# Patient Record
Sex: FEMALE | Race: WHITE | NOT HISPANIC OR LATINO | Employment: FULL TIME
[De-identification: names, ages, dates, MRNs, and addresses within clinical notes are randomized per-mention and may not be internally consistent; named-entity substitution may affect disease eponyms.]

---

## 2017-10-11 ENCOUNTER — RECORDS - HEALTHEAST (OUTPATIENT)
Dept: ADMINISTRATIVE | Facility: OTHER | Age: 47
End: 2017-10-11

## 2018-01-11 ENCOUNTER — RECORDS - HEALTHEAST (OUTPATIENT)
Dept: ADMINISTRATIVE | Facility: OTHER | Age: 48
End: 2018-01-11

## 2018-01-12 ENCOUNTER — RECORDS - HEALTHEAST (OUTPATIENT)
Dept: ADMINISTRATIVE | Facility: OTHER | Age: 48
End: 2018-01-12

## 2018-01-31 ENCOUNTER — RECORDS - HEALTHEAST (OUTPATIENT)
Dept: ADMINISTRATIVE | Facility: OTHER | Age: 48
End: 2018-01-31

## 2018-02-12 ENCOUNTER — RECORDS - HEALTHEAST (OUTPATIENT)
Dept: ADMINISTRATIVE | Facility: OTHER | Age: 48
End: 2018-02-12

## 2018-05-04 ENCOUNTER — RECORDS - HEALTHEAST (OUTPATIENT)
Dept: ADMINISTRATIVE | Facility: OTHER | Age: 48
End: 2018-05-04

## 2018-05-16 ENCOUNTER — AMBULATORY - HEALTHEAST (OUTPATIENT)
Dept: LAB | Facility: CLINIC | Age: 48
End: 2018-05-16

## 2018-05-16 ENCOUNTER — OFFICE VISIT - HEALTHEAST (OUTPATIENT)
Dept: SURGERY | Facility: CLINIC | Age: 48
End: 2018-05-16

## 2018-05-16 DIAGNOSIS — I10 HYPERTENSION: ICD-10-CM

## 2018-05-16 DIAGNOSIS — M25.569 KNEE PAIN: ICD-10-CM

## 2018-05-16 DIAGNOSIS — I21.9 MYOCARDIAL INFARCTION (H): ICD-10-CM

## 2018-05-16 DIAGNOSIS — R12 HEARTBURN: ICD-10-CM

## 2018-05-16 DIAGNOSIS — E66.01 MORBID OBESITY WITH BMI OF 40.0-44.9, ADULT (H): ICD-10-CM

## 2018-05-16 DIAGNOSIS — G47.30 SLEEP APNEA: ICD-10-CM

## 2018-05-16 DIAGNOSIS — G43.909 MIGRAINE: ICD-10-CM

## 2018-05-16 DIAGNOSIS — M72.2 PLANTAR FASCIITIS: ICD-10-CM

## 2018-05-16 DIAGNOSIS — R32 URINARY INCONTINENCE: ICD-10-CM

## 2018-05-16 DIAGNOSIS — F31.9 BIPOLAR DISORDER (H): ICD-10-CM

## 2018-05-16 DIAGNOSIS — I25.10 CORONARY ARTERY DISEASE: ICD-10-CM

## 2018-05-16 LAB
ERYTHROCYTE [DISTWIDTH] IN BLOOD BY AUTOMATED COUNT: 11.1 % (ref 11–14.5)
FERRITIN SERPL-MCNC: 121 NG/ML (ref 10–130)
FOLATE SERPL-MCNC: 15.3 NG/ML
HBA1C MFR BLD: 5.7 % (ref 3.5–6)
HCT VFR BLD AUTO: 42.9 % (ref 35–47)
HGB BLD-MCNC: 14.4 G/DL (ref 12–16)
MCH RBC QN AUTO: 31.8 PG (ref 27–34)
MCHC RBC AUTO-ENTMCNC: 33.6 G/DL (ref 32–36)
MCV RBC AUTO: 95 FL (ref 80–100)
PLATELET # BLD AUTO: 264 THOU/UL (ref 140–440)
PMV BLD AUTO: 7.7 FL (ref 7–10)
PTH-INTACT SERPL-MCNC: 35 PG/ML (ref 10–86)
RBC # BLD AUTO: 4.53 MILL/UL (ref 3.8–5.4)
TSH SERPL DL<=0.005 MIU/L-ACNC: 1.52 UIU/ML (ref 0.3–5)
VIT B12 SERPL-MCNC: 498 PG/ML (ref 213–816)
WBC: 9.1 THOU/UL (ref 4–11)

## 2018-05-16 ASSESSMENT — MIFFLIN-ST. JEOR: SCORE: 2096.86

## 2018-05-17 LAB — 25(OH)D3 SERPL-MCNC: 30.6 NG/ML (ref 30–80)

## 2018-05-18 ENCOUNTER — AMBULATORY - HEALTHEAST (OUTPATIENT)
Dept: SURGERY | Facility: CLINIC | Age: 48
End: 2018-05-18

## 2018-05-18 LAB — ZINC SERPL-MCNC: 92 UG/DL (ref 60–120)

## 2018-05-19 LAB
ANNOTATION COMMENT IMP: NORMAL
VIT A SERPL-MCNC: 0.68 MG/L (ref 0.3–1.2)
VITAMIN A (RETINYL PALMITATE): 0.06 MG/L (ref 0–0.1)

## 2018-05-21 LAB — VIT B1 PYROPHOSHATE BLD-SCNC: 208 NMOL/L (ref 70–180)

## 2018-06-19 ENCOUNTER — COMMUNICATION - HEALTHEAST (OUTPATIENT)
Dept: SURGERY | Facility: CLINIC | Age: 48
End: 2018-06-19

## 2018-07-16 ENCOUNTER — OFFICE VISIT - HEALTHEAST (OUTPATIENT)
Dept: SURGERY | Facility: CLINIC | Age: 48
End: 2018-07-16

## 2018-07-16 DIAGNOSIS — G47.30 SLEEP APNEA, UNSPECIFIED TYPE: ICD-10-CM

## 2018-07-16 DIAGNOSIS — Z71.3 NUTRITIONAL COUNSELING: ICD-10-CM

## 2018-07-16 DIAGNOSIS — E66.01 OBESITY, MORBID, BMI 40.0-49.9 (H): ICD-10-CM

## 2018-07-16 ASSESSMENT — MIFFLIN-ST. JEOR: SCORE: 2133.15

## 2018-07-17 ENCOUNTER — OFFICE VISIT - HEALTHEAST (OUTPATIENT)
Dept: SURGERY | Facility: CLINIC | Age: 48
End: 2018-07-17

## 2018-07-17 DIAGNOSIS — E66.01 MORBID OBESITY WITH BMI OF 45.0-49.9, ADULT (H): ICD-10-CM

## 2018-07-31 ENCOUNTER — OFFICE VISIT - HEALTHEAST (OUTPATIENT)
Dept: SURGERY | Facility: CLINIC | Age: 48
End: 2018-07-31

## 2018-07-31 DIAGNOSIS — E66.01 MORBID OBESITY WITH BMI OF 45.0-49.9, ADULT (H): ICD-10-CM

## 2018-08-23 ENCOUNTER — OFFICE VISIT - HEALTHEAST (OUTPATIENT)
Dept: SURGERY | Facility: CLINIC | Age: 48
End: 2018-08-23

## 2018-08-23 DIAGNOSIS — E66.01 OBESITY, MORBID, BMI 40.0-49.9 (H): ICD-10-CM

## 2018-08-23 DIAGNOSIS — E66.01 MORBID OBESITY WITH BMI OF 40.0-44.9, ADULT (H): ICD-10-CM

## 2018-08-23 DIAGNOSIS — R12 HEARTBURN: ICD-10-CM

## 2018-08-23 DIAGNOSIS — Z71.3 NUTRITIONAL COUNSELING: ICD-10-CM

## 2018-08-23 ASSESSMENT — MIFFLIN-ST. JEOR: SCORE: 2100.49

## 2018-09-27 ENCOUNTER — OFFICE VISIT - HEALTHEAST (OUTPATIENT)
Dept: SURGERY | Facility: CLINIC | Age: 48
End: 2018-09-27

## 2018-09-27 DIAGNOSIS — E66.01 OBESITY, MORBID, BMI 40.0-49.9 (H): ICD-10-CM

## 2018-09-27 DIAGNOSIS — Z71.3 NUTRITIONAL COUNSELING: ICD-10-CM

## 2018-09-27 DIAGNOSIS — G47.30 SLEEP APNEA, UNSPECIFIED TYPE: ICD-10-CM

## 2018-09-27 ASSESSMENT — MIFFLIN-ST. JEOR: SCORE: 2078.72

## 2018-10-26 ENCOUNTER — OFFICE VISIT - HEALTHEAST (OUTPATIENT)
Dept: SURGERY | Facility: CLINIC | Age: 48
End: 2018-10-26

## 2018-10-26 DIAGNOSIS — E66.01 OBESITY, MORBID, BMI 40.0-49.9 (H): ICD-10-CM

## 2018-10-26 DIAGNOSIS — Z71.3 NUTRITIONAL COUNSELING: ICD-10-CM

## 2018-10-26 DIAGNOSIS — I10 HYPERTENSION, UNSPECIFIED TYPE: ICD-10-CM

## 2018-10-26 ASSESSMENT — MIFFLIN-ST. JEOR: SCORE: 2092.33

## 2018-11-30 ENCOUNTER — OFFICE VISIT - HEALTHEAST (OUTPATIENT)
Dept: SURGERY | Facility: CLINIC | Age: 48
End: 2018-11-30

## 2018-11-30 DIAGNOSIS — Z71.3 NUTRITIONAL COUNSELING: ICD-10-CM

## 2018-11-30 DIAGNOSIS — E66.01 OBESITY, MORBID, BMI 40.0-49.9 (H): ICD-10-CM

## 2018-11-30 DIAGNOSIS — G47.30 SLEEP APNEA, UNSPECIFIED TYPE: ICD-10-CM

## 2018-11-30 ASSESSMENT — MIFFLIN-ST. JEOR: SCORE: 2010.68

## 2018-12-03 ENCOUNTER — AMBULATORY - HEALTHEAST (OUTPATIENT)
Dept: SURGERY | Facility: CLINIC | Age: 48
End: 2018-12-03

## 2018-12-04 ENCOUNTER — AMBULATORY - HEALTHEAST (OUTPATIENT)
Dept: SURGERY | Facility: CLINIC | Age: 48
End: 2018-12-04

## 2018-12-06 ENCOUNTER — OFFICE VISIT - HEALTHEAST (OUTPATIENT)
Dept: SURGERY | Facility: CLINIC | Age: 48
End: 2018-12-06

## 2018-12-06 DIAGNOSIS — I10 HYPERTENSION, UNSPECIFIED TYPE: ICD-10-CM

## 2018-12-06 DIAGNOSIS — E66.01 OBESITY, MORBID, BMI 40.0-49.9 (H): ICD-10-CM

## 2018-12-06 DIAGNOSIS — I25.10 CORONARY ARTERY DISEASE INVOLVING NATIVE HEART, ANGINA PRESENCE UNSPECIFIED, UNSPECIFIED VESSEL OR LESION TYPE: ICD-10-CM

## 2018-12-06 DIAGNOSIS — G47.33 OBSTRUCTIVE SLEEP APNEA: ICD-10-CM

## 2018-12-06 ASSESSMENT — MIFFLIN-ST. JEOR: SCORE: 2006.15

## 2018-12-10 ENCOUNTER — RECORDS - HEALTHEAST (OUTPATIENT)
Dept: ADMINISTRATIVE | Facility: OTHER | Age: 48
End: 2018-12-10

## 2018-12-11 ENCOUNTER — AMBULATORY - HEALTHEAST (OUTPATIENT)
Dept: SURGERY | Facility: CLINIC | Age: 48
End: 2018-12-11

## 2018-12-20 ENCOUNTER — RECORDS - HEALTHEAST (OUTPATIENT)
Dept: ADMINISTRATIVE | Facility: OTHER | Age: 48
End: 2018-12-20

## 2019-01-07 ENCOUNTER — AMBULATORY - HEALTHEAST (OUTPATIENT)
Dept: SURGERY | Facility: CLINIC | Age: 49
End: 2019-01-07

## 2019-01-07 DIAGNOSIS — K21.9 ACID REFLUX: ICD-10-CM

## 2019-01-07 DIAGNOSIS — Z98.84 S/P BARIATRIC SURGERY: ICD-10-CM

## 2019-01-07 DIAGNOSIS — Z71.89 ENCOUNTER FOR PRE-BARIATRIC SURGERY COUNSELING AND EDUCATION: ICD-10-CM

## 2019-01-07 DIAGNOSIS — Z01.818 PRE-OP TESTING: ICD-10-CM

## 2019-01-07 ASSESSMENT — MIFFLIN-ST. JEOR: SCORE: 2010.68

## 2019-01-21 ENCOUNTER — AMBULATORY - HEALTHEAST (OUTPATIENT)
Dept: SURGERY | Facility: CLINIC | Age: 49
End: 2019-01-21

## 2019-01-28 ENCOUNTER — ANESTHESIA - HEALTHEAST (OUTPATIENT)
Dept: SURGERY | Facility: CLINIC | Age: 49
End: 2019-01-28

## 2019-01-29 ENCOUNTER — SURGERY - HEALTHEAST (OUTPATIENT)
Dept: SURGERY | Facility: CLINIC | Age: 49
End: 2019-01-29

## 2019-01-29 ASSESSMENT — MIFFLIN-ST. JEOR
SCORE: 1930.85
SCORE: 1934.02

## 2019-02-01 ENCOUNTER — COMMUNICATION - HEALTHEAST (OUTPATIENT)
Dept: SURGERY | Facility: CLINIC | Age: 49
End: 2019-02-01

## 2019-02-04 ENCOUNTER — AMBULATORY - HEALTHEAST (OUTPATIENT)
Dept: SURGERY | Facility: CLINIC | Age: 49
End: 2019-02-04

## 2019-02-04 DIAGNOSIS — Z71.3 DIETARY COUNSELING: ICD-10-CM

## 2019-02-04 DIAGNOSIS — E66.01 OBESITY, MORBID, BMI 40.0-49.9 (H): ICD-10-CM

## 2019-02-04 DIAGNOSIS — Z98.84 S/P BARIATRIC SURGERY: ICD-10-CM

## 2019-02-11 ENCOUNTER — OFFICE VISIT - HEALTHEAST (OUTPATIENT)
Dept: SURGERY | Facility: CLINIC | Age: 49
End: 2019-02-11

## 2019-02-11 DIAGNOSIS — Z48.89 POSTOPERATIVE VISIT: ICD-10-CM

## 2019-02-11 ASSESSMENT — MIFFLIN-ST. JEOR: SCORE: 1901.82

## 2019-04-30 ENCOUNTER — OFFICE VISIT - HEALTHEAST (OUTPATIENT)
Dept: SURGERY | Facility: CLINIC | Age: 49
End: 2019-04-30

## 2019-04-30 DIAGNOSIS — E66.9 OBESITY WITH BODY MASS INDEX OF 30.0-39.9: ICD-10-CM

## 2019-04-30 DIAGNOSIS — Z71.3 NUTRITIONAL COUNSELING: ICD-10-CM

## 2019-04-30 DIAGNOSIS — Z98.84 BARIATRIC SURGERY STATUS: ICD-10-CM

## 2019-04-30 ASSESSMENT — MIFFLIN-ST. JEOR: SCORE: 1779.8

## 2019-05-21 ENCOUNTER — ANESTHESIA - HEALTHEAST (OUTPATIENT)
Dept: SURGERY | Facility: CLINIC | Age: 49
End: 2019-05-21

## 2019-05-21 ENCOUNTER — SURGERY - HEALTHEAST (OUTPATIENT)
Dept: SURGERY | Facility: CLINIC | Age: 49
End: 2019-05-21

## 2019-05-21 ENCOUNTER — COMMUNICATION - HEALTHEAST (OUTPATIENT)
Dept: SURGERY | Facility: CLINIC | Age: 49
End: 2019-05-21

## 2019-05-21 ASSESSMENT — MIFFLIN-ST. JEOR: SCORE: 1798.85

## 2019-05-24 ASSESSMENT — MIFFLIN-ST. JEOR: SCORE: 1753.95

## 2019-05-28 ENCOUNTER — COMMUNICATION - HEALTHEAST (OUTPATIENT)
Dept: SURGERY | Facility: CLINIC | Age: 49
End: 2019-05-28

## 2019-05-28 ENCOUNTER — OFFICE VISIT - HEALTHEAST (OUTPATIENT)
Dept: SURGERY | Facility: CLINIC | Age: 49
End: 2019-05-28

## 2019-05-28 DIAGNOSIS — Z48.89 POSTOPERATIVE VISIT: ICD-10-CM

## 2019-05-30 ENCOUNTER — OFFICE VISIT - HEALTHEAST (OUTPATIENT)
Dept: SURGERY | Facility: CLINIC | Age: 49
End: 2019-05-30

## 2019-05-30 DIAGNOSIS — Z48.89 POSTOPERATIVE VISIT: ICD-10-CM

## 2019-05-30 ASSESSMENT — MIFFLIN-ST. JEOR: SCORE: 1715.39

## 2019-06-04 ENCOUNTER — AMBULATORY - HEALTHEAST (OUTPATIENT)
Dept: SURGERY | Facility: CLINIC | Age: 49
End: 2019-06-04

## 2019-06-21 ENCOUNTER — AMBULATORY - HEALTHEAST (OUTPATIENT)
Dept: SURGERY | Facility: CLINIC | Age: 49
End: 2019-06-21

## 2019-06-21 DIAGNOSIS — K91.2 POSTOPERATIVE INTESTINAL MALABSORPTION: ICD-10-CM

## 2019-06-21 DIAGNOSIS — K91.2 POSTOPERATIVE MALABSORPTION: ICD-10-CM

## 2019-07-17 ENCOUNTER — AMBULATORY - HEALTHEAST (OUTPATIENT)
Dept: LAB | Facility: CLINIC | Age: 49
End: 2019-07-17

## 2019-07-17 DIAGNOSIS — K91.2 POSTOPERATIVE MALABSORPTION: ICD-10-CM

## 2019-07-17 DIAGNOSIS — K91.2 POSTOPERATIVE INTESTINAL MALABSORPTION: ICD-10-CM

## 2019-07-17 LAB
ALBUMIN SERPL-MCNC: 3.9 G/DL (ref 3.5–5)
ALP SERPL-CCNC: 69 U/L (ref 45–120)
ALT SERPL W P-5'-P-CCNC: 24 U/L (ref 0–45)
ANION GAP SERPL CALCULATED.3IONS-SCNC: 14 MMOL/L (ref 5–18)
AST SERPL W P-5'-P-CCNC: 20 U/L (ref 0–40)
BILIRUB SERPL-MCNC: 0.4 MG/DL (ref 0–1)
BUN SERPL-MCNC: 12 MG/DL (ref 8–22)
CALCIUM SERPL-MCNC: 9.9 MG/DL (ref 8.5–10.5)
CHLORIDE BLD-SCNC: 104 MMOL/L (ref 98–107)
CHOLEST SERPL-MCNC: 141 MG/DL
CO2 SERPL-SCNC: 24 MMOL/L (ref 22–31)
CREAT SERPL-MCNC: 1 MG/DL (ref 0.6–1.1)
ERYTHROCYTE [DISTWIDTH] IN BLOOD BY AUTOMATED COUNT: 11.3 % (ref 11–14.5)
FASTING STATUS PATIENT QL REPORTED: NORMAL
FERRITIN SERPL-MCNC: 36 NG/ML (ref 10–130)
FOLATE SERPL-MCNC: >20 NG/ML
GFR SERPL CREATININE-BSD FRML MDRD: 59 ML/MIN/1.73M2
GLUCOSE BLD-MCNC: 134 MG/DL (ref 70–125)
HBA1C MFR BLD: 5.1 % (ref 3.5–6)
HCT VFR BLD AUTO: 39.7 % (ref 35–47)
HDLC SERPL-MCNC: 64 MG/DL
HGB BLD-MCNC: 13.4 G/DL (ref 12–16)
LDLC SERPL CALC-MCNC: 62 MG/DL
MCH RBC QN AUTO: 31.2 PG (ref 27–34)
MCHC RBC AUTO-ENTMCNC: 33.7 G/DL (ref 32–36)
MCV RBC AUTO: 92 FL (ref 80–100)
PLATELET # BLD AUTO: 249 THOU/UL (ref 140–440)
PMV BLD AUTO: 8.2 FL (ref 7–10)
POTASSIUM BLD-SCNC: 4.6 MMOL/L (ref 3.5–5)
PROT SERPL-MCNC: 6.6 G/DL (ref 6–8)
PTH-INTACT SERPL-MCNC: 41 PG/ML (ref 10–86)
RBC # BLD AUTO: 4.29 MILL/UL (ref 3.8–5.4)
SODIUM SERPL-SCNC: 142 MMOL/L (ref 136–145)
TRIGL SERPL-MCNC: 77 MG/DL
VIT B12 SERPL-MCNC: 1631 PG/ML (ref 213–816)
WBC: 5.3 THOU/UL (ref 4–11)

## 2019-07-18 LAB — 25(OH)D3 SERPL-MCNC: 48.5 NG/ML (ref 30–80)

## 2019-07-19 LAB
ANNOTATION COMMENT IMP: NORMAL
VIT A SERPL-MCNC: 0.66 MG/L (ref 0.3–1.2)
VITAMIN A (RETINYL PALMITATE): 0.03 MG/L (ref 0–0.1)
ZINC SERPL-MCNC: 95.8 UG/DL (ref 60–120)

## 2019-07-20 LAB — VIT B1 PYROPHOSHATE BLD-SCNC: 137 NMOL/L (ref 70–180)

## 2019-07-31 ENCOUNTER — OFFICE VISIT - HEALTHEAST (OUTPATIENT)
Dept: SURGERY | Facility: CLINIC | Age: 49
End: 2019-07-31

## 2019-07-31 DIAGNOSIS — K91.2 POSTOPERATIVE MALABSORPTION: ICD-10-CM

## 2019-07-31 ASSESSMENT — MIFFLIN-ST. JEOR: SCORE: 1685.91

## 2019-11-07 ENCOUNTER — OFFICE VISIT - HEALTHEAST (OUTPATIENT)
Dept: SURGERY | Facility: CLINIC | Age: 49
End: 2019-11-07

## 2019-11-07 DIAGNOSIS — E66.9 OBESITY WITH BODY MASS INDEX OF 30.0-39.9: ICD-10-CM

## 2019-11-07 DIAGNOSIS — Z71.3 NUTRITIONAL COUNSELING: ICD-10-CM

## 2019-11-07 DIAGNOSIS — Z98.84 BARIATRIC SURGERY STATUS: ICD-10-CM

## 2019-11-07 ASSESSMENT — MIFFLIN-ST. JEOR: SCORE: 1669.12

## 2020-01-22 ENCOUNTER — AMBULATORY - HEALTHEAST (OUTPATIENT)
Dept: SURGERY | Facility: CLINIC | Age: 50
End: 2020-01-22

## 2020-01-22 DIAGNOSIS — K91.2 POSTOPERATIVE INTESTINAL MALABSORPTION: ICD-10-CM

## 2020-01-22 DIAGNOSIS — Z98.84 S/P BARIATRIC SURGERY: ICD-10-CM

## 2020-02-03 ENCOUNTER — AMBULATORY - HEALTHEAST (OUTPATIENT)
Dept: LAB | Facility: CLINIC | Age: 50
End: 2020-02-03

## 2020-02-03 DIAGNOSIS — K91.2 POSTOPERATIVE INTESTINAL MALABSORPTION: ICD-10-CM

## 2020-02-03 DIAGNOSIS — Z98.84 S/P BARIATRIC SURGERY: ICD-10-CM

## 2020-02-03 LAB
ALBUMIN SERPL-MCNC: 4.1 G/DL (ref 3.5–5)
ALP SERPL-CCNC: 71 U/L (ref 45–120)
ALT SERPL W P-5'-P-CCNC: 44 U/L (ref 0–45)
ANION GAP SERPL CALCULATED.3IONS-SCNC: 10 MMOL/L (ref 5–18)
AST SERPL W P-5'-P-CCNC: 23 U/L (ref 0–40)
BILIRUB SERPL-MCNC: 0.7 MG/DL (ref 0–1)
BUN SERPL-MCNC: 20 MG/DL (ref 8–22)
CALCIUM SERPL-MCNC: 9.5 MG/DL (ref 8.5–10.5)
CHLORIDE BLD-SCNC: 102 MMOL/L (ref 98–107)
CHOLEST SERPL-MCNC: 138 MG/DL
CO2 SERPL-SCNC: 31 MMOL/L (ref 22–31)
CREAT SERPL-MCNC: 0.92 MG/DL (ref 0.6–1.1)
ERYTHROCYTE [DISTWIDTH] IN BLOOD BY AUTOMATED COUNT: 11.3 % (ref 11–14.5)
FASTING STATUS PATIENT QL REPORTED: YES
FERRITIN SERPL-MCNC: 56 NG/ML (ref 10–130)
FOLATE SERPL-MCNC: 18.3 NG/ML
GFR SERPL CREATININE-BSD FRML MDRD: >60 ML/MIN/1.73M2
GLUCOSE BLD-MCNC: 85 MG/DL (ref 70–125)
HBA1C MFR BLD: 5.2 % (ref 3.5–6)
HCT VFR BLD AUTO: 40.9 % (ref 35–47)
HDLC SERPL-MCNC: 75 MG/DL
HGB BLD-MCNC: 14.1 G/DL (ref 12–16)
LDLC SERPL CALC-MCNC: 52 MG/DL
MCH RBC QN AUTO: 32.5 PG (ref 27–34)
MCHC RBC AUTO-ENTMCNC: 34.4 G/DL (ref 32–36)
MCV RBC AUTO: 95 FL (ref 80–100)
PLATELET # BLD AUTO: 205 THOU/UL (ref 140–440)
PMV BLD AUTO: 7.8 FL (ref 7–10)
POTASSIUM BLD-SCNC: 4.1 MMOL/L (ref 3.5–5)
PROT SERPL-MCNC: 6.6 G/DL (ref 6–8)
PTH-INTACT SERPL-MCNC: 53 PG/ML (ref 10–86)
RBC # BLD AUTO: 4.32 MILL/UL (ref 3.8–5.4)
SODIUM SERPL-SCNC: 143 MMOL/L (ref 136–145)
TRIGL SERPL-MCNC: 57 MG/DL
VIT B12 SERPL-MCNC: >2000 PG/ML (ref 213–816)
WBC: 5.6 THOU/UL (ref 4–11)

## 2020-02-04 LAB — 25(OH)D3 SERPL-MCNC: 57.2 NG/ML (ref 30–80)

## 2020-02-05 LAB
ANNOTATION COMMENT IMP: NORMAL
VIT A SERPL-MCNC: 0.61 MG/L (ref 0.3–1.2)
VIT B1 PYROPHOSHATE BLD-SCNC: 169 NMOL/L (ref 70–180)
VITAMIN A (RETINYL PALMITATE): 0.03 MG/L (ref 0–0.1)
ZINC SERPL-MCNC: 82.3 UG/DL (ref 60–120)

## 2020-02-06 ENCOUNTER — OFFICE VISIT - HEALTHEAST (OUTPATIENT)
Dept: SURGERY | Facility: CLINIC | Age: 50
End: 2020-02-06

## 2020-02-06 DIAGNOSIS — K91.2 POSTOPERATIVE MALABSORPTION: ICD-10-CM

## 2020-02-06 ASSESSMENT — MIFFLIN-ST. JEOR: SCORE: 1656.41

## 2020-05-06 ENCOUNTER — SURGERY - HEALTHEAST (OUTPATIENT)
Dept: SURGERY | Facility: CLINIC | Age: 50
End: 2020-05-06

## 2020-05-06 ENCOUNTER — RECORDS - HEALTHEAST (OUTPATIENT)
Dept: ADMINISTRATIVE | Facility: OTHER | Age: 50
End: 2020-05-06

## 2020-05-06 ENCOUNTER — COMMUNICATION - HEALTHEAST (OUTPATIENT)
Dept: SURGERY | Facility: CLINIC | Age: 50
End: 2020-05-06

## 2020-05-06 ENCOUNTER — SURGERY - HEALTHEAST (OUTPATIENT)
Dept: SURGERY | Facility: AMBULATORY SURGERY CENTER | Age: 50
End: 2020-05-06

## 2020-05-06 DIAGNOSIS — K28.3 ACUTE MARGINAL ULCER: ICD-10-CM

## 2020-05-06 DIAGNOSIS — R10.13 ABDOMINAL PAIN, EPIGASTRIC: ICD-10-CM

## 2020-05-07 ENCOUNTER — ANESTHESIA - HEALTHEAST (OUTPATIENT)
Dept: SURGERY | Facility: CLINIC | Age: 50
End: 2020-05-07

## 2020-05-07 ENCOUNTER — SURGERY - HEALTHEAST (OUTPATIENT)
Dept: SURGERY | Facility: CLINIC | Age: 50
End: 2020-05-07

## 2020-05-07 ASSESSMENT — MIFFLIN-ST. JEOR
SCORE: 1692.7
SCORE: 1597.44

## 2020-05-11 ENCOUNTER — AMBULATORY - HEALTHEAST (OUTPATIENT)
Dept: SURGERY | Facility: CLINIC | Age: 50
End: 2020-05-11

## 2020-05-15 ENCOUNTER — AMBULATORY - HEALTHEAST (OUTPATIENT)
Dept: SURGERY | Facility: CLINIC | Age: 50
End: 2020-05-15

## 2020-05-18 ENCOUNTER — RECORDS - HEALTHEAST (OUTPATIENT)
Dept: ADMINISTRATIVE | Facility: OTHER | Age: 50
End: 2020-05-18

## 2020-05-18 ENCOUNTER — AMBULATORY - HEALTHEAST (OUTPATIENT)
Dept: SURGERY | Facility: AMBULATORY SURGERY CENTER | Age: 50
End: 2020-05-18

## 2020-05-18 DIAGNOSIS — Z11.59 ENCOUNTER FOR SCREENING FOR OTHER VIRAL DISEASES: ICD-10-CM

## 2020-05-18 ASSESSMENT — MIFFLIN-ST. JEOR: SCORE: 1597.44

## 2020-05-20 ENCOUNTER — COMMUNICATION - HEALTHEAST (OUTPATIENT)
Dept: SURGERY | Facility: CLINIC | Age: 50
End: 2020-05-20

## 2020-05-21 ENCOUNTER — OFFICE VISIT - HEALTHEAST (OUTPATIENT)
Dept: SURGERY | Facility: CLINIC | Age: 50
End: 2020-05-21

## 2020-05-21 DIAGNOSIS — Z48.89 POSTOPERATIVE VISIT: ICD-10-CM

## 2020-05-27 ENCOUNTER — SURGERY - HEALTHEAST (OUTPATIENT)
Dept: SURGERY | Facility: AMBULATORY SURGERY CENTER | Age: 50
End: 2020-05-27

## 2020-06-24 ENCOUNTER — COMMUNICATION - HEALTHEAST (OUTPATIENT)
Dept: SURGERY | Facility: CLINIC | Age: 50
End: 2020-06-24

## 2020-06-24 DIAGNOSIS — K28.5 GASTROJEJUNAL ULCER WITH PERFORATION (H): ICD-10-CM

## 2020-07-27 ENCOUNTER — COMMUNICATION - HEALTHEAST (OUTPATIENT)
Dept: SURGERY | Facility: CLINIC | Age: 50
End: 2020-07-27

## 2020-09-23 ENCOUNTER — TRANSFERRED RECORDS (OUTPATIENT)
Dept: MULTI SPECIALTY CLINIC | Facility: CLINIC | Age: 50
End: 2020-09-23

## 2020-09-23 LAB
HPV ABSTRACT: NORMAL
PAP-ABSTRACT: NORMAL

## 2021-01-15 ENCOUNTER — E-CONSULT (OUTPATIENT)
Dept: SLEEP MEDICINE | Facility: CLINIC | Age: 51
End: 2021-01-15
Payer: COMMERCIAL

## 2021-01-15 ENCOUNTER — OFFICE VISIT (OUTPATIENT)
Dept: FAMILY MEDICINE | Facility: CLINIC | Age: 51
End: 2021-01-15
Payer: COMMERCIAL

## 2021-01-15 VITALS
TEMPERATURE: 96.9 F | DIASTOLIC BLOOD PRESSURE: 78 MMHG | HEART RATE: 83 BPM | BODY MASS INDEX: 32.35 KG/M2 | SYSTOLIC BLOOD PRESSURE: 110 MMHG | OXYGEN SATURATION: 98 % | WEIGHT: 226 LBS | RESPIRATION RATE: 12 BRPM | HEIGHT: 70 IN

## 2021-01-15 DIAGNOSIS — F31.9 BIPOLAR AFFECTIVE DISORDER, REMISSION STATUS UNSPECIFIED (H): ICD-10-CM

## 2021-01-15 DIAGNOSIS — I10 ESSENTIAL HYPERTENSION: ICD-10-CM

## 2021-01-15 DIAGNOSIS — M54.9 UPPER BACK PAIN: Primary | ICD-10-CM

## 2021-01-15 DIAGNOSIS — I25.10 ATHEROSCLEROSIS OF NATIVE CORONARY ARTERY OF NATIVE HEART WITHOUT ANGINA PECTORIS: ICD-10-CM

## 2021-01-15 DIAGNOSIS — F42.2 MIXED OBSESSIONAL THOUGHTS AND ACTS: ICD-10-CM

## 2021-01-15 DIAGNOSIS — I25.2 HISTORY OF MI (MYOCARDIAL INFARCTION): ICD-10-CM

## 2021-01-15 DIAGNOSIS — E78.2 MIXED HYPERLIPIDEMIA: ICD-10-CM

## 2021-01-15 DIAGNOSIS — Z95.5 STENTED CORONARY ARTERY: ICD-10-CM

## 2021-01-15 DIAGNOSIS — F33.1 MODERATE EPISODE OF RECURRENT MAJOR DEPRESSIVE DISORDER (H): ICD-10-CM

## 2021-01-15 DIAGNOSIS — G47.33 OSA (OBSTRUCTIVE SLEEP APNEA): ICD-10-CM

## 2021-01-15 DIAGNOSIS — F41.1 GAD (GENERALIZED ANXIETY DISORDER): ICD-10-CM

## 2021-01-15 DIAGNOSIS — R91.8 PULMONARY NODULES: ICD-10-CM

## 2021-01-15 PROBLEM — R12 HEARTBURN: Status: ACTIVE | Noted: 2020-11-04

## 2021-01-15 PROBLEM — K66.8 PNEUMOPERITONEUM: Status: ACTIVE | Noted: 2019-05-21

## 2021-01-15 PROBLEM — G47.30 SLEEP APNEA: Status: ACTIVE | Noted: 2020-11-04

## 2021-01-15 PROBLEM — M72.2 PLANTAR FASCIITIS: Status: ACTIVE | Noted: 2020-11-04

## 2021-01-15 PROBLEM — E66.01 MORBID OBESITY (H): Status: ACTIVE | Noted: 2019-01-29

## 2021-01-15 PROBLEM — M25.569 KNEE PAIN: Status: ACTIVE | Noted: 2020-11-04

## 2021-01-15 PROBLEM — K56.609 SMALL BOWEL OBSTRUCTION (H): Status: ACTIVE | Noted: 2020-05-07

## 2021-01-15 PROBLEM — K45.8 INTERNAL HERNIA: Status: ACTIVE | Noted: 2020-11-04

## 2021-01-15 PROBLEM — R19.8 GASTROINTESTINAL PROBLEM: Status: ACTIVE | Noted: 2019-08-01

## 2021-01-15 PROBLEM — K28.9 MARGINAL ULCER: Status: ACTIVE | Noted: 2020-05-18

## 2021-01-15 PROBLEM — I21.9 MYOCARDIAL INFARCTION (H): Status: RESOLVED | Noted: 2020-11-04 | Resolved: 2021-01-15

## 2021-01-15 PROBLEM — I21.9 MYOCARDIAL INFARCTION (H): Status: ACTIVE | Noted: 2020-11-04

## 2021-01-15 PROBLEM — K28.5: Status: ACTIVE | Noted: 2020-11-04

## 2021-01-15 PROCEDURE — 99451 NTRPROF PH1/NTRNET/EHR 5/>: CPT | Performed by: FAMILY MEDICINE

## 2021-01-15 PROCEDURE — 99417 PROLNG OP E/M EACH 15 MIN: CPT | Performed by: NURSE PRACTITIONER

## 2021-01-15 PROCEDURE — 99205 OFFICE O/P NEW HI 60 MIN: CPT | Performed by: NURSE PRACTITIONER

## 2021-01-15 RX ORDER — ARIPIPRAZOLE 10 MG/1
10 TABLET ORAL
COMMUNITY
Start: 2020-10-29 | End: 2021-03-16

## 2021-01-15 RX ORDER — BUPROPION HYDROCHLORIDE 300 MG/1
300 TABLET ORAL EVERY MORNING
Qty: 90 TABLET | Refills: 1 | Status: SHIPPED | OUTPATIENT
Start: 2021-01-15

## 2021-01-15 RX ORDER — LAMOTRIGINE 200 MG/1
200 TABLET ORAL
COMMUNITY
Start: 2020-10-29

## 2021-01-15 RX ORDER — CYCLOBENZAPRINE HCL 5 MG
5-10 TABLET ORAL 3 TIMES DAILY PRN
Qty: 20 TABLET | Refills: 3 | Status: SHIPPED | OUTPATIENT
Start: 2021-01-15 | End: 2022-03-24

## 2021-01-15 RX ORDER — DULOXETIN HYDROCHLORIDE 60 MG/1
120 CAPSULE, DELAYED RELEASE ORAL
COMMUNITY
Start: 2020-10-29

## 2021-01-15 RX ORDER — TRAZODONE HYDROCHLORIDE 50 MG/1
50 TABLET, FILM COATED ORAL
COMMUNITY
Start: 2020-01-24

## 2021-01-15 RX ORDER — ASCORBIC ACID 125 MG
1 TABLET,CHEWABLE ORAL
COMMUNITY
Start: 2019-06-07

## 2021-01-15 RX ORDER — CYCLOBENZAPRINE HCL 10 MG
TABLET ORAL
COMMUNITY
Start: 2020-10-01 | End: 2021-03-22

## 2021-01-15 RX ORDER — BENZALKONIUM CHLORIDE 1.3 MG/ML
CLOTH TOPICAL
COMMUNITY
Start: 2020-09-03

## 2021-01-15 RX ORDER — ALPRAZOLAM 0.25 MG
TABLET ORAL
COMMUNITY
Start: 2020-12-21

## 2021-01-15 RX ORDER — OMEPRAZOLE 40 MG/1
CAPSULE, DELAYED RELEASE ORAL
COMMUNITY
Start: 2020-06-24 | End: 2022-05-25

## 2021-01-15 RX ORDER — SUCRALFATE 1 G/1
1 TABLET ORAL
COMMUNITY
Start: 2020-05-06 | End: 2021-03-22

## 2021-01-15 RX ORDER — MECLIZINE HYDROCHLORIDE 25 MG/1
25 TABLET ORAL
COMMUNITY
Start: 2020-04-19 | End: 2021-12-27

## 2021-01-15 RX ORDER — ACETAMINOPHEN 500 MG
1000 TABLET ORAL
COMMUNITY
Start: 2020-05-08

## 2021-01-15 RX ORDER — BUPROPION HYDROCHLORIDE 300 MG/1
TABLET ORAL
COMMUNITY
Start: 2020-10-29 | End: 2021-01-15

## 2021-01-15 RX ORDER — NITROGLYCERIN 0.4 MG/1
0.4 TABLET SUBLINGUAL
COMMUNITY
Start: 2020-09-23

## 2021-01-15 RX ORDER — EPINEPHRINE 0.3 MG/.3ML
INJECTION SUBCUTANEOUS
COMMUNITY
Start: 2020-09-23 | End: 2021-12-27

## 2021-01-15 RX ORDER — ATORVASTATIN CALCIUM 40 MG/1
40 TABLET, FILM COATED ORAL
COMMUNITY
Start: 2019-06-07 | End: 2021-12-27

## 2021-01-15 ASSESSMENT — ANXIETY QUESTIONNAIRES
GAD7 TOTAL SCORE: 18
7. FEELING AFRAID AS IF SOMETHING AWFUL MIGHT HAPPEN: NEARLY EVERY DAY
1. FEELING NERVOUS, ANXIOUS, OR ON EDGE: MORE THAN HALF THE DAYS
2. NOT BEING ABLE TO STOP OR CONTROL WORRYING: NEARLY EVERY DAY
IF YOU CHECKED OFF ANY PROBLEMS ON THIS QUESTIONNAIRE, HOW DIFFICULT HAVE THESE PROBLEMS MADE IT FOR YOU TO DO YOUR WORK, TAKE CARE OF THINGS AT HOME, OR GET ALONG WITH OTHER PEOPLE: EXTREMELY DIFFICULT
6. BECOMING EASILY ANNOYED OR IRRITABLE: NEARLY EVERY DAY
5. BEING SO RESTLESS THAT IT IS HARD TO SIT STILL: MORE THAN HALF THE DAYS
3. WORRYING TOO MUCH ABOUT DIFFERENT THINGS: NEARLY EVERY DAY

## 2021-01-15 ASSESSMENT — PATIENT HEALTH QUESTIONNAIRE - PHQ9
5. POOR APPETITE OR OVEREATING: MORE THAN HALF THE DAYS
SUM OF ALL RESPONSES TO PHQ QUESTIONS 1-9: 17

## 2021-01-15 ASSESSMENT — MIFFLIN-ST. JEOR: SCORE: 1720.38

## 2021-01-15 ASSESSMENT — PAIN SCALES - GENERAL: PAINLEVEL: MILD PAIN (3)

## 2021-01-15 NOTE — PROGRESS NOTES
1/15/2021     E-Consult has been accepted.    Interprofessional consultation requested by: Noni Cuellar    Clinical Question/Purpose: CPAP supplies / establish care     Patient assessment and information reviewed: Note from [facility] on 9/3/2020 with Dr. Miranda of pulmonology.    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  Managed with CPAP 10 cm H2O, on download AHI ~6 with mostly central events.  Plan to continue CPAP on current settings.    Recommendations: Appears reasonable to continue CPAP on current settings and ok to enter order for DME for ongoing PAP supplies.  Recommend follow-up with sleep provider in ~ September 2021.     The recommendations provided in this E-Consult are based on the clinical data available to me at this time, and are furnished without the benefit of a comprehensive in-person or virtual patient evaluation, Any new clinical issues or changes in patient status since the filing of this E-Consult will need to be taken into account when assessing these recommendations. Please contact me if you have further questions.    My total time spent reviewing clinical information and formulating assessment was 15 minutes.    Report sent automatically to requesting provider once signed.     Charge codes: [id number] (5+ minutes)                  [id number] (No Charge code)    Robin Lr MD, MD

## 2021-01-15 NOTE — PROGRESS NOTES
Assessment & Plan     1. Upper back pain  Upper back pain likely related to posture when sitting and her work environment at home.  Encouraged increased physical activity and stretches to help with improvement of posture.  Refilled patient's cyclobenzaprine which has worked well for her in the past.  Reviewed risks and benefits and side effects of the medication.  - cyclobenzaprine (FLEXERIL) 5 MG tablet; Take 1-2 tablets (5-10 mg) by mouth 3 times daily as needed for muscle spasms  Dispense: 20 tablet; Refill: 3    2. Moderate episode of recurrent major depressive disorder (H)  Refilled patient's Wellbutrin.  I suspect that her depression is likely worse secondary to being off of this for the last several weeks.  Referral placed to psychiatry for ongoing management of psychiatric medications given her long history of psychiatric disorders.  Would like patient to follow-up in 4 to 6 weeks if she is unable to see psychiatry in that time.  To assure that her mental health is improving with reinitiation of her Wellbutrin.  - buPROPion (WELLBUTRIN XL) 300 MG 24 hr tablet; Take 1 tablet (300 mg) by mouth every morning  Dispense: 90 tablet; Refill: 1    3. Bipolar affective disorder, remission status unspecified (H)  - MENTAL HEALTH REFERRAL  - Adult; Psychiatry; Psychiatry; [facility]: Psychiatry [facility] [phone]; We will contact you to schedule the appointment or please call with any questions    4. Mixed obsessional thoughts and acts  - MENTAL HEALTH REFERRAL  - Adult; Psychiatry; Psychiatry; [facility]: Psychiatry Clinic [phone]; We will contact you to schedule the appointment or please call with any questions    5. DENICE (generalized anxiety disorder)  - MENTAL HEALTH REFERRAL  - Adult; Psychiatry; Psychiatry; [facility]: Psychiatry [facility] [phone]; We will contact you to schedule the appointment or please call with any questions    6. History of MI (myocardial infarction)  Cardiology referral placed for patient based  "on her history of myocardial infarction with myopathy and CAD.  Blood pressure is stable today.  Tolerating medications well.  - CARDIOLOGY EVAL ADULT REFERRAL; Future    7. Atherosclerosis of native coronary artery of native heart without angina pectoris  - CARDIOLOGY EVAL ADULT REFERRAL; Future    8. Essential hypertension    9. Mixed hyperlipidemia  - CARDIOLOGY EVAL ADULT REFERRAL; Future    10. Stented coronary artery  - CARDIOLOGY EVAL ADULT REFERRAL; Future    11. NAVA (obstructive sleep apnea)  Referral placed to sleep medicine for management of her CPAP and NAVA.  - E-CONSULT TO SLEEP MEDICINE (ADULT OUTPATIENT PCP TO SPECIALIST)    12. Pulmonary nodules  Incidentally found pulmonary nodules in 2018.  Due for follow-up with pulmonology.  Last pulmonology appointment was July 2019 with a CT scan.  Rec follow-up in 18 to 24 months.  Order placed for patient.  - CT Chest w/o Contrast; Future  - PULMONARY MEDICINE REFERRAL      Review of external notes as documented above   Independent interpretation of a test performed by another physician/other qualified health care professional (not separately reported) - CT scan 2019      92 minutes spent on the date of the encounter doing chart review, history and exam, documentation and further activities as noted above         BMI:   Estimated body mass index is 32.43 kg/m  as calculated from the following:    Height as of this encounter: 1.778 m (5' 10\").    Weight as of this encounter: 102.5 kg (226 lb).       Depression Screening Follow Up    PHQ 1/15/2021   PHQ-9 Total Score 17   Q9: Thoughts of better off dead/self-harm past 2 weeks Not at all         Follow Up Actions Taken  Crisis resource information provided in After Visit Summary  Depression Action Plan reviewed with patient.  Mental Health Referral placed           Return in about 6 months (around 7/15/2021) for Routine preventive.    TIAGO Estrada [facility] " SAVI Carmona is a 50 year old who presents to clinic today for the following health issues     HPI     Referrals-for cadiology, psychiatry, pulmonologist       Back Pain  Onset/Duration: 2 months  Description:   Location of pain: upper back middle  Character of pain: dull ache and constant  Pain radiation: none  New numbness or weakness in legs, not attributed to pain: no   Intensity: Currently 3/10  Progression of Symptoms: same and constant  History:   Specific cause: none  Pain interferes with job: no but it gets worse at work due to sitting all the time  History of back problems: no prior back problems  Any previous MRI or X-rays: None  Sees a specialist for back pain: No  Alleviating factors:   Improved by: acetaminophen (Tylenol) and muscle relaxants    Precipitating factors:  Worsened by: Sitting  Therapies tried and outcome: acetaminophen (Tylenol), chiropractor and muscle relaxants    Accompanying Signs & Symptoms:  Risk of Fracture: None  Risk of Cauda Equina: None  Risk of Infection: None  Risk of Cancer: None  Risk of Ankylosing Spondylitis: Onset at age <35, male, AND morning back stiffness  no         Hypertension Follow-up      Do you check your blood pressure regularly outside of the clinic? No     Are you following a low salt diet? No    Are your blood pressures ever more than 140 on the top number (systolic) OR more   than 90 on the bottom number (diastolic), for example 140/90? No    Vascular Disease Follow-up      How often do you take nitroglycerin? Never    Do you take an aspirin every day? Yes    Depression Followup    How are you doing with your depression since your last visit? Worsened, wellbutrin ran out. Has been off for a few weeks.     Are you having other symptoms that might be associated with depression? Yes:  hx of ocd and bipolar.     Have you had a significant life event?  No     Are you feeling anxious or having panic attacks?   Yes:  ocd and bipolar impact. mildly  increased anxiety in general.     Do you have any concerns with your use of alcohol or other drugs? No    Social History     Tobacco Use     Smoking status: Never Smoker     Smokeless tobacco: Never Used   Substance Use Topics     Alcohol use: Yes     Comment: occasional     Drug use: Never     PHQ 1/15/2021   PHQ-9 Total Score 17   Q9: Thoughts of better off dead/self-harm past 2 weeks Not at all     DENICE-7 SCORE 1/15/2021   Total Score 18           Suicide Assessment Five-step Evaluation and Treatment (SAFE-T)      OCD/ Biploar difficulties with concentration. Having some insomnia not manic feeling. She is not currently wellbutrin (stopped this last couple weeks because she ran out) She would like to restart this.  She does not need any refills of her Abilify, Lamictal, alprazolam, duloxetine.  She would like to get back in with psychiatry and is requesting a referral for this.  She does feel that her depression is acutely worsened at this time.    PHQ 1/15/2021   PHQ-9 Total Score 17   Q9: Thoughts of better off dead/self-harm past 2 weeks Not at all     She would also like a referral to pulmonology/sleep medicine.  She mentions having lung nodules that were identified and is due for follow-up regarding this.  Last evaluation through pulmonology was on 7/18/2019 CT scan showed that the spots on her lungs were stable and was recommended to repeat a chest CT in 18 to 24 months.  She has been on CPAP and reports that this has been tolerated well.  She was diagnosed with sleep apnea in 2012.  Since that time she has had gastric bypass and has lost 90 pounds.  In terms of identifying the lung nodules she had atypical chest pain and a CT scan was completed and showed the nodules which have been stable.  She has no history of respiratory difficulties no breathing concerns she is a non-smoker.  She denies any cough, shortness of breath, or wheezing.  She reports that her CPAP works well and feels as though it is fitting  well. Reviewed notes and associated imaging that was completed through care everywhere.       Cardiology: would like a referral for history of MI, and re evalaution/ management/ baseline care .  Positive history for cardiomyopathy.  No ongoing cardiac symptoms.  Generally she is feeling well. Reviewed previous notes from cardiology in care everywhere.     She may also need a referral to go back to the bariatric clinic. She went through [facility].  She is on a variety of supplements regarding this.  She reports overall she is feeling well.  She did have complications of the gastric ulcer.  She has not had any reflux symptoms as of this time however does report intermittent symptoms of cold sweats, sensation of rapid heartbeat/palpitations and acute onset hunger symptoms.  She reports overall her diet is stable and healthy but she may miss meals every once in a while.      How many servings of fruits and vegetables do you eat daily?  2-3    On average, how many sweetened beverages do you drink each day (Examples: soda, juice, sweet tea, etc.  Do NOT count diet or artificially sweetened beverages)?   2    How many days per week do you exercise enough to make your heart beat faster? 3 or less    How many minutes a day do you exercise enough to make your heart beat faster? 9 or less  How many days per week do you miss taking your medication? Rare, Intermittent missing of meds, did run out of wellbutrin    What makes it hard for you to take your medications?  remembering to take    Past Medical History:   Diagnosis Date     Bipolar disorder (H)      Coronary artery disease      Depressive disorder      Heart attack (H) 2013     Heart disease      Hypertension      OCD (obsessive compulsive disorder)      NAVA on CPAP      Past Surgical History:   Procedure Laterality Date     BREAST SURGERY      reduction     CHOLECYSTECTOMY       GASTRIC BYPASS       HERNIA REPAIR       FRANSISCA EN Y BOWEL   01/23/2019     STENT       STOMACH SURGERY      perferated ulcer     TOE SURGERY       TUBAL LIGATION       Patient Active Problem List   Diagnosis     Bipolar disorder (H)     Coronary atherosclerosis of native coronary artery     Esophageal reflux     Essential hypertension     Female stress incontinence     Gastrointestinal problem     Gastrojejunal ulcer with perforation (H)     Heartburn     Internal hernia     Knee pain     Marginal ulcer     Migraine     Mixed hyperlipidemia     Moderate episode of recurrent major depressive disorder (H)     Morbid obesity (H)     Obsessive-compulsive disorder     Plantar fasciitis     Pneumoperitoneum     NAVA (obstructive sleep apnea)     Small bowel obstruction (H)     Stented coronary artery     DENICE (generalized anxiety disorder)     History of MI (myocardial infarction)     Upper back pain     Pulmonary nodules     Current Outpatient Medications   Medication Instructions     acetaminophen (TYLENOL) 1,000 mg, Oral     ALPRAZolam (XANAX) 0.25 MG tablet Oral     ARIPiprazole (ABILIFY) 10 mg, Oral     aspirin (ASA) 81 mg, Oral     atorvastatin (LIPITOR) 40 mg, Oral     buPROPion (WELLBUTRIN XL) 300 mg, Oral, EVERY MORNING     cholecalciferol 2,000 Units, Oral     cyclobenzaprine (FLEXERIL) 10 MG tablet Take 1/2 to 1 tab every 8 hrs as needed for spasm     cyclobenzaprine (FLEXERIL) 5-10 mg, Oral, 3 TIMES DAILY PRN     DULoxetine (CYMBALTA) 120 mg, Oral     EPINEPHrine (ANY BX GENERIC EQUIV) 0.3 MG/0.3ML injection 2-pack Inject 0.3 mg intramuscular one time if needed for tongue swelling, SOB. Go to ER immediately after use for possible rebound     lamoTRIgine (LAMICTAL) 200 mg, Oral     levonorgestrel (MIRENA) 20 MCG/24HR IUD 1 each, Intrauterine     meclizine (ANTIVERT) 25 mg, Oral     nitroGLYcerin (NITROSTAT) 0.4 mg, Sublingual     Omega-3 Fatty Acids (FISH OIL PEARLS) 183.33 MG CAPS 1 capsule, Oral     omeprazole (PRILOSEC) 40 MG DR capsule No dose, route, or frequency  "recorded.     Respiratory Therapy Supplies ([facility] CPAP FILTER) MISC CPAP for home use at pressure of 10cmw.  Heated humidifier, Heated humidifier x1 q5 yr , Humidifier chamber x1 q6 mo, Full face mask x1 q3 mo, Full face cushion q2 mo, Heated tubing x1 q3 mo, Headgear x1 q6 mo, Chinstrap x1 q6 mo, Filters: Disposable x2 q mo, Non-disposable filters x1 q6 mo, Length of Need: 99 months, Frequency of use: Daily.     Sharps Container (SHARPS-A-GATOR LOCKING BRACKET) MISC CPAP for home use at pressure of 9  Heated humidifier x1, Humidifier chamber x1, Heated tubing x1, Full face mask with cushion x1, Headgear x1, Filters: Disposable x1 pack, Reusable x1pk, Length of Need: 99 months, Frequency of use: Daily     sucralfate (CARAFATE) 1 g, Oral     traZODone (DESYREL) 50 mg, Oral     vitamin B complex with vitamin C (VITAMIN  B COMPLEX) tablet 1 tablet, Oral         Review of Systems   Constitutional, HEENT, cardiovascular, pulmonary, GI, , musculoskeletal, neuro, skin, endocrine and psych systems are negative, except as otherwise noted.      Objective    /78 (BP Location: Right arm, Patient Position: Chair, Cuff Size: Adult Large)   Pulse 83   Temp 96.9  F (36.1  C) (Tympanic)   Resp 12   Ht 1.778 m (5' 10\")   Wt 102.5 kg (226 lb)   LMP  (LMP Unknown)   SpO2 98%   Breastfeeding No   BMI 32.43 kg/m    Body mass index is 32.43 kg/m .  Physical Exam     GENERAL: healthy, alert and no distress  NECK: no adenopathy, no asymmetry, masses, or scars and thyroid normal to palpation  RESP: lungs clear to auscultation - no rales, rhonchi or wheezes  CV: regular rate and rhythm, normal S1 S2, no S3 or S4, no murmur, click or rub, no peripheral edema and peripheral pulses strong  ABDOMEN: soft, nontender, no hepatosplenomegaly, no masses and bowel sounds normal  MS: no gross musculoskeletal defects noted, no edema  PSYCH: mentation appears normal, affect flat, judgement and insight intact and appearance " well groomed    Test results and imaging results reviewed through Care Everywhere comprehensively from July 2017-October 2020.

## 2021-01-16 ASSESSMENT — ANXIETY QUESTIONNAIRES: GAD7 TOTAL SCORE: 18

## 2021-01-26 DIAGNOSIS — R91.8 PULMONARY NODULES: Primary | ICD-10-CM

## 2021-01-27 NOTE — TELEPHONE ENCOUNTER
Oncology/Surgical Oncology Referral Request:     Specialty Requested: Medical Oncology     Referring Provider: Noni Cuellar APRN CNP     Referring Clinic/Organization: [facility]    Records location: [facility]     Requested Provider (if specified): Not Specified

## 2021-01-28 NOTE — TELEPHONE ENCOUNTER
RECORDS STATUS - ALL OTHER DIAGNOSIS      RECORDS RECEIVED FROM: Derick Lugo   DATE RECEIVED:  1/28/21   NOTES STATUS DETAILS   OFFICE NOTE from referring provider Epic/LIZZ - Noni Banuelos APRN CNP in  FAMILY PRACTICE: 1/15/21        Pt Last saw Dr. Jatinder Miranda: 9/3/20   OFFICE NOTE from medical oncologist     DISCHARGE SUMMARY from hospital     DISCHARGE REPORT from the ER     OPERATIVE REPORT     MEDICATION LIST     CLINICAL TRIAL TREATMENTS TO DATE     LABS     PATHOLOGY REPORTS     ANYTHING RELATED TO DIAGNOSIS     GENONOMIC TESTING     TYPE:     IMAGING (NEED IMAGES & REPORT)     CT SCANS PACS 2/1/20: Scheduled @ [facility]    7/18/19: Derick   MRI     MAMMO     ULTRASOUND     PET

## 2021-02-01 ENCOUNTER — HOSPITAL ENCOUNTER (OUTPATIENT)
Dept: CT IMAGING | Facility: CLINIC | Age: 51
Discharge: HOME | End: 2021-02-01
Attending: NURSE PRACTITIONER | Admitting: NURSE PRACTITIONER
Payer: COMMERCIAL

## 2021-02-01 DIAGNOSIS — R91.8 PULMONARY NODULES: ICD-10-CM

## 2021-02-01 PROCEDURE — 250N000009 HC RX 250: Performed by: NURSE PRACTITIONER

## 2021-02-01 PROCEDURE — 71260 CT THORAX DX C+: CPT

## 2021-02-01 PROCEDURE — 250N000011 HC RX IP 250 OP 636: Performed by: NURSE PRACTITIONER

## 2021-02-01 RX ORDER — IOPAMIDOL 755 MG/ML
80 INJECTION, SOLUTION INTRAVASCULAR ONCE
Status: COMPLETED | OUTPATIENT
Start: 2021-02-01 | End: 2021-02-01

## 2021-02-01 RX ADMIN — IOPAMIDOL 80 ML: 755 INJECTION, SOLUTION INTRAVENOUS at 18:18

## 2021-02-01 RX ADMIN — SODIUM CHLORIDE 80 ML: 9 INJECTION, SOLUTION INTRAVENOUS at 18:18

## 2021-02-02 ENCOUNTER — PRE VISIT (OUTPATIENT)
Dept: PULMONOLOGY | Facility: CLINIC | Age: 51
End: 2021-02-02

## 2021-02-02 ENCOUNTER — VIRTUAL VISIT (OUTPATIENT)
Dept: PULMONOLOGY | Facility: CLINIC | Age: 51
End: 2021-02-02
Attending: NURSE PRACTITIONER
Payer: COMMERCIAL

## 2021-02-02 DIAGNOSIS — R91.1 LUNG NODULE: Primary | ICD-10-CM

## 2021-02-02 PROCEDURE — 99214 OFFICE O/P EST MOD 30 MIN: CPT | Mod: TEL | Performed by: INTERNAL MEDICINE

## 2021-02-02 PROCEDURE — 999N001193 HC VIDEO/TELEPHONE VISIT; NO CHARGE

## 2021-02-02 NOTE — PROGRESS NOTES
"Kristine is a 50 year old who is being evaluated via a billable telephone visit.      What phone number would you like to be contacted at? [phone]  How would you like to obtain your AVS? Mail a copy   Vitals - Patient Reported  Weight (Patient Reported): 101.2 kg (223 lb)  Height (Patient Reported): 177.8 cm (5' 10\")  BMI (Based on Pt Reported Ht/Wt): 32  Pain Score: No Pain (0)    50F with history of lung nodule and cystic area here for evaluation.  CT chest without changes since 2018.  She is having no symptoms.    CT reviewed and interpreted by me. Chart reviewed.  Results discussed with patient.    No follow up needed.    Magnus Nielsen MD  13 minutes spent on this telephone visit.    "

## 2021-02-02 NOTE — LETTER
"2/2/2021       RE: Kristine Scott  [address]  [facility] [facility] [address]     Dear Colleague,    Thank you for referring your patient, Kristine Scott, to the [facility] at [facility]. Please see a copy of my visit note below.    Kristine is a 50 year old who is being evaluated via a billable telephone visit.      What phone number would you like to be contacted at? [phone]  How would you like to obtain your AVS? Mail a copy   Vitals - Patient Reported  Weight (Patient Reported): 101.2 kg (223 lb)  Height (Patient Reported): 177.8 cm (5' 10\")  BMI (Based on Pt Reported Ht/Wt): 32  Pain Score: No Pain (0)    50F with history of lung nodule and cystic area here for evaluation.  CT chest without changes since 2018.  She is having no symptoms.    CT reviewed and interpreted by me. Chart reviewed.  Results discussed with patient.    No follow up needed.    Magnus Nielsen MD  13 minutes spent on this telephone visit.            "

## 2021-02-03 ENCOUNTER — OFFICE VISIT (OUTPATIENT)
Dept: FAMILY MEDICINE | Facility: CLINIC | Age: 51
End: 2021-02-03
Payer: COMMERCIAL

## 2021-02-03 VITALS
OXYGEN SATURATION: 99 % | TEMPERATURE: 97.4 F | BODY MASS INDEX: 34.18 KG/M2 | SYSTOLIC BLOOD PRESSURE: 102 MMHG | WEIGHT: 238.2 LBS | RESPIRATION RATE: 18 BRPM | DIASTOLIC BLOOD PRESSURE: 62 MMHG | HEART RATE: 81 BPM

## 2021-02-03 DIAGNOSIS — R35.0 URINARY FREQUENCY: ICD-10-CM

## 2021-02-03 DIAGNOSIS — B37.31 YEAST VAGINITIS: ICD-10-CM

## 2021-02-03 DIAGNOSIS — N39.0 URINARY TRACT INFECTION WITHOUT HEMATURIA, SITE UNSPECIFIED: Primary | ICD-10-CM

## 2021-02-03 DIAGNOSIS — R82.90 NONSPECIFIC FINDING ON EXAMINATION OF URINE: ICD-10-CM

## 2021-02-03 LAB
ALBUMIN UR-MCNC: ABNORMAL MG/DL
APPEARANCE UR: ABNORMAL
BACTERIA #/AREA URNS HPF: ABNORMAL /HPF
BILIRUB UR QL STRIP: NEGATIVE
COLOR UR AUTO: YELLOW
GLUCOSE UR STRIP-MCNC: NEGATIVE MG/DL
HGB UR QL STRIP: ABNORMAL
KETONES UR STRIP-MCNC: NEGATIVE MG/DL
LEUKOCYTE ESTERASE UR QL STRIP: ABNORMAL
NITRATE UR QL: NEGATIVE
PH UR STRIP: 7.5 PH (ref 5–7)
RBC #/AREA URNS AUTO: ABNORMAL /HPF
SOURCE: ABNORMAL
SP GR UR STRIP: 1.02 (ref 1–1.03)
UROBILINOGEN UR STRIP-ACNC: 1 EU/DL (ref 0.2–1)
WBC #/AREA URNS AUTO: ABNORMAL /HPF

## 2021-02-03 PROCEDURE — 81001 URINALYSIS AUTO W/SCOPE: CPT | Performed by: NURSE PRACTITIONER

## 2021-02-03 PROCEDURE — 87086 URINE CULTURE/COLONY COUNT: CPT | Performed by: NURSE PRACTITIONER

## 2021-02-03 PROCEDURE — 99213 OFFICE O/P EST LOW 20 MIN: CPT | Performed by: NURSE PRACTITIONER

## 2021-02-03 RX ORDER — SULFAMETHOXAZOLE/TRIMETHOPRIM 800-160 MG
1 TABLET ORAL 2 TIMES DAILY
Qty: 14 TABLET | Refills: 0 | Status: SHIPPED | OUTPATIENT
Start: 2021-02-03 | End: 2021-03-22

## 2021-02-03 RX ORDER — FLUCONAZOLE 150 MG/1
150 TABLET ORAL ONCE
Qty: 1 TABLET | Refills: 0 | Status: SHIPPED | OUTPATIENT
Start: 2021-02-03 | End: 2021-02-03

## 2021-02-03 NOTE — PATIENT INSTRUCTIONS
Take the antibiotics as prescribed and when done can take the Diflucan after the antibiotics.  Push fluids.  Follow up if any worsening.    Our Clinic hours are:  Mondays    7:20 am - 7 pm  Tues -  Fri  7:20 am - 5 pm    Clinic Phone: [phone]    The clinic lab opens at 7:30 am Mon - Fri and appointments are required.    [facility]. [phone]  Monday  8 am - 7pm  Tues - Fri 8 am - 5:30 pm

## 2021-02-03 NOTE — PROGRESS NOTES
"  Assessment & Plan     Urinary tract infection without hematuria, site unspecified    - sulfamethoxazole-trimethoprim (BACTRIM DS) 800-160 MG tablet; Take 1 tablet by mouth 2 times daily  Discussed how to take the medication(s), expected outcomes, potential side effects.    Urinary frequency    - UA with Microscopic reflex to Culture  - Urine Culture Aerobic Bacterial    Nonspecific finding on examination of urine    - Urine Culture Aerobic Bacterial    Yeast vaginitis    - fluconazole (DIFLUCAN) 150 MG tablet; Take 1 tablet (150 mg) by mouth once for 1 dose  Discussed how to take the medication(s), expected outcomes, potential side effects.           BMI:   Estimated body mass index is 34.18 kg/m  as calculated from the following:    Height as of 1/15/21: 1.778 m (5' 10\").    Weight as of this encounter: 108 kg (238 lb 3.2 oz).         See Patient Instructions  Patient Instructions   Take the antibiotics as prescribed and when done can take the Diflucan after the antibiotics.  Push fluids.  Follow up if any worsening.    Our Clinic hours are:  Mondays    7:20 am - 7 pm  Tues -  Fri  7:20 am - 5 pm    Clinic Phone: [phone]    The clinic lab opens at 7:30 am Mon - Fri and appointments are required.    [facility] [place]  Ph. [phone]  Monday  8 am - 7pm  Tues - Fri 8 am - 5:30 pm           Return in about 2 days (around 2/5/2021) for or sooner if symptoms persist or worsen.    TIAGO Renae CNP  [facility]    Eddie Carmona is a 50 year old who presents to clinic today for the following health issues     HPI       Genitourinary - Female  Onset/Duration: 24 hours  Description:   Painful urination (Dysuria): YES, discomfort           Frequency: YES  Blood in urine (Hematuria): YES  Delay in urine (Hesitency): no  Intensity: moderate  Progression of Symptoms:  same  Accompanying Signs & Symptoms:  Fever/chills: no  Flank pain: no  Nausea and vomiting: " no  Vaginal symptoms: none  Abdominal/Pelvic Pain: no  History:   History of frequent UTI s: no  History of kidney stones: no  Sexually Active: YES  Possibility of pregnancy: No  Precipitating or alleviating factors: None  Therapies tried and outcome: OTC advil or tylenol       States she's had one previous, similar UTI in the past.  Denies any other concerns today. Is going to transfer care to this clinic, previous care obtained at [place].  Reports she needs a medication for yeast as she typically gets that after antibiotic use.        Past Medical History:   Diagnosis Date     Bipolar disorder (H)      Coronary artery disease      Depressive disorder      Heart attack (H) 2013     Heart disease      Hypertension      OCD (obsessive compulsive disorder)      NAVA on CPAP      Current Outpatient Medications   Medication     acetaminophen (TYLENOL) 500 MG tablet     ALPRAZolam (XANAX) 0.25 MG tablet     ARIPiprazole (ABILIFY) 10 MG tablet     aspirin (ASA) 81 MG EC tablet     atorvastatin (LIPITOR) 40 MG tablet     buPROPion (WELLBUTRIN XL) 300 MG 24 hr tablet     cholecalciferol 50 MCG (2000 UT) tablet     cyclobenzaprine (FLEXERIL) 5 MG tablet     DULoxetine (CYMBALTA) 60 MG capsule     fluconazole (DIFLUCAN) 150 MG tablet     lamoTRIgine (LAMICTAL) 200 MG tablet     levonorgestrel (MIRENA) 20 MCG/24HR IUD     Omega-3 Fatty Acids (FISH OIL PEARLS) 183.33 MG CAPS     omeprazole (PRILOSEC) 40 MG DR capsule     sucralfate (CARAFATE) 1 GM tablet     sulfamethoxazole-trimethoprim (BACTRIM DS) 800-160 MG tablet     traZODone (DESYREL) 50 MG tablet     vitamin B complex with vitamin C (VITAMIN  B COMPLEX) tablet     cyclobenzaprine (FLEXERIL) 10 MG tablet     EPINEPHrine (ANY BX GENERIC EQUIV) 0.3 MG/0.3ML injection 2-pack     meclizine (ANTIVERT) 25 MG tablet     nitroGLYcerin (NITROSTAT) 0.4 MG sublingual tablet     Respiratory Therapy Supplies (CARETOUCH UNIVERSL CPAP FILTER) MISC     Sharps Container  (SHARPS-A-GATOR LOCKING BRACKET) [facility]     No current facility-administered medications for this visit.          Review of Systems   Constitutional, HEENT, cardiovascular, pulmonary, gi and gu systems are negative, except as otherwise noted.      Objective    /62 (Cuff Size: Adult Large)   Pulse 81   Temp 97.4  F (36.3  C) (Tympanic)   Resp 18   Wt 108 kg (238 lb 3.2 oz)   LMP  (LMP Unknown)   SpO2 99%   BMI 34.18 kg/m    Body mass index is 34.18 kg/m .  Physical Exam   GENERAL: healthy, alert and no distress  NECK: no adenopathy, no asymmetry  RESP: lungs clear to auscultation - no rales, rhonchi or wheezes  CV: regular rate and rhythm, normal S1 S2, no S3 or S4, no murmur  ABDOMEN: soft, nontender, no hepatosplenomegaly, no masses and bowel sounds normal, no CVA tenderness  MS: no gross musculoskeletal defects noted      Results for orders placed or performed in visit on 02/03/21   UA with Microscopic reflex to Culture     Status: Abnormal    Specimen: Midstream Urine   Result Value Ref Range    Color Urine Yellow     Appearance Urine Slightly Cloudy     Glucose Urine Negative NEG^Negative mg/dL    Bilirubin Urine Negative NEG^Negative    Ketones Urine Negative NEG^Negative mg/dL    Specific Gravity Urine 1.020 1.003 - 1.035    pH Urine 7.5 (H) 5.0 - 7.0 pH    Protein Albumin Urine Trace (A) NEG^Negative mg/dL    Urobilinogen Urine 1.0 0.2 - 1.0 EU/dL    Nitrite Urine Negative NEG^Negative    Blood Urine Moderate (A) NEG^Negative    Leukocyte Esterase Urine Large (A) NEG^Negative    Source Midstream Urine     WBC Urine  (A) OTO5^0 - 5 /HPF    RBC Urine 2-5 (A) OTO2^O - 2 /HPF    Bacteria Urine Moderate (A) NEG^Negative /HPF

## 2021-02-04 LAB
BACTERIA SPEC CULT: NO GROWTH
Lab: NORMAL
SPECIMEN SOURCE: NORMAL

## 2021-03-16 ENCOUNTER — PATIENT OUTREACH (OUTPATIENT)
Dept: NURSING | Facility: CLINIC | Age: 51
End: 2021-03-16
Payer: COMMERCIAL

## 2021-03-16 ENCOUNTER — OFFICE VISIT (OUTPATIENT)
Dept: FAMILY MEDICINE | Facility: CLINIC | Age: 51
End: 2021-03-16
Payer: COMMERCIAL

## 2021-03-16 VITALS
WEIGHT: 238 LBS | SYSTOLIC BLOOD PRESSURE: 128 MMHG | HEART RATE: 110 BPM | BODY MASS INDEX: 34.07 KG/M2 | RESPIRATION RATE: 18 BRPM | HEIGHT: 70 IN | TEMPERATURE: 96.7 F | OXYGEN SATURATION: 99 % | DIASTOLIC BLOOD PRESSURE: 84 MMHG

## 2021-03-16 DIAGNOSIS — F42.2 MIXED OBSESSIONAL THOUGHTS AND ACTS: ICD-10-CM

## 2021-03-16 DIAGNOSIS — F31.9 BIPOLAR AFFECTIVE DISORDER, REMISSION STATUS UNSPECIFIED (H): Primary | ICD-10-CM

## 2021-03-16 DIAGNOSIS — F41.1 GAD (GENERALIZED ANXIETY DISORDER): ICD-10-CM

## 2021-03-16 DIAGNOSIS — F10.10 ETOH ABUSE: ICD-10-CM

## 2021-03-16 DIAGNOSIS — F33.1 MODERATE EPISODE OF RECURRENT MAJOR DEPRESSIVE DISORDER (H): ICD-10-CM

## 2021-03-16 PROCEDURE — 96127 BRIEF EMOTIONAL/BEHAV ASSMT: CPT | Performed by: NURSE PRACTITIONER

## 2021-03-16 PROCEDURE — 99215 OFFICE O/P EST HI 40 MIN: CPT | Performed by: NURSE PRACTITIONER

## 2021-03-16 RX ORDER — ARIPIPRAZOLE 10 MG/1
10 TABLET ORAL DAILY
Qty: 90 TABLET | Refills: 0 | Status: SHIPPED | OUTPATIENT
Start: 2021-03-16

## 2021-03-16 ASSESSMENT — PATIENT HEALTH QUESTIONNAIRE - PHQ9
5. POOR APPETITE OR OVEREATING: NEARLY EVERY DAY
SUM OF ALL RESPONSES TO PHQ QUESTIONS 1-9: 24

## 2021-03-16 ASSESSMENT — ANXIETY QUESTIONNAIRES
5. BEING SO RESTLESS THAT IT IS HARD TO SIT STILL: MORE THAN HALF THE DAYS
2. NOT BEING ABLE TO STOP OR CONTROL WORRYING: NEARLY EVERY DAY
5. BEING SO RESTLESS THAT IT IS HARD TO SIT STILL: MORE THAN HALF THE DAYS
3. WORRYING TOO MUCH ABOUT DIFFERENT THINGS: NEARLY EVERY DAY
7. FEELING AFRAID AS IF SOMETHING AWFUL MIGHT HAPPEN: SEVERAL DAYS
4. TROUBLE RELAXING: NEARLY EVERY DAY
GAD7 TOTAL SCORE: 16
1. FEELING NERVOUS, ANXIOUS, OR ON EDGE: MORE THAN HALF THE DAYS
3. WORRYING TOO MUCH ABOUT DIFFERENT THINGS: NEARLY EVERY DAY
IF YOU CHECKED OFF ANY PROBLEMS ON THIS QUESTIONNAIRE, HOW DIFFICULT HAVE THESE PROBLEMS MADE IT FOR YOU TO DO YOUR WORK, TAKE CARE OF THINGS AT HOME, OR GET ALONG WITH OTHER PEOPLE: EXTREMELY DIFFICULT
7. FEELING AFRAID AS IF SOMETHING AWFUL MIGHT HAPPEN: SEVERAL DAYS
1. FEELING NERVOUS, ANXIOUS, OR ON EDGE: MORE THAN HALF THE DAYS
GAD7 TOTAL SCORE: 16
6. BECOMING EASILY ANNOYED OR IRRITABLE: MORE THAN HALF THE DAYS
2. NOT BEING ABLE TO STOP OR CONTROL WORRYING: NEARLY EVERY DAY
6. BECOMING EASILY ANNOYED OR IRRITABLE: MORE THAN HALF THE DAYS
IF YOU CHECKED OFF ANY PROBLEMS ON THIS QUESTIONNAIRE, HOW DIFFICULT HAVE THESE PROBLEMS MADE IT FOR YOU TO DO YOUR WORK, TAKE CARE OF THINGS AT HOME, OR GET ALONG WITH OTHER PEOPLE: EXTREMELY DIFFICULT

## 2021-03-16 ASSESSMENT — MIFFLIN-ST. JEOR: SCORE: 1774.81

## 2021-03-16 NOTE — PROGRESS NOTES
Assessment & Plan       ICD-10-CM    1. Bipolar affective disorder, remission status unspecified (H)  F31.9 ARIPiprazole (ABILIFY) 10 MG tablet     CARE COORDINATION [facility]  - Adult; Outpatient Treatment, Psychiatry; Individual/Couples/Family/Group Therapy/Health Psychology; [facility] [phone]; We will contact you to schedule the appointment or please call with...   2. DENICE (generalized anxiety disorder)  F41.1 [facility]  - Adult; Outpatient Treatment, Psychiatry; Individual/Couples/Family/Group Therapy/Health Psychology; [facility] [phone]; We will contact you to schedule the appointment or please call with...   3. Moderate episode of recurrent major depressive disorder (H)  F33.1 [facility]  - Adult; Outpatient Treatment, Psychiatry; Individual/Couples/Family/Group Therapy/Health Psychology; [facility] [phone]; We will contact you to schedule the appointment or please call with...   4. Mixed obsessional thoughts and acts  F42.2 [facility]  - Adult; Outpatient Treatment, Psychiatry; Individual/Couples/Family/Group Therapy/Health Psychology; [facility] [phone]; We will contact you to schedule the appointment or please call with...   5. ETOH abuse  F10.10 [facility]  - Adult; Outpatient Treatment, Psychiatry; Individual/Couples/Family/Group Therapy/Health Psychology; [facility] [phone]; We will contact you to schedule the appointment or please call with...     Care management referral placed for patient.  Encouraged her to follow-up with her psychiatrist.  Also discussed with patient that if symptoms of her depression and anxiety worsen and she has  "worsening suicidal ideation to present to the emergency department for urgent care and likely admission for mental health crisis.  Discussed safety plan with patient.  She does not have a plan in place passive thoughts.  No medication adjustments completed today.  Refilled patient's Abilify.    If unable to continue to see her established psychiatrist new referral placed for mental health placed to establish care with a psychiatrist and counselor.  She may need more intensive therapy.  Also highly encouraged chemical dependency management.     Note written for patient to be out of work for 2 weeks.  Follow up in 1 week.     40 minutes spent on the date of the encounter doing chart review, history and exam, documentation and further activities as noted above       BMI:   Estimated body mass index is 34.15 kg/m  as calculated from the following:    Height as of this encounter: 1.778 m (5' 10\").    Weight as of this encounter: 108 kg (238 lb).       Depression Screening Follow Up    PHQ 3/16/2021   PHQ-9 Total Score 24   Q9: Thoughts of better off dead/self-harm past 2 weeks Several days     Last PHQ-9 3/16/2021   1.  Little interest or pleasure in doing things 3   2.  Feeling down, depressed, or hopeless 3   3.  Trouble falling or staying asleep, or sleeping too much 3   4.  Feeling tired or having little energy 3   5.  Poor appetite or overeating 3   6.  Feeling bad about yourself 3   7.  Trouble concentrating 3   8.  Moving slowly or restless 2   Q9: Thoughts of better off dead/self-harm past 2 weeks 1   PHQ-9 Total Score 24   Difficulty at work, home, or with people Extremely dIfficult               Follow Up      Follow Up Actions Taken  Crisis resource information provided in the After Visit Summary  Referred patient back to mental health provider  Mental Health Referral placed    Discussed the following ways the patient can remain in a safe environment:  remove alcohol and remove drugs      Return in 1 week (on " 3/23/2021) for Follow-up.    TIAGO Estrada CNP  [facility]    Eddie Carmona is a 50 year old who presents for the following health issues     HPI     Depression and Anxiety Follow-Up    How are you doing with your depression since your last visit? Worsened     How are you doing with your anxiety since your last visit?  Worsened     Are you having other symptoms that might be associated with depression or anxiety? Yes:  panic attacks, racing thoughts, insomnia, hard time turning thoughts off at night.     Have you had a significant life event? No     Do you have any concerns with your use of alcohol or other drugs? Yes:  alcohol a bottle of wine a day. She started drinking more over the summer. This increased around altaf.      Social History     Tobacco Use     Smoking status: Never Smoker     Smokeless tobacco: Never Used   Substance Use Topics     Alcohol use: Yes     Comment: a bottle of wine a day      Drug use: Never     PHQ 1/15/2021 3/16/2021 3/16/2021   PHQ-9 Total Score 17 24 24   Q9: Thoughts of better off dead/self-harm past 2 weeks Not at all Several days Several days     DENICE-7 SCORE 1/15/2021 3/16/2021 3/16/2021   Total Score 18 16 16         Suicide Assessment Five-step Evaluation and Treatment (SAFE-T)      How many servings of fruits and vegetables do you eat daily?  0-1    On average, how many sweetened beverages do you drink each day (Examples: soda, juice, sweet tea, etc.  Do NOT count diet or artificially sweetened beverages)?   3    How many days per week do you exercise enough to make your heart beat faster? 3 or less    How many minutes a day do you exercise enough to make your heart beat faster? 9 or less  How many days per week do you miss taking your medication? 1 every two weeks     What makes it hard for you to take your medications?  remembering to take    She has had worsening symptoms of anxiety and depression. Rarely missing doses. She has  "not seen her psychiatrist since November 2020. She has reached out to employee assistance.  No active plan of suicide. She has passive thoughts of a car accident. The wellbutrin is new for her in October. No new medications. She is using xanax a couple times per week 1-2 tab at that time.     Sleeping has been \"a lot or little\" She has a hard time falling back asleep. She does take trazodone but didn't take this regularly and didn't find that this was helpful so hasn't taken this for a couple weeks.     Symptoms started to worsen about 6 weeks ago.         Review of Systems   Constitutional, HEENT, cardiovascular, pulmonary, gi and gu systems are negative, except as otherwise noted.      Objective    /84   Pulse 110   Temp 96.7  F (35.9  C) (Tympanic)   Resp 18   Ht 1.778 m (5' 10\")   Wt 108 kg (238 lb)   SpO2 99%   BMI 34.15 kg/m    Body mass index is 34.15 kg/m .  Physical Exam   GENERAL: healthy, alert and no distress  NECK: no adenopathy, no asymmetry, masses, or scars and thyroid normal to palpation  RESP: lungs clear to auscultation - no rales, rhonchi or wheezes  CV: regular rate and rhythm, normal S1 S2, no S3 or S4, no murmur, click or rub, no peripheral edema and peripheral pulses strong  ABDOMEN: soft, nontender, no hepatosplenomegaly, no masses and bowel sounds normal  MS: no gross musculoskeletal defects noted, no edema  PSYCH: concentration poor, affect flat, tearful, anxious, judgement and insight impaired and appearance well groomed          "

## 2021-03-16 NOTE — PATIENT INSTRUCTIONS
"                                           Follow up with psychiatry team.       Kristine Scott     SAFETY PLAN:  Step 1: Warning signs / cues (Thoughts, images, mood, situation, behavior) that a crisis may be developing:    Thoughts: \"I don't matter\", \"People would be better off without me\", \"I'm a burden\", \"I can't do this anymore\", \"I just want this to end\" and \"Nothing makes it better\"    Images: obsessive thoughts of death or dying: like car accident, flashbacks and visions of harm: .    Thinking Processes: racing thoughts, intrusive thoughts (bothersome, unwanted thoughts that come out of nowhere): ., highly critical and negative thoughts: ., disorganized thinking: ., paranoia: . and depersonalization    Mood: worsening depression, hopelessness, helplessness, intense anger, intense worry, agitation, disinhibited (not caring about things or consequences) and mood swings    Behaviors: isolating/withdrawing , using drugs, using alcohol, can't stop crying, giving things away, saying good-bye, aggression, not taking care of myself, not taking care of my responsibilities, sleeping too much, not sleeping enough and increasing frequency and duration of dissociation    Situations: pain, relationship problems, trauma , relapse and financial stress   Step 2: Coping strategies - Things I can do to take my mind off of my problems without contacting another person (relaxation technique, physical activity):    Distress Tolerance Strategies:  relaxation activities: deep breathing, meditation., arts and crafts: ., play with my pet , listen to positive and upbeat music: ., sensory based activities/self-soothe with five senses: ., watch a funny movie: ., pray, read a book: ., change body temperature (ice pack/cold water) , paced breathing/progressive muscle relaxation and intense exercise: . for 2-3 minutes     Physical Activities: go for a walk, yoga, gardening, meditation, deep breathing and stretching     Focus on helpful " "thoughts:  \"This is temporary\", \"I will get through this\", \"It always passes\" and \"Ride the wave\"  Step 3: People and social settings that provide distraction:   Step 4: Remind myself of people and things that are important to me and worth living for:        Step 5: When I am in crisis, I can ask these people to help me use my safety plan:     Step 6: Making the environment safe:     remove alcohol and remove drugs  Step 7: Professionals or agencies I can contact during a crisis:    [facility] Daytime Number: [phone]    Suicide Prevention Lifeline: 0-978-031-TALK (8255)    Crisis Text Line Service (available 24 hours a day, 7 days a week): Text [place] to 248781    Call 911 or go to my nearest emergency department.   I helped develop this safety plan and agree to use it when needed.  I have been given a copy of this plan.      Client signature _________________________________________________________________  Today s date:  3/16/2021  Adapted from Safety Plan Template 2008 Janee Casey and Caesar Lawrence is reprinted with the express permission of the authors.  No portion of the Safety Plan Template may be reproduced without the express, written permission.  You can contact the authors at bhs@[place].[facility] or [email or web address].          "

## 2021-03-16 NOTE — LETTER
March 16, 2021      Kristine Scott  [address] [place] [id number]        To Whom It May Concern:    Kristine Scott  was seen on   3/16/2021.  Please excuse her until 4/1/2021. Returning for re-evaluation in 10 days.         Sincerely,        TIAGO Estrada CNP

## 2021-03-16 NOTE — PROGRESS NOTES
Clinic Care Coordination Contact  Community Health Worker Initial Outreach    Patient accepts CC: Yes. Patient scheduled for assessment with  CC on 3/17 at 9am. Patient noted desire to discuss support and resources available to her.     The Clinic Community Health Worker spoke with the patient today to discuss possible Clinic Care Coordination enrollment. The service was described to the patient and immediate needs were discussed. The patient agreed to enrollment and an assessment was scheduled. The patient was provided with contact information for the clinic CHW.             Assessment date: 3/17 @9am    Isadora REYES Community Health Worker  [facility] Clinic Care Coordination  [facility]  Phone: [phone]

## 2021-03-17 ENCOUNTER — PATIENT OUTREACH (OUTPATIENT)
Dept: NURSING | Facility: CLINIC | Age: 51
End: 2021-03-17
Attending: NURSE PRACTITIONER
Payer: COMMERCIAL

## 2021-03-17 SDOH — ECONOMIC STABILITY: FOOD INSECURITY: WITHIN THE PAST 12 MONTHS, THE FOOD YOU BOUGHT JUST DIDN'T LAST AND YOU DIDN'T HAVE MONEY TO GET MORE.: NEVER TRUE

## 2021-03-17 SDOH — ECONOMIC STABILITY: TRANSPORTATION INSECURITY
IN THE PAST 12 MONTHS, HAS THE LACK OF TRANSPORTATION KEPT YOU FROM MEDICAL APPOINTMENTS OR FROM GETTING MEDICATIONS?: NO

## 2021-03-17 SDOH — ECONOMIC STABILITY: TRANSPORTATION INSECURITY
IN THE PAST 12 MONTHS, HAS LACK OF TRANSPORTATION KEPT YOU FROM MEETINGS, WORK, OR FROM GETTING THINGS NEEDED FOR DAILY LIVING?: NO

## 2021-03-17 SDOH — ECONOMIC STABILITY: FOOD INSECURITY: WITHIN THE PAST 12 MONTHS, YOU WORRIED THAT YOUR FOOD WOULD RUN OUT BEFORE YOU GOT MONEY TO BUY MORE.: NEVER TRUE

## 2021-03-17 SDOH — ECONOMIC STABILITY: INCOME INSECURITY: HOW HARD IS IT FOR YOU TO PAY FOR THE VERY BASICS LIKE FOOD, HOUSING, MEDICAL CARE, AND HEATING?: NOT ASKED

## 2021-03-17 ASSESSMENT — ACTIVITIES OF DAILY LIVING (ADL): DEPENDENT_IADLS:: INDEPENDENT

## 2021-03-17 ASSESSMENT — ANXIETY QUESTIONNAIRES: GAD7 TOTAL SCORE: 16

## 2021-03-17 NOTE — LETTER
[facility] CARE COORDINATION  [address] [facility] [address]    March 17, 2021    Kristine Scott  [address] [place] [phone]      Dear Kristine,    I am a clinic care coordinator who works with Noni Cuellar, TIAGO ARCEO. I wanted to thank you for spending the time to talk with me.  Below is a description of clinic care coordination and how I can further assist you.      The clinic care coordination team is made up of a registered nurse,  and community health worker who understand the health care system. The goal of clinic care coordination is to help you manage your health and improve access to the health care system in the most efficient manner. The team can assist you in meeting your health care goals by providing education, coordinating services, strengthening the communication among your providers and supporting you with any resource needs.    Please feel free to contact me at [phone] with any questions or concerns. We are focused on providing you with the highest-quality healthcare experience possible and that all starts with you.     Sincerely,     Jackie Kennedy [facility] Clinic Care Coordination  Tel: [phone]      Enclosed: I have enclosed a copy of the Complex Care Plan. This has helpful information and goals that we have talked about. Please keep this in an easy to access place to use as needed.

## 2021-03-17 NOTE — PROGRESS NOTES
Clinic Care Coordination Contact    Clinic Care Coordination Contact  OUTREACH    Referral Information:  Referral Source: PCP    Reason for Referral: Mental Wellness (Health) (Mental Illness/Chemical Depedency): Chemical Health Assessments     Additional pertinent details:  Increased ETOH use, would benefit from chemical dependency support. Hx of OCD,  Bipolar, and Major Depression/ anxiety. Has a psychiatrist. Working on getting seen sooner.     Clinical Staff have discussed the Care Coordination Referral with the patient and/or caregiver: yes    Primary Diagnosis: Psychosocial    Chief Complaint   Patient presents with     Clinic Care Coordination - Initial     Social Work        Universal Utilization:   Clinic Utilization  Difficulty keeping appointments:: No  Compliance Concerns: No  No-Show Concerns: No  No PCP office visit in Past Year: No  Utilization    Last refreshed: 3/17/2021  9:28 AM: Hospital Admissions 0           Last refreshed: 3/17/2021  9:28 AM: ED Visits 0           Last refreshed: 3/17/2021  9:28 AM: No Show Count (past year) 0              Current as of: 3/17/2021  9:28 AM              Clinical Concerns:  Current Medical Concerns:    Patient Active Problem List   Diagnosis     Bipolar disorder (H)     Coronary atherosclerosis of native coronary artery     Esophageal reflux     Essential hypertension     Female stress incontinence     Gastrointestinal problem     Gastrojejunal ulcer with perforation (H)     Heartburn     Internal hernia     Knee pain     Marginal ulcer     Migraine     Mixed hyperlipidemia     Moderate episode of recurrent major depressive disorder (H)     Morbid obesity (H)     Obsessive-compulsive disorder     Plantar fasciitis     Pneumoperitoneum     NAVA (obstructive sleep apnea)     Small bowel obstruction (H)     Stented coronary artery     DENICE (generalized anxiety disorder)     History of MI (myocardial infarction)     Upper back pain     Pulmonary nodules           Current Behavioral Concerns: Patient has an increase of depression and anxiety and is struggling with isolation of working from home.      Education Provided to patient: Patient was given information for her to get a rule 25 assessment as well as information for patient to get counseling    Pain  Pain (GOAL):: No  Health Maintenance Reviewed: Due/Overdue   Health Maintenance Due   Topic Date Due     PREVENTIVE CARE VISIT  Never done     ADVANCE CARE PLANNING  Never done     HIV SCREENING  Never done     COVID-19 Vaccine (1 of 2) Never done     HEPATITIS C SCREENING  Never done     PAP  Never done     DTAP/TDAP/TD IMMUNIZATION (1 - Tdap) Never done     LIPID  Never done     ZOSTER IMMUNIZATION (1 of 2) Never done       Clinical Pathway: none  Medication Management:  Current Outpatient Medications   Medication     acetaminophen (TYLENOL) 500 MG tablet     ALPRAZolam (XANAX) 0.25 MG tablet     ARIPiprazole (ABILIFY) 10 MG tablet     aspirin (ASA) 81 MG EC tablet     atorvastatin (LIPITOR) 40 MG tablet     buPROPion (WELLBUTRIN XL) 300 MG 24 hr tablet     cholecalciferol 50 MCG (2000 UT) tablet     cyclobenzaprine (FLEXERIL) 10 MG tablet     cyclobenzaprine (FLEXERIL) 5 MG tablet     DULoxetine (CYMBALTA) 60 MG capsule     EPINEPHrine (ANY BX GENERIC EQUIV) 0.3 MG/0.3ML injection 2-pack     lamoTRIgine (LAMICTAL) 200 MG tablet     levonorgestrel (MIRENA) 20 MCG/24HR IUD     meclizine (ANTIVERT) 25 MG tablet     nitroGLYcerin (NITROSTAT) 0.4 MG sublingual tablet     Omega-3 Fatty Acids (FISH OIL PEARLS) 183.33 MG CAPS     omeprazole (PRILOSEC) 40 MG DR capsule     Respiratory Therapy Supplies ([facility]) MISC     Sharps Container (SHARPS-A-GATOR LOCKING BRACKET) MISC     sucralfate (CARAFATE) 1 GM tablet     sulfamethoxazole-trimethoprim (BACTRIM DS) 800-160 MG tablet     traZODone (DESYREL) 50 MG tablet     vitamin B complex with vitamin C (VITAMIN  B COMPLEX) tablet     No current  facility-administered medications for this visit.      Patient self manages her medication     Functional Status:  Dependent ADLs:: Independent  Dependent IADLs:: Independent  Bed or wheelchair confined:: No  Mobility Status: Independent  Fallen 2 or more times in the past year?: No  Any fall with injury in the past year?: No    Living Situation:  Current living arrangement:: I live in a private home with family  Type of residence:: Private home - stairs    Lifestyle & Psychosocial Needs:     Social Needs     Financial resource strain: Not on file     Food insecurity     Worry: Never true     Inability: Never true     Transportation needs     Medical: No     Non-medical: No     Diet:: Regular  Inadequate nutrition (GOAL):: No  Tube Feeding: No  Inadequate activity/exercise (GOAL):: No  Significant changes in sleep pattern (GOAL): No  Transportation means:: Regular car     Baptist or spiritual beliefs that impact treatment:: No  Mental health DX:: Yes  Mental health DX how managed:: Medication, Psychiatrist  Mental health management concern (GOAL):: Yes  Chemical Dependency Status: Current Concern  Chemical Dependency Management: AA, Previous treatment  Informal Support system:: Spouse, Friends, Family   Socioeconomic History     Marital status:      Spouse name: Not on file     Number of children: Not on file     Years of education: Not on file     Highest education level: Not on file     Tobacco Use     Smoking status: Never Smoker     Smokeless tobacco: Never Used   Substance and Sexual Activity     Alcohol use: Yes     Comment: a bottle of wine a day      Drug use: Never     Sexual activity: Yes     Partners: Male     Birth control/protection: I.U.D., Female Surgical        SWCC called patient and introduced self, title and role. Patient reports she is pretty depressed right now and has been making bad choices. She is drinking more than she ever has in her life. She didn't drink for many years and now is  "drinking a lot. Patient is on leave for 2 weeks for mental health. She has been working from home.     [phone] to schedule a rule 25. [place] Rule 25. Patient was afraid her psychiatrist was out of network. She will be seeing him now that she knows she can with insurance willing to cover her seeing him. She needs a referral for Dr. Warren in order for insurance to cover it however. She is supposed to see someone on May 5th.    Patient has never been to . Patient would likely not go to AA. [facility] and patient discussed this as a potential option but patient declined. Patient was seeing a counselor but she switched medical providers and would like to see someone else. She works for the [employer] and did call her employee assistance program. She didn't feel like it was helpful. [facility] will send additional counselor information for patient to decide if she would like to see a counselor outside of [employer] network.    Patient felt the EAP program was not beneficial as \"they were overworked and underpaid\".     Resources and Interventions:  Current Resources:      Community Resources: None  Supplies used at home:: None  Equipment Currently Used at Home: none  Employment Status: employed full-time)   )    Advance Care Plan/Directive  Advanced Care Plans/Directives on file:: No  Advanced Care Plan/Directive Status: In Process    Referrals Placed: Behavioral Health Providers, CD     Goals:   Goals        General    Mental Health Management (pt-stated)     Notes - Note created  3/17/2021  9:28 AM by Jackie Kennedy LSW    Goal Statement: I will manage my mental health  Date Goal set: 03/17/21  Barriers: depression; anxiety  Strengths: would like to get better  Date to Achieve By: 09/17/21  Patient expressed understanding of goal: yes  Action steps to achieve this goal:  1. I will get a Rule 25  2. I will see a therapist  3. I will see psychiatry                Patient/Caregiver understanding: Patient verbalized understanding of " the plan    Outreach Frequency: monthly  Future Appointments              In 5 days Noni Cuellar, APRN CNP [facility]          Plan: Patient will schedule a rule 25. Pt will keep appts with psychiatry and patient will establish new care with a counselor. SWCC will send intro letter and CCP to patient.    Care Coordinator will review progress to goals and plan of care in 6 weeks.    TIFF Akhtar   [facility] Care Coordination  Tel: [phone]

## 2021-03-17 NOTE — LETTER
[facility]  Complex Care Plan  About Me:    Patient Name:  Kristine Scott    YOB: 1970  Age:         50 year old   [place] MRN:    [id number] Telephone Information:  Home Phone [phone]   Mobile [phone]       Address:  [address] [place], [place] [address] [address] Email address:  No e-mail address on record      Emergency Contact(s)    Name Relationship Lgl Grd Work Phone Home Phone Mobile Phone   CLINTON BOOKER Spouse   [phone]            Primary language:  English     needed? No   [place] [employer] [facility]:  [phone] op. 1  Other communication barriers: None  Preferred Method of Communication:     Current living arrangement: I live in a private home with family  Mobility Status/ Medical Equipment: Independent    Health Maintenance  Health Maintenance Reviewed: Due/Overdue   Health Maintenance Due   Topic Date Due     PREVENTIVE CARE VISIT  Never done     ADVANCE CARE PLANNING  Never done     HIV SCREENING  Never done     COVID-19 Vaccine (1 of 2) Never done     HEPATITIS C SCREENING  Never done     PAP  Never done     DTAP/TDAP/TD IMMUNIZATION (1 - Tdap) Never done     LIPID  Never done     ZOSTER IMMUNIZATION (1 of 2) Never done         My Access Plan  Medical Emergency 911   Primary Clinic Line [facility] - [phone]   24 Hour Appointment Line [phone] or  [phone]) (toll-free)   24 Hour Nurse Line [phone] (toll-free)   Preferred Urgent Care [facility], [phone]   Preferred Hospital [place], [place]  [phone]   Preferred Pharmacy [facility] - [place], [place] - [address] [facility] [facility]     Behavioral Health Crisis Line The National Suicide Prevention Lifeline at 1-610.134.3425 or 911             My Care Team Members  Patient Care Team       Relationship Specialty Notifications Start End    Noni Cuellar, APRN CNP PCP - General  Nurse Practitioner - Family  1/15/21     Phone: [phone] Fax: [phone] [address] [facility] [address]    Zuleyma Head APRN CNP Assigned PCP   1/21/21     Phone: [phone] Fax: [phone] [address] [facility] [address]    Magnus Nielsen MD Assigned Pulmonology Provider   2/7/21     Phone: [phone] Fax: [phone] [address] [facility] [address]    Isadora Granado Community Health Worker Primary Care - CC  3/17/21     Phone: [phone]                 My Care Plans  Self Management and Treatment Plan  Goals and (Comments)  Goals        General    Mental Health Management (pt-stated)     Notes - Note created  3/17/2021  9:28 AM by Jackie Kennedy LSW    Goal Statement: I will manage my mental health  Date Goal set: 03/17/21  Barriers: depression; anxiety  Strengths: would like to get better  Date to Achieve By: 09/17/21  Patient expressed understanding of goal: yes  Action steps to achieve this goal:  1. I will get a Rule 25  2. I will see a therapist  3. I will see psychiatry                 Action Plans on File:                       Advance Care Plans/Directives Type:        My Medical and Care Information  Problem List   Patient Active Problem List   Diagnosis     Bipolar disorder (H)     Coronary atherosclerosis of native coronary artery     Esophageal reflux     Essential hypertension     Female stress incontinence     Gastrointestinal problem     Gastrojejunal ulcer with perforation (H)     Heartburn     Internal hernia     Knee pain     Marginal ulcer     Migraine     Mixed hyperlipidemia     Moderate episode of recurrent major depressive disorder (H)     Morbid obesity (H)     Obsessive-compulsive disorder     Plantar fasciitis     Pneumoperitoneum     NAVA (obstructive sleep apnea)     Small bowel obstruction (H)     Stented coronary artery     DENICE (generalized anxiety disorder)     History of MI (myocardial infarction)     Upper  back pain     Pulmonary nodules      Current Medications and Allergies:  See printed Medication Report.    Care Coordination Start Date: 3/17/2021   Frequency of Care Coordination: monthly   Form Last Updated: 03/17/2021

## 2021-03-19 ENCOUNTER — TELEPHONE (OUTPATIENT)
Dept: FAMILY MEDICINE | Facility: CLINIC | Age: 51
End: 2021-03-19

## 2021-03-19 NOTE — TELEPHONE ENCOUNTER
**When finished: Please make a copy for station  and a copy to be sent to scanning. Send originals with patient.     Date received: March 19, 2021   Doctor: Noni Chow phone number:   : [phone]    n: Mail back to patient  y: Mail or fax to destination requesting form  n: Patient to     Other notes:

## 2021-03-22 ENCOUNTER — OFFICE VISIT (OUTPATIENT)
Dept: FAMILY MEDICINE | Facility: CLINIC | Age: 51
End: 2021-03-22
Payer: COMMERCIAL

## 2021-03-22 ENCOUNTER — HOSPITAL ENCOUNTER (OUTPATIENT)
Dept: BEHAVIORAL HEALTH | Facility: CLINIC | Age: 51
Discharge: HOME | End: 2021-03-22
Attending: FAMILY MEDICINE | Admitting: FAMILY MEDICINE
Payer: COMMERCIAL

## 2021-03-22 VITALS
HEART RATE: 130 BPM | OXYGEN SATURATION: 98 % | SYSTOLIC BLOOD PRESSURE: 108 MMHG | WEIGHT: 240 LBS | RESPIRATION RATE: 16 BRPM | HEIGHT: 70 IN | BODY MASS INDEX: 34.36 KG/M2 | DIASTOLIC BLOOD PRESSURE: 60 MMHG | TEMPERATURE: 98.7 F

## 2021-03-22 VITALS — BODY MASS INDEX: 34.36 KG/M2 | WEIGHT: 240 LBS | HEIGHT: 70 IN

## 2021-03-22 DIAGNOSIS — F31.9 BIPOLAR AFFECTIVE DISORDER, REMISSION STATUS UNSPECIFIED (H): Primary | ICD-10-CM

## 2021-03-22 DIAGNOSIS — F42.2 MIXED OBSESSIONAL THOUGHTS AND ACTS: ICD-10-CM

## 2021-03-22 DIAGNOSIS — F41.1 GAD (GENERALIZED ANXIETY DISORDER): ICD-10-CM

## 2021-03-22 DIAGNOSIS — F10.10 ETOH ABUSE: ICD-10-CM

## 2021-03-22 DIAGNOSIS — F33.1 MODERATE EPISODE OF RECURRENT MAJOR DEPRESSIVE DISORDER (H): ICD-10-CM

## 2021-03-22 PROCEDURE — H0001 ALCOHOL AND/OR DRUG ASSESS: HCPCS | Mod: 95

## 2021-03-22 PROCEDURE — 99213 OFFICE O/P EST LOW 20 MIN: CPT | Performed by: NURSE PRACTITIONER

## 2021-03-22 ASSESSMENT — PATIENT HEALTH QUESTIONNAIRE - PHQ9
5. POOR APPETITE OR OVEREATING: MORE THAN HALF THE DAYS
SUM OF ALL RESPONSES TO PHQ QUESTIONS 1-9: 21

## 2021-03-22 ASSESSMENT — COLUMBIA-SUICIDE SEVERITY RATING SCALE - C-SSRS
TOTAL  NUMBER OF INTERRUPTED ATTEMPTS PAST 3 MONTHS: NO
1. WITHIN THE PAST MONTH, HAVE YOU WISHED YOU WERE DEAD OR WISHED YOU COULD GO TO SLEEP AND NOT WAKE UP?: YES
1. IN THE PAST MONTH, HAVE YOU WISHED YOU WERE DEAD OR WISHED YOU COULD GO TO SLEEP AND NOT WAKE UP?: YES
ATTEMPT PAST THREE MONTHS: NO
2. IN THE PAST MONTH, HAVE YOU ACTUALLY HAD ANY THOUGHTS OF KILLING YOURSELF?: NO
2. HAVE YOU ACTUALLY HAD ANY THOUGHTS OF KILLING YOURSELF?: YES
TOTAL  NUMBER OF INTERRUPTED ATTEMPTS LIFETIME: NO
4. HAVE YOU HAD THESE THOUGHTS AND HAD SOME INTENTION OF ACTING ON THEM?: NO
TOTAL  NUMBER OF ABORTED OR SELF INTERRUPTED ATTEMPTS PAST 3 MONTHS: NO
6. HAVE YOU EVER DONE ANYTHING, STARTED TO DO ANYTHING, OR PREPARED TO DO ANYTHING TO END YOUR LIFE?: NO
REASONS FOR IDEATION PAST MONTH: MOSTLY TO END OR STOP THE PAIN (YOU COULDN'T GO ON LIVING WITH THE PAIN OR HOW YOU WERE FEELING)
6. HAVE YOU EVER DONE ANYTHING, STARTED TO DO ANYTHING, OR PREPARED TO DO ANYTHING TO END YOUR LIFE?: NO
5. HAVE YOU STARTED TO WORK OUT OR WORKED OUT THE DETAILS OF HOW TO KILL YOURSELF? DO YOU INTEND TO CARRY OUT THIS PLAN?: NO
5. HAVE YOU STARTED TO WORK OUT OR WORKED OUT THE DETAILS OF HOW TO KILL YOURSELF? DO YOU INTEND TO CARRY OUT THIS PLAN?: NO
REASONS FOR IDEATION LIFETIME: MOSTLY TO END OR STOP THE PAIN (YOU COULDN'T GO ON LIVING WITH THE PAIN OR HOW YOU WERE FEELING)
3. HAVE YOU BEEN THINKING ABOUT HOW YOU MIGHT KILL YOURSELF?: YES
6. HAVE YOU EVER DONE ANYTHING, STARTED TO DO ANYTHING, OR PREPARED TO DO ANYTHING TO END YOUR LIFE?: NO
ATTEMPT LIFETIME: NO
4. HAVE YOU HAD THESE THOUGHTS AND HAD SOME INTENTION OF ACTING ON THEM?: NO
TOTAL  NUMBER OF ABORTED OR SELF INTERRUPTED ATTEMPTS PAST LIFETIME: NO
1. IN THE PAST MONTH, HAVE YOU WISHED YOU WERE DEAD OR WISHED YOU COULD GO TO SLEEP AND NOT WAKE UP?: NO

## 2021-03-22 ASSESSMENT — ANXIETY QUESTIONNAIRES
2. NOT BEING ABLE TO STOP OR CONTROL WORRYING: NEARLY EVERY DAY
5. BEING SO RESTLESS THAT IT IS HARD TO SIT STILL: MORE THAN HALF THE DAYS
1. FEELING NERVOUS, ANXIOUS, OR ON EDGE: NEARLY EVERY DAY
3. WORRYING TOO MUCH ABOUT DIFFERENT THINGS: NEARLY EVERY DAY
6. BECOMING EASILY ANNOYED OR IRRITABLE: MORE THAN HALF THE DAYS
IF YOU CHECKED OFF ANY PROBLEMS ON THIS QUESTIONNAIRE, HOW DIFFICULT HAVE THESE PROBLEMS MADE IT FOR YOU TO DO YOUR WORK, TAKE CARE OF THINGS AT HOME, OR GET ALONG WITH OTHER PEOPLE: EXTREMELY DIFFICULT
7. FEELING AFRAID AS IF SOMETHING AWFUL MIGHT HAPPEN: SEVERAL DAYS
GAD7 TOTAL SCORE: 16

## 2021-03-22 ASSESSMENT — PAIN SCALES - GENERAL: PAINLEVEL: NO PAIN (0)

## 2021-03-22 ASSESSMENT — MIFFLIN-ST. JEOR
SCORE: 1783.88
SCORE: 1783.88

## 2021-03-22 NOTE — PROGRESS NOTES
"    Assessment & Plan       ICD-10-CM    1. Bipolar affective disorder, remission status unspecified (H)  F31.9    2. DENICE (generalized anxiety disorder)  F41.1    3. Moderate episode of recurrent major depressive disorder (H)  F33.1    4. Mixed obsessional thoughts and acts  F42.2    5. ETOH abuse  F10.10      Significant improvement since being seen in clinic on 3/16.  Care coordination has reached out to the patient and assessments have been completed.  She has been able to follow-up with her psychiatrist Dr. Warren on 3/18.  Opted to start ketamine infusions which she has done in the past.  No other medication changes were made.  FMLA paperwork to be brought to her psychiatrist.  Recommend following up for annual physical.  She may certainly follow-up sooner as needed.         BMI:   Estimated body mass index is 34.44 kg/m  as calculated from the following:    Height as of this encounter: 1.778 m (5' 10\").    Weight as of this encounter: 108.9 kg (240 lb).       Depression Screening Follow Up    PHQ 3/22/2021   PHQ-9 Total Score 21   Q9: Thoughts of better off dead/self-harm past 2 weeks Several days     Last PHQ-9 3/22/2021   1.  Little interest or pleasure in doing things 2   2.  Feeling down, depressed, or hopeless 2   3.  Trouble falling or staying asleep, or sleeping too much 2   4.  Feeling tired or having little energy 3   5.  Poor appetite or overeating 3   6.  Feeling bad about yourself 3   7.  Trouble concentrating 3   8.  Moving slowly or restless 2   Q9: Thoughts of better off dead/self-harm past 2 weeks 1   PHQ-9 Total Score 21   Difficulty at work, home, or with people Extremely dIfficult         C-SSRS ([place]rief [place]) 3/22/2021   Within the last month, have you wished you were dead or wished you could go to sleep and not wake up? Yes   Within the last month, have you had any actual thoughts of killing yourself? No   Within the last month, have you ever done anything, started to do anything, or " prepared to do anything to end your life? No       Follow Up  Follow Up Actions Taken  Crisis resource information provided in the After Visit Summary    Discussed the following ways the patient can remain in a safe environment:  remove alcohol and remove drugs      Return in about 3 months (around 6/22/2021), or if symptoms worsen or fail to improve, for Routine preventive.    TIAGO Estrada CNP  [facility]    Eddie Carmona is a 50 year old who presents for the following health issues     HPI     Depression and Anxiety Follow-Up    How are you doing with your depression since your last visit? Improved     How are you doing with your anxiety since your last visit?  Improved     Are you having other symptoms that might be associated with depression or anxiety? No    Have you had a significant life event? No     Do you have any concerns with your use of alcohol or other drugs? Yes:  drinking 1 bottle of wine daily . Has not drank in 1 week. She did a chemical assessment this morning and will be starting to get resources in place.     She is needing [facility] paperwork completed and will have psych do this. She has her next follow up with her psychiatrist on Thursday 3/25. She is taking medications as prescribed. No changes to medications except now doing Ketamine infusion. She has done this in the past and it was helpful.     Social History     Tobacco Use     Smoking status: Never Smoker     Smokeless tobacco: Never Used   Substance Use Topics     Alcohol use: Yes     Comment: a bottle of wine a day      Drug use: Never     PHQ 3/16/2021 3/16/2021 3/22/2021   PHQ-9 Total Score 24 24 21   Q9: Thoughts of better off dead/self-harm past 2 weeks Several days Several days Several days     DENICE-7 SCORE 3/16/2021 3/16/2021 3/22/2021   Total Score 16 16 16     Last PHQ-9 3/22/2021   1.  Little interest or pleasure in doing things 2   2.  Feeling down, depressed, or hopeless 2   3.  Trouble  falling or staying asleep, or sleeping too much 2   4.  Feeling tired or having little energy 3   5.  Poor appetite or overeating 3   6.  Feeling bad about yourself 3   7.  Trouble concentrating 3   8.  Moving slowly or restless 2   Q9: Thoughts of better off dead/self-harm past 2 weeks 1   PHQ-9 Total Score 21   Difficulty at work, home, or with people Extremely dIfficult     DENICE-7  3/22/2021   1. Feeling nervous, anxious, or on edge 3   2. Not being able to stop or control worrying 3   3. Worrying too much about different things 3   4. Trouble relaxing 2   5. Being so restless that it is hard to sit still 2   6. Becoming easily annoyed or irritable 2   7. Feeling afraid, as if something awful might happen 1   DENICE-7 Total Score 16   If you checked any problems, how difficult have they made it for you to do your work, take care of things at home, or get along with other people? Extremely difficult        C-SSRS (Brief [place]) 3/22/2021   Within the last month, have you wished you were dead or wished you could go to sleep and not wake up? Yes   Within the last month, have you had any actual thoughts of killing yourself? No   Within the last month, have you ever done anything, started to do anything, or prepared to do anything to end your life? No       Follow Up Actions Taken  Crisis resource information provided in the After Visit Summary  Referred patient back to mental health provider     Discussed the following ways the patient can remain in a safe environment:  remove alcohol and remove drugs  Suicide Assessment Five-step Evaluation and Treatment (SAFE-T)      How many servings of fruits and vegetables do you eat daily?  0-1    On average, how many sweetened beverages do you drink each day (Examples: soda, juice, sweet tea, etc.  Do NOT count diet or artificially sweetened beverages)?   3    How many days per week do you exercise enough to make your heart beat faster? 3 or less    How many minutes a day do you  "exercise enough to make your heart beat faster? 9 or less    How many days per week do you miss taking your medication? 0        Review of Systems   Constitutional, HEENT, cardiovascular, pulmonary, gi and gu systems are negative, except as otherwise noted.      Objective    /60 (BP Location: Right arm, Patient Position: Chair, Cuff Size: Adult Large)   Pulse 130   Temp 98.7  F (37.1  C)   Resp 16   Ht 1.778 m (5' 10\")   Wt 108.9 kg (240 lb)   SpO2 98%   BMI 34.44 kg/m    Body mass index is 34.44 kg/m .     Physical Exam   GENERAL: healthy, alert and no distress  NECK: no adenopathy, no asymmetry, masses, or scars and thyroid normal to palpation  RESP: lungs clear to auscultation - no rales, rhonchi or wheezes  CV: regular rate and rhythm, normal S1 S2, no S3 or S4, no murmur, click or rub, no peripheral edema and peripheral pulses strong  ABDOMEN: soft, nontender, no hepatosplenomegaly, no masses and bowel sounds normal  MS: no gross musculoskeletal defects noted, no edema                "

## 2021-03-22 NOTE — PATIENT INSTRUCTIONS
Our Clinic hours are:  Mondays    7:20 am - 7 pm  Tues -  Fri  7:20 am - 5 pm    Clinic Phone: [phone]    The clinic lab opens at 7:30 am Mon - Fri and appointments are required.    [facility]. [phone]  Monday  8 am - 7pm  Tues - Fri 8 am - 5:30 pm

## 2021-03-22 NOTE — PROGRESS NOTES
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 consider participation in a Harm Reduction Program such as those offered by [facility] or [facility].  4) Should you decide to drink alcohol, follow the National [place] on Alcohol Abuse and Alcoholism guidelines, consuming no more than 3 drinks on any day and no more than 7 drinks per week.  5)  Follow all the recommendations of your treatment/medical providers.  6)  Continue to attend your individual psychotherapy appointments and discuss substance use and/or abuse in those sessions.  7) Should you continue to struggle with binge use and/or alcohol abuse, consider entering a Substance Use Disorder Program.     (securely emailed to the pt on 03/23/2021)    3. Mental Health Referrals:  Pt plans to work with her therapist on addiction and MH concerns.  She has an appointment on 5/19/2021.  She will follow up with her psychiatrist.    4. Patient's identified no special considerations needed for tx..     5. Recommendations for treatment focus:   Depressed Mood - DX  Anxiety - DX  Alcohol / Substance Use - Alcohol Abuse.  Childhood abuse.   Bipolar-DX      Provider Name/ Credentials:  AD Garcia   March 22, 2021  [phone]  [email or web address]                  "

## 2021-03-23 ASSESSMENT — ANXIETY QUESTIONNAIRES: GAD7 TOTAL SCORE: 16

## 2021-03-23 NOTE — ADDENDUM NOTE
Enc[facility] add[facility] on: 3/23/2021 9:26 AM   Actions taken: Clinical Note Signed

## 2021-03-25 NOTE — TELEPHONE ENCOUNTER
Put on Noni Cuellar's desk to see if she wants to do form or send to psychologist since she had them do the FMLA form  Noni Mooney CMA

## 2021-03-25 NOTE — TELEPHONE ENCOUNTER
Form faxed to Psychiatrist Keith Warren at [phone], to fill out, since he filled out the FMLA forms  Noni Vinicio CMA

## 2021-03-26 NOTE — ADDENDUM NOTE
Encounter addended by: Nika Shin LADC on: 3/26/2021 8:31 AM   Actions taken: Charge Capture section accepted

## 2021-04-27 ENCOUNTER — PATIENT OUTREACH (OUTPATIENT)
Dept: NURSING | Facility: CLINIC | Age: 51
End: 2021-04-27
Payer: COMMERCIAL

## 2021-04-27 ENCOUNTER — PATIENT OUTREACH (OUTPATIENT)
Dept: CARE COORDINATION | Facility: CLINIC | Age: 51
End: 2021-04-27

## 2021-04-27 NOTE — PROGRESS NOTES
Clinic Care Coordination Contact    Care Coordination Clinician Chart Review  Situation: Patient chart reviewed by care coordinator.       Background: Care Coordination initial assessment and enrollment to Care Coordination was successful.   Patient centered goals were developed with participation from patient.  [facility] handed patient off to CHW for continued outreach every 30 days.        Assessment: Per chart review, patient outreach completed by CC CHW on 4/27/21 .  Patient is actively working to accomplish goals.  Patient's goal remains appropriate and relevant at this time.   Patient is not yet due for updated Complex Care Plan.  Annual assessment will be due 3/17/21.      Goals       Mental Health Management (pt-stated)      Goal Statement: I will manage my mental health  Date Goal set: 03/17/21  Barriers: depression; anxiety  Strengths: would like to get better  Date to Achieve By: 09/17/21  Patient expressed understanding of goal: yes  Action steps to achieve this goal:  1. I will get a Rule 25  2. I will see a therapist  3. I will see psychiatry                    Plan/Recommendations: The patient will continue working with Care Coordination to achieve goals as above.  Patient reports completing all goals and being ready to move to maintenance with tentative graduation at next outreach. [facility] will continue to monitor progress and CHW outreaches every 2 months, 2 weeks as patient is now in maintenance.   Care Plan updated and mailed to patient: TIFF Smith   [facility] Care Coordination  Tel: [phone]

## 2021-04-27 NOTE — PROGRESS NOTES
Clinic Care Coordination Contact  Community Health Worker Follow Up    Goals:   Goals Addressed as of 4/27/2021 at 11:49 AM                 Today       Patient Stated      Mental Health Management (pt-stated)   100%    Added 3/17/21 by Jackie Kennedy LSW     Goal Statement: I will manage my mental health  Date Goal set: 03/17/21  Barriers: depression; anxiety  Strengths: would like to get better  Date to Achieve By: 09/17/21  Patient expressed understanding of goal: yes  Action steps to achieve this goal:  1. I will get a Rule 25  2. I will see a therapist  3. I will see psychiatry            Intervention and Education during outreach: Monthly    CHW Plan: Patient stated she feels she has made significant progress to her current goals and requests to move to a 2 month outreach and possible graduation if there are no needs then. CHW will route encounter to [facility] to review the chart to confirm if maintenance and future graduation is appropriate/approved.  Please reach out with any questions, concerns or delegations.  Thank you!    Isadora REYES  Community Health Worker  [facility]  Clinic Care Coordination  [facility][1]  [email or web address]   Office: [phone]

## 2021-05-05 ENCOUNTER — COMMUNICATION - HEALTHEAST (OUTPATIENT)
Dept: LAB | Facility: CLINIC | Age: 51
End: 2021-05-05

## 2021-05-05 DIAGNOSIS — Z98.84 S/P BARIATRIC SURGERY: ICD-10-CM

## 2021-05-05 DIAGNOSIS — K91.2 POSTOPERATIVE INTESTINAL MALABSORPTION: ICD-10-CM

## 2021-05-05 DIAGNOSIS — K91.2 POSTOPERATIVE MALABSORPTION: ICD-10-CM

## 2021-05-12 ENCOUNTER — AMBULATORY - HEALTHEAST (OUTPATIENT)
Dept: LAB | Facility: CLINIC | Age: 51
End: 2021-05-12

## 2021-05-12 DIAGNOSIS — K91.2 POSTOPERATIVE MALABSORPTION: ICD-10-CM

## 2021-05-12 DIAGNOSIS — Z98.84 S/P BARIATRIC SURGERY: ICD-10-CM

## 2021-05-12 DIAGNOSIS — K91.2 POSTOPERATIVE INTESTINAL MALABSORPTION: ICD-10-CM

## 2021-05-12 LAB
ALBUMIN SERPL-MCNC: 3.9 G/DL (ref 3.5–5)
ALP SERPL-CCNC: 103 U/L (ref 45–120)
ALT SERPL W P-5'-P-CCNC: 125 U/L (ref 0–45)
ANION GAP SERPL CALCULATED.3IONS-SCNC: 10 MMOL/L (ref 5–18)
AST SERPL W P-5'-P-CCNC: 100 U/L (ref 0–40)
BILIRUB SERPL-MCNC: 0.7 MG/DL (ref 0–1)
BUN SERPL-MCNC: 12 MG/DL (ref 8–22)
CALCIUM SERPL-MCNC: 9.2 MG/DL (ref 8.5–10.5)
CHLORIDE BLD-SCNC: 103 MMOL/L (ref 98–107)
CHOLEST SERPL-MCNC: 148 MG/DL
CO2 SERPL-SCNC: 29 MMOL/L (ref 22–31)
CREAT SERPL-MCNC: 1.06 MG/DL (ref 0.6–1.1)
ERYTHROCYTE [DISTWIDTH] IN BLOOD BY AUTOMATED COUNT: 12.8 % (ref 11–14.5)
FASTING STATUS PATIENT QL REPORTED: NORMAL
FERRITIN SERPL-MCNC: 54 NG/ML (ref 10–130)
FOLATE SERPL-MCNC: >20 NG/ML
GFR SERPL CREATININE-BSD FRML MDRD: 55 ML/MIN/1.73M2
GLUCOSE BLD-MCNC: 79 MG/DL (ref 70–125)
HBA1C MFR BLD: 4.9 %
HCT VFR BLD AUTO: 43.4 % (ref 35–47)
HDLC SERPL-MCNC: 82 MG/DL
HGB BLD-MCNC: 14.2 G/DL (ref 12–16)
LDLC SERPL CALC-MCNC: 54 MG/DL
MCH RBC QN AUTO: 31.8 PG (ref 27–34)
MCHC RBC AUTO-ENTMCNC: 32.7 G/DL (ref 32–36)
MCV RBC AUTO: 97 FL (ref 80–100)
PLATELET # BLD AUTO: 281 THOU/UL (ref 140–440)
PMV BLD AUTO: 9.9 FL (ref 7–10)
POTASSIUM BLD-SCNC: 4.5 MMOL/L (ref 3.5–5)
PROT SERPL-MCNC: 6.9 G/DL (ref 6–8)
PTH-INTACT SERPL-MCNC: 59 PG/ML (ref 10–86)
RBC # BLD AUTO: 4.46 MILL/UL (ref 3.8–5.4)
SODIUM SERPL-SCNC: 142 MMOL/L (ref 136–145)
TRIGL SERPL-MCNC: 60 MG/DL
VIT B12 SERPL-MCNC: >2000 PG/ML (ref 213–816)
WBC: 8.8 THOU/UL (ref 4–11)

## 2021-05-13 ENCOUNTER — OFFICE VISIT - HEALTHEAST (OUTPATIENT)
Dept: SURGERY | Facility: CLINIC | Age: 51
End: 2021-05-13

## 2021-05-13 DIAGNOSIS — R74.8 ELEVATED LIVER ENZYMES: ICD-10-CM

## 2021-05-13 DIAGNOSIS — K91.2 POSTOPERATIVE MALABSORPTION: ICD-10-CM

## 2021-05-13 DIAGNOSIS — R94.4 DECREASED GFR: ICD-10-CM

## 2021-05-13 DIAGNOSIS — R63.2 HYPERPHAGIA: ICD-10-CM

## 2021-05-13 DIAGNOSIS — K28.5 GASTROJEJUNAL ULCER WITH PERFORATION (H): ICD-10-CM

## 2021-05-13 LAB — 25(OH)D3 SERPL-MCNC: 42.6 NG/ML (ref 30–80)

## 2021-05-13 ASSESSMENT — MIFFLIN-ST. JEOR: SCORE: 1819.71

## 2021-05-14 LAB — ZINC SERPL-MCNC: 113.7 UG/DL (ref 60–120)

## 2021-05-15 LAB
ANNOTATION COMMENT IMP: NORMAL
VIT A SERPL-MCNC: 0.74 MG/L (ref 0.3–1.2)
VITAMIN A (RETINYL PALMITATE): 0.04 MG/L (ref 0–0.1)

## 2021-05-16 LAB — VIT B1 PYROPHOSHATE BLD-SCNC: 93 NMOL/L (ref 70–180)

## 2021-05-27 ENCOUNTER — COMMUNICATION - HEALTHEAST (OUTPATIENT)
Dept: SURGERY | Facility: CLINIC | Age: 51
End: 2021-05-27

## 2021-05-27 VITALS
DIASTOLIC BLOOD PRESSURE: 70 MMHG | SYSTOLIC BLOOD PRESSURE: 110 MMHG | HEIGHT: 70 IN | WEIGHT: 249 LBS | BODY MASS INDEX: 35.65 KG/M2

## 2021-05-28 NOTE — PROGRESS NOTES
"Post-op Surgical Weight Loss Diet Evaluation     Assessment:  Pt presents for 3 months post-op RD visit, s/p RNY on 1/29.19 with Dr. Connor. Today we reviewed current eating habits and level of physical activity, and instructed on the changes that are required for successful bariatric outcomes.    Patient Progress: tolerating foods and fluids well; no concerns.     Pt's Initial Weight: 309 lbs  Weight: (!) 239 lb 1.6 oz (108.5 kg)  Weight loss from initial: 69.9  % Weight loss: 22.62 %      Body mass index is 34.31 kg/m .     Concerns: Poor protein intake.     Vitamins   Multi Vit with Iron: yes  Calcium Citrate: yes  B12: yes  D3: yes  B-complex    Do you experience hunger? No   Do you have \"dumping\" syndrome?none     How often?n/a    With what foods: n/a   Do you experience any reflux or discomfort with eating? No   -Are you still taking omeprazole? Is this new since you stopped taking it? No   Nausea: no  Vomiting: no  Diarrhea: no  Constipation: yes  Hair loss:yes    Diet Recall/Time:   Breakfast: protein shake (20g)   Am Snack: none   Lunch: salad with chicken (10g)   Pm snack:none   Dinner: meat, vegetable (10g)   HS Snack: cookie or carrots     Estimated protein intake: 40 grams    Estimated portion size per meals:1/2  cup/meal    Incorporation of vegetables, fruits, carbohydrates into diet/meals using   Bariatric \"Plate Method\"   The patient and I discussed the importance of including lean/low fat protein at each meal, including a vegetable/fruit, and limiting carbohydrate intake to less than 25% of plate volume. Always keeping within approved perimeters of post op meal portion sizes according to 3/6/9/12 months post op guidelines.    Meals per week away from home: rarely   Recommended limiting eating out to no more than 2x/week.    Meal Duration:15 minutes  Encouraged slowing meal times down, 20-30 minutes, chewing to applesauce consistency.     Fluid-meal separation:  Fluids are  30min before and " I performed a brief evaluation, including history and physical, of the patient here in triage and I have determined that pt will need further treatment and evaluation from the main side ER physician. I have placed initial orders to help in expediting patients care. May 25, 2017 at 12:57 PM - Pratima Longoria DO        There were no vitals taken for this visit. Pt took multiple pills three 1mg xanax, one 0.5mg valium, and ( her regular meds of 200mg lamictal, 1000mg diomox, 10mg norvasc)    Pt states she saw psychiatrist yesterday who asked her if she wanted to be admitted to hospital but she wanted to try change in meds. She states today she took meds because \"I dont want to feel anything anymore\". She does admit to being depressed. "30 minutes after meals.  The patient and I reviewed the anatomy of the bypass and why  fluids from a meal is so important.    Fluid Intake  Water: 64 oz  Caffeine: coffee     Discussed the importance of adequate hydration after surgery and the goal of 64+ oz of fluid daily.   The patient understands the importance of avoiding all carbonated, caffeinated, and sweetened drinks; and instead choosing 64oz plain water.    Exercise  Pt goes walking.     Pt's understands that 30-60 minutes of daily activity is an important part of bariatric surgery success.   Encouraged pt to incorporate strength training exercise along with cardiovascular exercise as well, most days of the week.      PES statement:    1. (NI-5.7.1) Inadequate protein intake related to Gastric bypass causing increased nutrient needs due to malabsorption/ Decreased ability to consume sufficient protein as evidenced by dull skin, thin and fragile hair; and Estimated intake of protein insufficient to meet requirements.       Intervention    Discussion  1. Discussed 3 months  Post-Op Nutritional Guidelines for RMU  2. Recommended to consume 15-20gm protein at 3 meals daily, along with protein supplement/\"planned protein containing snack\" of 15-30gm protein, to reach goal of 60-80 gm protein daily.  3. Placed emphasis on using the protein drink as a supplement, rather than a meal replacement at this time.   4. Educated on post-op vitamin regimen: Multi Vit + iron 2x/day, calcium citrate 400-600 mg 2x/day, 8366-3835 mcg of Sublingual B-12 daily, and 5000 IU Vitamin D3 daily (MVI and calcium can be taken at the same time BID)  5. Reviewed lean protein sources  6. Bariatric Plate Method-  including lean/low fat protein at each meal, including a vegetable/fruit, and limiting carbohydrate intake to less than 25% of plate volume. Approved perimeters of post op meal portion sizes according to  post op guidelines.    Instructions  1. Include 15-20gm protein " "at each meal, along with protein supplement/\"planned protein containing snack\" of 15-30gm protein, to reach goal of 60-80 gm protein daily.  2. Increase fluid intake to 64oz daily: choose plain or calorie/carbonation-free beverages.  3. Incorporate daily structured activity, 30-60 minutes most days of the week  4. Recommended pt to start taking: Multi Vit + iron 2x/day, calcium citrate 400-600 mg 2x/day, 8912-5405 mcg of Sublingual B-12 daily, and 5000 IU Vitamin D3 daily. (MVI and calcium can be taken at the same time)  5. Read food labels more consistently: keeping total fat grams <10, total sugar grams <10, fiber >3gm per serving.  6. Increase vegetable/fruit intake, by having a vegetable or fruit with each meal daily.  7. Practice plate method: 1/2 plate lean/low fat protein source, vegetable/fruit, <25% of plate complex carbohydrates.  8. Separate fluids 30 minutes before/after meal times.  9. Practice eating off of smaller plates/bowls, chewing to applesauce consistency, taking 20-30 minutes to eat in a calm/relaxed environment without distractions of tv/email/cell phone.    Handouts provided:  3 months  Post-Op Nutritional Guidelines for RNY    Monitor/Evaluation    Pt to follow up for 6 months  post-op visit with bariatrician       Time In: 2:45pm  Time Out: 3:00pm      ABN signed: Yes    "

## 2021-05-29 NOTE — TELEPHONE ENCOUNTER
Called and put her on the schedule with with the PA this afternoon for drain removal.  She would also like to see  this week so appt scheduled for Thursday.      Julia Rodriguez RN, [facility] Surgery and Bariatric Care  P [phone]  F [phone]

## 2021-05-29 NOTE — TELEPHONE ENCOUNTER
Per patient had a procedure and was discharged and informed to have follow up on the 28th. Patient was told that  would follow up with patient, and patient hasn't heard anything. Patient would like  to follow up with her.

## 2021-05-29 NOTE — PROGRESS NOTES
"HPI: Pt is here for follow up of laparoscopic repair of perforated marginal ulcer from wandy en Y done 1/29/19   she is doing well.  Pain is well controlled.  Had drain removed 2 days ago, decreased left shoulder pain since that time.  No difficulties with the surgical wound/wounds.  she is eating well and denies fever and chills, eager to advance to regular diet.     /72   Pulse 97   Resp 14   Ht 5' 10\" (1.778 m)   Wt (!) 224 lb 14.4 oz (102 kg)   SpO2 98%   BMI 32.27 kg/m       EXAM:  GENERAL:Appears well  ABDOMEN:  Soft, +BS  SURGICAL WOUNDS:  Incisions healing well    Assessment/Plan: Doing well.  Advised to continue on twice daily PPI therapy for 2 to 6 weeks, until we can perform upper endoscopy.  We will have her advance to regular diet this weekend.  Okay to resume her normal medications at this point time.    Ab Connor MD  [phone]  [facility] Surgery  "

## 2021-05-29 NOTE — ANESTHESIA POSTPROCEDURE EVALUATION
Patient: Kristine Scott  LAPAROSCOPIC REPAIR OF PERFORATED MARGINAL ULCER  Anesthesia type: general    Patient location: PACU  Last vitals:   Vitals Value Taken Time   /78 5/22/2019 12:15 AM   Temp 37.3  C (99.2  F) 5/22/2019 12:03 AM   Pulse 106 5/22/2019 12:23 AM   Resp 21 5/22/2019 12:23 AM   SpO2 96 % 5/22/2019 12:23 AM   Vitals shown include unvalidated device data.  Post vital signs: stable  Level of consciousness: awake, alert and oriented  Post-anesthesia pain: pain controlled  Post-anesthesia nausea and vomiting: no  Pulmonary: unassisted, nasal cannula  Cardiovascular: stable and blood pressure at baseline  Hydration: adequate  Anesthetic events: no    QCDR Measures:  ASA# 11 - Denice-op Cardiac Arrest: ASA11B - Patient did NOT experience unanticipated cardiac arrest  ASA# 12 - Denice-op Mortality Rate: ASA12B - Patient did NOT die  ASA# 13 - PACU Re-Intubation Rate: ASA13B - Patient did NOT require a new airway mgmt  ASA# 10 - Composite Anes Safety: ASA10A - No serious adverse event    Additional Notes:

## 2021-05-29 NOTE — TELEPHONE ENCOUNTER
"Pt called in this morning with multiple complaints and wondering if she should see her PCP or someone at our clinic.  She is 4 months s/p RNY with .  She says that her stomach hurts on the left side under her ribs, her shoulders hurt (both shoulders this morning and now mostly on the left), she has epigastric pain mostly on the left that is worse when she drinks water, feels nauseated and is constipated.  She says that she has had these issues at night for the past few days and this morning the pain in her shoulders \"brought her to her knees\".  I let her know that we don't have any openings in clinic today and if she can get in sooner with her PCP, she should do that or be seen somewhere if her symptoms are severe.  Pt verbalized understanding and will call if there's anything else we can help with.      Julia Rodriguez RN, N  [facility] Surgery and Bariatric Care  P [phone]  F [phone]    "

## 2021-05-29 NOTE — ANESTHESIA CARE TRANSFER NOTE
Last vitals:   Vitals:    05/22/19 0003   BP: 132/62   Pulse: (!) 108   Resp: 18   Temp: 37.3  C (99.2  F)   SpO2: 100%     Patient's level of consciousness is drowsy  Spontaneous respirations: yes  Maintains airway independently: yes  Dentition unchanged: yes  Oropharynx: oropharynx clear of all foreign objects    QCDR Measures:  ASA# 20 - Surgical Safety Checklist: WHO surgical safety checklist completed prior to induction    PQRS# 430 - Adult PONV Prevention: 4558F - Pt received => 2 anti-emetic agents (different classes) preop & intraop  ASA# 8 - Peds PONV Prevention: NA - Not pediatric patient, not GA or 2 or more risk factors NOT present  PQRS# 424 - Denice-op Temp Management: 4559F - At least one body temp DOCUMENTED => 35.5C or 95.9F within required timeframe  PQRS# 426 - PACU Transfer Protocol: - Transfer of care checklist used  ASA# 14 - Acute Post-op Pain: ASA14B - Patient did NOT experience pain >= 7 out of 10

## 2021-05-29 NOTE — ANESTHESIA PREPROCEDURE EVALUATION
Anesthesia Evaluation      Patient summary reviewed   History of anesthetic complications (PONV)     Airway   Mallampati: II  Neck ROM: full   Pulmonary - normal exam   (+) sleep apnea on CPAP, ,                          Cardiovascular - normal exam  Exercise tolerance: > or = 4 METS  (+) hypertension, past MI, CAD, CABG/stent (Stents x 2), cardiomyopathy, hypercholesterolemia,     ECG reviewed        Neuro/Psych    (+) depression, anxiety/panic attacks,     Endo/Other    (+) obesity,      GI/Hepatic/Renal    (-) GERD    Comments: S/p Teri-En-Y gastric bypass 1/2019.  Now with intra-abdominal free air     Other findings: 11/2016 echo  CONCLUSION:    Normal LV size, wall thickness, and systolic function.     Normal RV size and function.     Mild LA dilatation.     Trace to mild eccentric aortic regurgitation.     IVC is normal in size and responsive to inspiration indicating normal     RA pressure.       As compared to previous echo on 04/01/13, inferolateral hypokinesis     is not present and aortic regurgitation is new.      Left Ventricular Ejection Fraction: 55 %      Dental - normal exam                        Anesthesia Plan  Planned anesthetic: general endotracheal  RSI  Glidescope    Decadron  Zofran    ASA 3 - emergent   Induction: intravenous   Anesthetic plan and risks discussed with: patient  Anesthesia plan special considerations: video-assisted, rapid sequence induction, antiemetics,   Post-op plan: routine recovery

## 2021-05-29 NOTE — PROGRESS NOTES
Received [facility] paperwork on Kristine, and paperwork from [facility]. It has all been filled out, signed by Dr. Connor and faxed to appropriate places. Will all be sent to HIM to be scanned into the chart.     Alissa Guevara CMA ([facility])

## 2021-05-29 NOTE — PROGRESS NOTES
HPI: Pt is here for follow up of a drain removal. Underwent laparoscopic repair of perforated marginal ulcer from wandy en Y done 1/29/19   she is doing well.  Pain is well controlled.  No difficulties with the surgical wound/wounds.  she is eating well and denies fever and chills. ERICH output has been minimal to none  Has pain Right shoulder which bothers her the most. Tolerating full liquid diet and wishes she can be done with liquids.       /59 (Patient Site: Right Arm, Patient Position: Sitting, Cuff Size: Adult Large)   Pulse (!) 107   SpO2 96%   Breastfeeding? No     EXAM:  GENERAL:Appears well  ABDOMEN:  Soft, +BS  SURGICAL WOUNDS:  Incisions healing well, no enduration or drainage.- ERICH serous minimal scant  Removed erich- Had pain in right shoulder with removing.     Assessment/Plan: . Doing well. Seeing Dr Connor Thursday. Stay on full liquid diet.   Carol Archer PA-C  [facility] Department of Surgery

## 2021-06-01 VITALS — BODY MASS INDEX: 41.95 KG/M2 | WEIGHT: 293 LBS | HEIGHT: 70 IN

## 2021-06-01 VITALS — WEIGHT: 293 LBS | BODY MASS INDEX: 41.95 KG/M2 | HEIGHT: 70 IN

## 2021-06-01 VITALS — BODY MASS INDEX: 45.48 KG/M2 | WEIGHT: 293 LBS

## 2021-06-01 VITALS — WEIGHT: 293 LBS | HEIGHT: 70 IN | BODY MASS INDEX: 41.95 KG/M2

## 2021-06-02 VITALS — WEIGHT: 266 LBS | HEIGHT: 70 IN | BODY MASS INDEX: 38.08 KG/M2

## 2021-06-02 VITALS — WEIGHT: 289 LBS | HEIGHT: 70 IN | BODY MASS INDEX: 41.37 KG/M2

## 2021-06-02 VITALS — WEIGHT: 293 LBS | BODY MASS INDEX: 44.34 KG/M2

## 2021-06-02 VITALS — WEIGHT: 290 LBS | HEIGHT: 70 IN | BODY MASS INDEX: 41.52 KG/M2

## 2021-06-02 VITALS — WEIGHT: 290 LBS | BODY MASS INDEX: 41.52 KG/M2 | HEIGHT: 70 IN

## 2021-06-02 VITALS — WEIGHT: 293 LBS | BODY MASS INDEX: 41.95 KG/M2 | HEIGHT: 70 IN

## 2021-06-02 VITALS — HEIGHT: 70 IN | WEIGHT: 273.1 LBS | BODY MASS INDEX: 39.1 KG/M2

## 2021-06-02 VITALS — WEIGHT: 293 LBS | HEIGHT: 70 IN | BODY MASS INDEX: 41.95 KG/M2

## 2021-06-03 VITALS — WEIGHT: 233.4 LBS | HEIGHT: 70 IN | BODY MASS INDEX: 33.41 KG/M2

## 2021-06-03 VITALS — HEIGHT: 70 IN | WEIGHT: 214.7 LBS | BODY MASS INDEX: 30.74 KG/M2

## 2021-06-03 VITALS — WEIGHT: 224.9 LBS | HEIGHT: 70 IN | BODY MASS INDEX: 32.2 KG/M2

## 2021-06-03 VITALS — WEIGHT: 218.4 LBS | BODY MASS INDEX: 31.26 KG/M2 | HEIGHT: 70 IN

## 2021-06-03 VITALS — HEIGHT: 70 IN | WEIGHT: 239.1 LBS | BODY MASS INDEX: 34.23 KG/M2

## 2021-06-03 NOTE — PROGRESS NOTES
"Post-op Surgical Weight Loss Diet Evaluation     Assessment:  Pt presents for 9 months post-op RD visit, s/p RNY on 1/29/19 with Dr. Connor. Today we reviewed current eating habits and level of physical activity, and instructed on the changes that are required for successful bariatric outcomes.    Patient Progress: Various life events (mother's death, surgeries, etc.) has affected pt's mental health and resulted in her returning to some old eating habits, such as drinking coffee and snacking. Within past two weeks, pt has worked with therapist and improving lifestyle skills.     Pt's Initial Weight: 309 lbs  Weight: 214 lb 11.2 oz (97.4 kg)  Weight loss from initial: 94.3  % Weight loss: 30.52 %      Body mass index is 30.81 kg/m .     Concerns: snacking between meals, coffee intake.      Vitamins   Multi Vit with Iron: yes  Calcium Citrate: yes  B12: yes  D3: yes    Do you experience hunger? Yes   Do you have \"dumping\" syndrome?no    How often?n/a   With what foods: n/a   Do you experience any reflux or discomfort with eating? no  Nausea: no  Vomiting: no  Diarrhea: no  Constipation: no  Hair loss:no    Diet Recall/Time:   Breakfast: protein shake  Am Snack:  Grapes   Lunch: chicken, vegetables   Pm snack:granola   Dinner: meat, vegetables   HS Snack: grapes, cracker s    Estimated protein intake: 50-60 grams    Estimated portion size per meals:3/4 cup/meal    Meals per week away from home: 1x/week   Recommended limiting eating out to no more than 2x/week.    Meal Duration:20 minutes  Encouraged slowing meal times down, 20-30 minutes, chewing to applesauce consistency.     Fluid-meal separation:  Fluids are  30min before and 30 minutes after meals.  The patient and I reviewed the anatomy of the bypass and why  fluids from a meal is so important.    Fluid Intake  Water: 60 oz   Caffeine: multiple cups of coffee    Exercise  Recent back injury; pt plans to walk and return to workout return with her " "nephew once healed.       PES statement:    (NC-1.4) Altered GI Function related to Alteration in gastrointestinal tract structure and/or function/ Decreased functional length of the GI tract as evidenced by Weight loss of 30.5% initial body weight; Gastric bypass surgery.   Intervention    Discussion  1. Discussed 9 months  Post-Op Nutritional Guidelines for RNY.   2. Encouraged complex carbohydrate intake with a meal to prevent snacking.   3. Recommended to consume 15-20gm protein at 3 meals daily, along with protein supplement/\"planned protein containing snack\" of 15-30gm protein, to reach goal of 60-80 gm protein daily.  4. Educated on post-op vitamin regimen: Multi Vit + iron 2x/day, calcium citrate 400-600 mg 2x/day, 9468-4825 mcg of Sublingual B-12 daily, and 5000 IU Vitamin D3 daily (MVI and calcium can be taken at the same time BID)  5. Reviewed lean protein sources  6. Bariatric Plate Method-  including lean/low fat protein at each meal, including a vegetable/fruit, and limiting carbohydrate intake to less than 25% of plate volume. Approved perimeters of post op meal portion sizes according to post op guidelines.  Instructions  1. Include 15-20gm protein at each meal, along with protein supplement/\"planned protein containing snack\" of 15-30gm protein, to reach goal of 60-80 gm protein daily.  2. Increase fluid intake to 64oz daily: choose plain or calorie/carbonation-free beverages.  3. Incorporate daily structured activity, 30-60 minutes most days of the week  4. Recommended pt to start taking: Multi Vit + iron 2x/day, calcium citrate 400-600 mg 2x/day, 1743-2589 mcg of Sublingual B-12 daily, and 5000 IU Vitamin D3 daily. (MVI and calcium can be taken at the same time)  5. Read food labels more consistently: keeping total fat grams <10, total sugar grams <10, fiber >3gm per serving.  6. Increase vegetable/fruit intake, by having a vegetable or fruit with each meal daily.  7. Practice plate method: 1/2 " plate lean/low fat protein source, vegetable/fruit, <25% of plate complex carbohydrates.  8. Separate fluids 30 minutes before/after meal times.  9. Practice eating off of smaller plates/bowls, chewing to applesauce consistency, taking 20-30 minutes to eat in a calm/relaxed environment without distractions of tv/email/cell phone.    Handouts provided:  9 months  Post-Op Nutritional Guidelines for RNY    Monitor/Evaluation    Pt to follow up for 1 year  post-op visit with bariatrician       Time In: 9:00am  Time Out: 9:30am      ABN signed: Yes

## 2021-06-04 VITALS
WEIGHT: 200 LBS | HEIGHT: 70 IN | BODY MASS INDEX: 28.7 KG/M2 | HEIGHT: 70 IN | BODY MASS INDEX: 31.71 KG/M2 | WEIGHT: 200 LBS | BODY MASS INDEX: 28.7 KG/M2 | BODY MASS INDEX: 31.71 KG/M2

## 2021-06-05 NOTE — PATIENT INSTRUCTIONS - HE
[facility]  Nutritional Guidelines  Gastric Bypass 12 Months Post Op    General Guidelines and Helpful Hints:    Eat 3 meals per day + protein supplement(s). No snacks between meals.  o Do not skip meals.  This can cause overeating at the next meal and will prevent adequate protein and nutritional intake.    Aim for 60-80 grams of protein per day.  o Always eat your protein first. This assists with optimal nutrition and helps you stay full longer.  o Depending on your portion size, you may need to drink approved protein supplement between meals to achieve protein goals. Follow recommendations of your Dietitian.     Eat your protein first, and then follow with fiber.   o It is not necessary to count your fiber, but 15-20 grams per day is recommended.    o Add fiber by including fruits, vegetables, whole grains, and beans.     Portions should be about 1 cup per meal. Use measuring cups to be accurate.    Continue to use saucer/salad plates, infant/toddler silverware to keep portion sizes small and take small bites.    Eat S-L-O-W-L-Y to make each meal last 20-30 minutes. Always stop eating when satisfied.    Continue to use caution with foods containing skins, peels or membranes. Chew well!    Aim for 64 oz. of calorie-free fluids daily.  o Continue to avoid caffeine and carbonation. If you choose to drink alcohol, do so in moderation.   o Remember to avoid drinking during meals, 15-30 minutes before and 30 minutes after.    Exercise is wu for continued weight loss and weight maintenance. Aim for 30-60 minutes of physical activity most days of the week. Include cardiovascular and strength training.    If having trouble tolerating meat, try using a crock-pot, tinfoil tent, steamer or other moist cooking method to create tender meats. Add broth or low-fat gravy to help meat stay moist.     Avoid high sugar and high fat foods to prevent dumping syndrome.  o Check nutrition labels for less than 10 grams of  sugar and less than 10 grams of fat per serving.    Continue Taking Vitamins/Minerals:  o 6435-4683 mcg of Sublingual B-12 daily  o 1 Multivitamin with Iron twice daily (chewable or swallow tabs)  o 500-600 mg Calcium Citrate twice daily (chewable or swallow tabs)  o 5000 IU Vitamin D3 daily    Sample Grocery List    Protein:    Fat free Greek or light yogurt (less than 10 grams sugar)    Fat free or low-fat cottage cheese    String cheese or reduced fat cheese slices    Tuna, salmon, crab, egg, or chicken salad made with light or fat free mayonnaise    Egg or Egg Substitute    Lean/extra lean turkey, beef, bison, venison (ground, sirloin, round, flank)    Pork loin or tenderloin (grilled, baked, broiled)    Fish such as salmon, tuna, trout, tilapia, etc. (grilled, baked, broiled)    Tender cuts of lean (skinless) turkey or chicken    Lean deli meats: turkey, lean ham, chicken, lean roast beef    Beans such as kidney, garbanzo, black, elena, or low-fat/fat free refried beans    Peanut butter (natural preferred). Limit to 1 Tbsp. per day.    Low-fat meatloaf (made with lean ground beef or turkey)    Sloppy Joes made with low-sugar ketchup and lean ground beef or turkey    Soy or vegetable protein (i.e. vegan crumbles, soy/veggie burger, tofu)    Hummus    Vegetables:    Fresh: cooked or raw (as tolerated)    Frozen vegetables    Canned vegetables (low sodium or no salt added, rinse before cooking/eating)    (Ok to have skins/peels/membranes/seeds - just chew well)    Fruits:    Fresh fruit    Frozen fruit (no sugar added)    Canned fruit (packed in its own juice, NOT syrup)    (Ok to have skins/peels/membranes/seeds - just chew well)    Starch:    Unsweetened whole-grain hot cereal (or high fiber cold cereal, dry)    Toasted whole wheat bread or [place] Thins    Whole grain crackers    Baked/boiled/mashed potato/sweet potato    Cooked whole grain pasta, brown rice, or other cooked whole grains    Starchy vegetables:  corn, peas, winter squash      Protein Supplement:     Ready to drink protein shake with:  o 15-30 grams protein per serving  o Less than 10 grams total carbohydrate per serving     Protein powder mixed with:  o  Skim or 1% milk  o Low fat or fat free Lactaid milk, plain or no sugar added soymilk  o Water     Fats: (use in moderation)    1 teaspoon of soft tub margarine    1 teaspoon olive oil, canola oil, or peanut oil    1 tablespoon of low-fat anderson or salad dressing     Sample Menu for 12 months after Gastric Bypass    You do NOT need to eat/drink the full portion sizes listed below  Always stop when you are satisfied    Breakfast   cup 1% cottage cheese     cup diced peaches   Lunch   slice whole grain bread/toast with 1 tsp. light anderson  2 oz. sliced lean turkey, ham, or chicken    cup carrots   Supplement Approved protein supplement (as needed between meals)   Dinner   cup 93% lean ground beef mixed with 2 Tbsp. marinara sauce     cup green beans    cup whole grain pasta     Breakfast   cup egg substitute or 2 egg whites, scrambled   1 oz low fat shredded cheese    cup sautéed chopped vegetables mixed in   Lunch 1 cup chili made with lean ground beef or turkey   Supplement 6 oz light Greek yogurt (as needed)   Dinner 3 oz  grilled, broiled, or baked lemon pepper salmon  2 Tbsp. grilled asparagus  2 Tbsp. baked sweet potato     Breakfast   cup whole grain oatmeal made with skim milk and unflavored protein powder added    cup blueberries       Lunch 3 oz  meatloaf made with lean ground turkey    cup steamed broccoli   Dinner 3 oz pork loin made in a crock pot seasoned with a spice rub    cup cooked carrots   Supplement Approved protein supplement (as needed between meals)     Breakfast   cup egg scramble made with egg substitute and turkey sausage    whole grain English muffin with 1 teaspoon low sugar jelly   Lunch 3 oz seasoned, skinless grilled chicken     cup grilled vegetables   Dinner 2 oz lean beef    cup  brown rice    cup strawberries   Supplement 1 string cheese (as needed)     Breakfast 6 ounces light Greek yogurt    cup unsweetened mixed berries   Lunch 3 oz shrimp, with low-sugar cocktail sauce for dipping    cup pea pods   Supplement   cup fat free cottage cheese (as needed)   Dinner 3 oz tender turkey breast (made in crock pot for extra moisture)    of a small whole wheat dinner roll     Breakfast     cup low fat cottage cheese    cup strawberries   Lunch   cup black bean soup  4 whole grain crackers   Supplement 1 cup skim milk with scoop of protein powder added (as needed)   Dinner 3 oz. grilled tilapia with lemon pepper seasoning    cup grilled bell peppers     Breakfast 2 ounces turkey sausage mamie    whole wheat English muffin   Supplement Approved protein supplement (as needed between meals)   Lunch 3 oz lean ham, turkey, or chicken     cup tomatoes   Dinner 2 oz. sirloin steak    cup mixed vegetables    cup brown rice

## 2021-06-05 NOTE — PROGRESS NOTES
1 year post-op RNY  lab orders placed for patient and will be done at a HE lab  in preparation for appointment with Sherly Zamora MD on 2/6/2020.    Karyna Schulte RN, N  [facility]

## 2021-06-08 NOTE — PROGRESS NOTES
Paperwork has been signed and will be faxed to Gayatri Cannon at [phone]. Copy will be sent to HIM to be scanned into the chart.     Alissa Guevara CMA ([facility])

## 2021-06-08 NOTE — TELEPHONE ENCOUNTER
I got a message from Kristine this morning regarding some [facility] paperwork that was submitted to her employer.  There are different end dates on this.  Can someone look into this and get back to her ASAP please

## 2021-06-08 NOTE — ANESTHESIA CARE TRANSFER NOTE
Last vitals:   Vitals:    05/07/20 1048   BP: 119/72   Pulse: 84   Resp: 16   Temp: 36.8  C (98.3  F)   SpO2: 99%     Patient's level of consciousness is drowsy  Spontaneous respirations: yes  Maintains airway independently: yes  Dentition unchanged: yes  Oropharynx: oropharynx clear of all foreign objects    QCDR Measures:  ASA# 20 - Surgical Safety Checklist: WHO surgical safety checklist completed prior to induction    PQRS# 430 - Adult PONV Prevention: 4558F - Pt received => 2 anti-emetic agents (different classes) preop & intraop  ASA# 8 - Peds PONV Prevention: NA - Not pediatric patient, not GA or 2 or more risk factors NOT present  PQRS# 424 - Deniec-op Temp Management: 4559F - At least one body temp DOCUMENTED => 35.5C or 95.9F within required timeframe  PQRS# 426 - PACU Transfer Protocol: - Transfer of care checklist used  ASA# 14 - Acute Post-op Pain: ASA14B - Patient did NOT experience pain >= 7 out of 10

## 2021-06-08 NOTE — TELEPHONE ENCOUNTER
Pt called in again today and asked to have the dates on her FMLA paperwork changed. She says that her paperwork says she is returning to work on 5/20 but her STD paperwork says 5/25 so she would like those to both be 5/25. She also says that she needs a RTW form filled out and will have her employer send that to our fax number so it can be filled out. I let her know that things may not happen until next week and she was ok with that. She will call with any further questions or concerns.    Julia Rodriguez RN, CBN  [facility]  P [phone]  F [phone]

## 2021-06-08 NOTE — PROGRESS NOTES
"Kristine Scott is a 49 y.o. female who is being evaluated via a billable telephone visit.      The patient has been notified of following:     \"This telephone visit will be conducted via a call between you and your physician/provider. We have found that certain health care needs can be provided without the need for a physical exam.  This service lets us provide the care you need with a short phone conversation.  If a prescription is necessary we can send it directly to your pharmacy.  If lab work is needed we can place an order for that and you can then stop by our lab to have the test done at a later time.    Telephone visits are billed at different rates depending on your insurance coverage. During this emergency period, for some insurers they may be billed the same as an in-person visit.  Please reach out to your insurance provider with any questions.    If during the course of the call the physician/provider feels a telephone visit is not appropriate, you will not be charged for this service.\"    Patient has given verbal consent to a Telephone visit? Yes    Patient would like to receive their AVS by AVS Preference: Terrence.    Additional provider notes:     HPI: Pt is assessed via telephone follow up internal hernia repair with Dr. Connor on 5/21/2020.   she is doing well.  Pain is well controlled.  No difficulties with the surgical wound/wounds.  she is eating well and denies fever and chills.         Assessment/Plan: Doing well after surgery and should follow up as needed.    Zhane Reno CNP  [facility]         Phone call duration: 5 minutes    Zhane Reno CNP      "

## 2021-06-08 NOTE — TELEPHONE ENCOUNTER
Patient called and said that last week she had an episode of extreme pain in the middle of her abdomen under her rib cage after she ate some spicy food for dinner that dropped her to her knees in pain.  It eventually subsided after a couple of hours.  She then was woke up last night at 3am.with the same pain only worse.  It has lingered with her throughout the day so she decided to call.  Pain was rated at a 7/10 but it has recently let up a bit to a 5/10 when she took some TUMS encouraged by her .  The pain today radiated around to the side.  She has continued to drink around 16-24 oz of caffeine daily but otherwise no other insulting habits.  She is also still taking her omeprazole 40 mg two times a day quite faithfully.  Discussed with Dr. Connor and he would like her to start on some carafate four times a day and then have and EGD to evaluate for a likely ulcer.  Patient is aware of the plan and understanding of it.  Karyna Schulte RN

## 2021-06-08 NOTE — PROGRESS NOTES
Received FMLA paperwork from Kristine. It has been filled out and is ready for signature from Dr. Connor. Patient was notified that Dr. Connor will be in clinic this Thrusday.     Alissa Guevara CMA ([facility])

## 2021-06-08 NOTE — ANESTHESIA POSTPROCEDURE EVALUATION
Patient: Kristine Scott  Procedure(s):  LAPAROSCOPY AND CLOSURE OF INTERNAL HERNIA  Anesthesia type: general    Patient location: PACU  Last vitals:   Vitals Value Taken Time   /73 5/7/2020 11:15 AM   Temp 36.8  C (98.2  F) 5/7/2020 11:15 AM   Pulse 79 5/7/2020 11:21 AM   Resp 19 5/7/2020 11:17 AM   SpO2 100 % 5/7/2020 11:21 AM   Vitals shown include unvalidated device data.  Post vital signs: stable  Level of consciousness: awake and responds to simple questions  Post-anesthesia pain: pain controlled  Post-anesthesia nausea and vomiting: no  Pulmonary: unassisted, return to baseline  Cardiovascular: stable and blood pressure at baseline  Hydration: adequate  Anesthetic events: no    QCDR Measures:  ASA# 11 - Denice-op Cardiac Arrest: ASA11B - Patient did NOT experience unanticipated cardiac arrest  ASA# 12 - Denice-op Mortality Rate: ASA12B - Patient did NOT die  ASA# 13 - PACU Re-Intubation Rate: ASA13B - Patient did NOT require a new airway mgmt  ASA# 10 - Composite Anes Safety: ASA10A - No serious adverse event    Additional Notes:

## 2021-06-08 NOTE — ANESTHESIA PREPROCEDURE EVALUATION
Anesthesia Evaluation      Patient summary reviewed   History of anesthetic complications (PONV)     Airway   Mallampati: II  Neck ROM: full   Pulmonary - normal exam   (+) sleep apnea on CPAP, ,                          Cardiovascular - normal exam  Exercise tolerance: > or = 4 METS  (+) hypertension, valvular problems/murmurs, past MI, CAD, CABG/stent (2 stents in 2013), cardiomyopathy, hypercholesterolemia,        ROS comment: TTE 2016: EF 55%, mild AI     Neuro/Psych    (+) depression, anxiety/panic attacks,     Comments: Bipolar Disorder    Endo/Other    (+) obesity,      GI/Hepatic/Renal    (+) GERD,             Dental - normal exam                        Anesthesia Plan  Planned anesthetic: general endotracheal  GETA - RSI with sux - airborne precautions due to lack of COVID screen lab    Fentanyl prn  Decadron, zofran  ASA 3 - emergent   Induction: intravenous   Anesthetic plan and risks discussed with: patient  Anesthesia plan special considerations: rapid sequence induction, antiemetics,   Post-op plan: routine recovery

## 2021-06-08 NOTE — TELEPHONE ENCOUNTER
Kristine is scheduled for an EGD on 5/27/2020. I called Kristine to let her know, however she asked me to call her back in a couple of days. I will leave this on the schedule for now because of her symptoms.     Vira MENDEZ [facility], General Surgery  Surgery Scheduler  [phone] (Direct Line)  [phone] (Main Line)

## 2021-06-10 NOTE — TELEPHONE ENCOUNTER
Multiple attempts made to contact patient via the phone regarding 8 am appointment that was scheduled with this writer this AM.  Patient did not answer phone after attempts made, therefore this writer left patient a message to contact the call center regarding rescheduling appt missed.     Rounds completed on pt.  All questions addressed.  I let pt know about ski attachments for walker.  I also sent facesheet to [facility] to let them know pt will discharged today.  Bedside nurse to discuss d/c medications.  Discussed importance to attend all f/u appts and take medications as prescribed.  Verbalized understanding.     Follow up with Alan Plascencia MD   The office will call you to set up appointment date and time.  [address] [facility] [address]   [phone]    Nov25 Established Patient Visit with Pool Gutierrez MD   Monday Nov 25, 2019 11:15 AM   Arrive at check-in approximately 15 minutes before your scheduled appointment time. Bring all outside medical records and imaging, along with a list of your current medications and insurance card.  1st Floor MOB  [facility]   [address]  [place] [address]   [phone]    Dec2 Established Patient Visit with Garrison Roach MD   Monday Dec 2, 2019 2:40 PM   Arrive at check-in approximately 15 minutes before your scheduled appointment time. Bring all outside medical records and imaging, along with a list of your current medications and insurance card. [facility]   [address], [address]  [facility] [id number] [phone]    Dec5 Non-Fasting Lab   Thursday Dec 5, 2019 10:00 AM   Arrive at check-in approximately 15 minutes before your scheduled appointment time. Bring all outside medical records and imaging, along with a list of your current medications and insurance card. [facility]   [address] [facility] [address]   [phone]    Dec9 Hospital Follow Up with Sg Medina PA-C   Monday Dec 9, 2019 9:00 AM   Arrive at check-in approximately 15 minutes before your scheduled appointment time. Bring all outside medical records and imaging, along with a list of your current medications and insurance card. [facility]    [address], [address]  [place] [address]   [phone]    [id number] Non-Fasting Lab   Monday Mar 9, 2020 9:30 AM   1. You may eat, drink, and take medications before this appointment.  2. No preparation is required.  Check in and register inside the [facility], Medical Office Building, 1st floor  [facility]   [address] [place] [id number] [phone]         11/21/19 1526   Final Note   Assessment Type Final Discharge Note   Anticipated Discharge Disposition Home-Health   Hospital Follow Up  Appt(s) scheduled? Yes   Discharge plans and expectations educations in teach back method with documentation complete? Yes   Right Care Referral Info   Post Acute Recommendation Home-care     Vlad Gibson RN,   [phone]

## 2021-06-17 NOTE — TELEPHONE ENCOUNTER
2+ yrs post op lab orders placed for patient in preparation for appointment with  on 5/13/2021.    Julia Rodriguez RN, CBN  [facility]  P [phone]  F [phone]

## 2021-06-17 NOTE — PATIENT INSTRUCTIONS - HE
Patient Instructions by Sherly Hickey MD at 7/31/2019  3:30 PM     Author: Sherly Hickey MD Service: -- Author Type: Physician    Filed: 7/31/2019  4:52 PM Encounter Date: 7/31/2019 Status: Addendum    : Sherly Hickey MD (Physician)    Related Notes: Original Note by Sherly Hickey MD (Physician) filed at 7/31/2019  4:35 PM         OK to decrease your calcium to 1 tablet daily:-)          [facility]  Nutritional Guidelines  Gastric Bypass 6 Months Post Op    General Guidelines and Helpful Hints:    Eat 3 meals per day + protein supplement(s). No snacks between meals.  o Do not skip meals.  This can cause overeating at the next meal and will prevent adequate protein and nutritional intake.    Aim for 60-80 grams of protein per day.   o Always eat your protein first. This assists with optimal nutrition and helps you stay full longer.  o To achieve daily protein goals, it is recommended to drink approved protein supplement between meals.    Follow appropriate portion size: Use measuring cups to be accurate.    Months Post Op Portion Size per Meal   6 months 1/2 cup   7-8 months 1/2 - 2/3 cup   8-9 months 2/3 - 3/4 cup   10-12 months 3/4 - 1 cup   12 months and beyond 1 cup maximum       Continue to use saucer/salad plates, infant/toddler silverware to keep portion sizes small and take small bites.    Eat S-L-O-W-L-Y to make each meal last 20-30 minutes. Always stop eating when satisfied.    Continue to use caution with foods containing skins, peels or membranes. Chew well!    Aim for 64 oz. of calorie-free fluids daily.  o Continue to avoid caffeine, carbonation and alcohol.  o Remember to avoid drinking during meals, 15-30 minutes before and 30 minutes after.    Aim for 30-60 minutes of physical activity most days of the week.    If having trouble tolerating meat, try using a crock-pot, tinfoil tent, steamer or other moist cooking method to create tender meats. Add broth  or low-fat gravy to help meat stay moist.     Avoid high sugar and high fat foods to prevent dumping syndrome.  o Check nutrition labels for less than 10 grams of sugar and less than 10 grams of fat per serving.    Continue Taking Vitamins/Minerals:  o 6280-5231 mcg of Sublingual B-12 daily  o 1 Multivitamin with Iron twice daily (chewable or swallow tabs)  o 500-600 mg Calcium Citrate twice daily (chewable or swallow tabs)  o 5000 IU Vitamin D3 daily    Sample Grocery List    Protein:    Fat free Greek or light yogurt (less than 10 grams sugar)    Fat free or low-fat cottage cheese    String cheese or reduced fat cheese slices    Tuna, salmon, crab, egg, or chicken salad made with light or fat free mayonnaise    Egg or Egg Substitute    Lean/extra lean turkey, beef, bison, venison (ground, sirloin, round, flank)    Pork loin or tenderloin (grilled, baked, broiled)    Fish such as salmon, tuna, trout, tilapia, etc. (grilled, baked, broiled)    Tender cuts of lean (skinless) turkey or chicken    Lean deli meats: turkey, lean ham, chicken, lean roast beef    Beans such as kidney, garbanzo, black, elena, or low-fat/fat free refried beans    Peanut butter (natural preferred). Limit to 1 Tbsp. per day.    Low-fat meatloaf (made with lean ground beef or turkey)    Sloppy Joes made with low-sugar ketchup and lean ground beef or turkey    Soy or vegetable protein (i.e. vegan crumbles, soy/veggie burger, tofu)    Hummus    Vegetables:    Fresh: cooked or raw (as tolerated)    Frozen vegetables    Canned vegetables (low sodium or no salt added, rinse before cooking/eating)    (Ok to have skins/peels/membranes/seeds - just chew well)    Fruits:    Fresh fruit    Frozen fruit (no sugar added)    Canned fruit (packed in its own juice, NOT syrup)    (Ok to have skins/peels/membranes/seeds - just chew well)    Starch:    Unsweetened whole-grain hot cereal (or high fiber cold cereal, dry)    Toasted whole wheat bread or Hamburg  Thins    Whole grain crackers    Baked/boiled/mashed potato/sweet potato    Cooked whole grain pasta, brown rice, or other cooked whole grains    Starchy vegetables: corn, peas, winter squash      Protein Supplement:     Ready to drink protein shake with:  o 15-30 grams protein per serving  o Less than 10 grams total carbohydrate per serving     Protein powder mixed with:  o  Skim or 1% milk  o Low fat or fat free Lactaid milk, plain or no sugar added soymilk  o Water     Fats: (use in moderation)    1 teaspoon of soft tub margarine    1 teaspoon olive oil, canola oil, or peanut oil    1 tablespoon of low-fat anderson or salad dressing     Sample Menu for 6 months after Gastric Bypass    You do NOT need to eat/drink the full portion sizes listed below  Always stop when you are satisfied  Breakfast 6 Tbsp. 1% cottage cheese   2 Tbsp. peaches    Lunch 2 oz. lean hamburger or veggie burger  1-2 tsp. salsa   Supplement Approved Protein Shake   (Have between meals throughout the day)   Dinner 6 Tbsp. chicken breast  1-2 Tbsp. green beans     Breakfast 2 Eggs or   cup scrambled egg substitute product   Lunch 7 Tbsp. low-fat Sloppy Jaswinder mixture   2 high fiber crackers   Supplement Approved Protein Shake   (Have between meals throughout the day)   Dinner 6 Tbsp. grilled, broiled, or baked lemon pepper salmon  2 Tbsp. asparagus     Breakfast 6 Tbsp. light yogurt with 2 Tbsp. Grape Nuts or high fiber cereal    Lunch   cup of chili made with extra lean ground beef and kidney beans   Dinner 5 Tbsp. pork loin made in a crock pot  2 Tbsp. cooked broccoli  1 Tbsp. baked potato with 2 sprays of spray margarine    Supplement Approved Protein Shake   (Have between meals throughout the day)     Breakfast 6 Tbsp. lean ham  2 Tbsp. seedless melon   Lunch 7 Tbsp. diced turkey with 1 teaspoon low-fat gravy  1 Tbsp. green beans   Dinner 6 Tbsp. extra lean ground beef mixed with low sugar spaghetti sauce  2 Tbsp. whole wheat pasta   Supplement  Approved Protein Shake   (Have between meals throughout the day)     Breakfast 2 oz turkey or soy based sausage mamie    Lunch 6 Tbsp. low-fat cottage cheese  2 Tbsp. pineapple   Supplement Approved Protein Shake   (Have between meals throughout the day)   Dinner 7 Tbsp. beef tenderloin  1 Tbsp. asparagus     Breakfast 1/2 of a whole wheat English Muffin (toasted)  1 Tbsp. creamy natural peanut butter   Lunch   cup of black bean soup with 1 Tbsp. low fat shredded cheese   Dinner 7 Tbsp. low-fat turkey meat loaf   1 Tbsp. cooked carrots   Supplement Approved Protein Shake   (Have between meals throughout the day)     Breakfast 6 Tbsp. scrambled egg or scrambled egg substitute  1 Tbsp. finely chopped bell pepper  1 Tbsp. low fat shredded cheese   Lunch 7 Tbsp. lean diced ham  1 Tbsp. mandarin oranges   Supplement Approved Protein Shake   (Have between meals throughout the day)   Dinner 6 Tbsp. flaked fish   2 Tbsp. mashed sweet potato

## 2021-06-17 NOTE — PROGRESS NOTES
Bariatric Follow Up Visit with a History of Previous Bariatric Surgery     Date of visit: 5/13/2021  Physician: Sherly Zamora MD  Primary Care Provider:  Lilly De León DO Lynn M Dahn   50 y.o.  female    Date of Surgery: 1/29/2019  Initial Weight: 309#  Initial BMI: 44.34  Today's Weight:   Wt Readings from Last 1 Encounters:   05/13/21 (!) 249 lb (112.9 kg)     Body mass index is 35.73 kg/m .      Assessment and Plan     Assessment: Kristine is a 50 y.o. year old female who is 2 yrs 4 months s/p  Teri en Y Gastric Bypass with Dr. Zhane Scott feels as if she had achieved the goals she hoped to accomplish through bariatric surgery and weight loss.    Encounter Diagnoses   Name Primary?     Gastrojejunal ulcer with perforation (H)      Postoperative malabsorption Yes     Hyperphagia          Current Outpatient Medications:      acetaminophen (TYLENOL) 500 MG tablet, Take 2 tablets (1,000 mg total) by mouth 4 (four) times a day., Disp:  , Rfl: 0     ALPRAZolam (XANAX) 0.25 MG tablet, Take 0.25-0.5 mg by mouth 2 (two) times a day as needed for anxiety.    , Disp: , Rfl:      ARIPiprazole (ABILIFY) 5 MG tablet, Take 5 mg by mouth daily., Disp: , Rfl:      aspirin 81 MG EC tablet, Take 1 tablet (81 mg total) by mouth daily with supper., Disp: , Rfl: 0     atorvastatin (LIPITOR) 40 MG tablet, Take 1 tablet (40 mg total) by mouth at bedtime., Disp: , Rfl: 0     buPROPion (WELLBUTRIN XL) 150 MG 24 hr tablet, Take 1 tablet by mouth daily., Disp: , Rfl:      calcium citrate-vitamin D (CITRACAL+D) 315-200 mg-unit per tablet, Take 2 tablets by mouth daily., Disp: , Rfl: 0     cholecalciferol, vitamin D3, 2,000 unit Tab, Take 1 tablet (2,000 Units total) by mouth daily., Disp: , Rfl: 0     cyanocobalamin, vitamin B-12, 1,000 mcg Subl, Place 1,000 mcg under the tongue daily., Disp: , Rfl:      DULoxetine (CYMBALTA) 60 MG capsule, Take 120 mg by mouth daily.    , Disp: , Rfl:      EPINEPHrine (EPIPEN 2-YANA) 0.3 mg/0.3 mL  injection, Inject 0.3 mg into the shoulder, thigh, or buttocks as needed for anaphylaxis.    , Disp: , Rfl:      lamoTRIgine (LAMICTAL) 200 MG tablet, Take 200 mg by mouth 2 (two) times a day.    , Disp: , Rfl:      levonorgestrel (MIRENA) 20 mcg/24 hr (5 years) IUD, 1 Device by Intrauterine route., Disp: , Rfl:      meclizine (ANTIVERT) 25 mg tablet, Take 25 mg by mouth 3 (three) times a day as needed., Disp: , Rfl:      miscellaneous medical supply ([employer]) Misc, CPAP for home use at pressure of 9  Heated humidifier x1, Humidifier chamber x1, Heated tubing x1, Full face mask with cushion x1, Headgear x1, Filters: Disposable x1 pack, Reusable x1pk, Length of Need: 99 months, Frequency of use: Daily, Disp: , Rfl:      nitroglycerin (NITROSTAT) 0.4 MG SL tablet, Place 0.4 mg under the tongue every 5 (five) minutes as needed for chest pain.    , Disp: , Rfl:      omega 3-dha-epa-fish oil 183.3 mg-75 mg -91.6 mg-306 mg cap, Take 1 capsule by mouth daily., Disp: , Rfl: 0     omeprazole (PRILOSEC) 40 MG capsule, Take 1 capsule (40 mg total) by mouth 2 (two) times a day., Disp: 120 capsule, Rfl: prn     pediatric multivitamin (FLINTSTONES) Chew chewable tablet, Chew 1 tablet 2 (two) times a day., Disp: , Rfl: 0     tablet cutter Misc, CPAP, heated humidifier, mask, headgear, filters and heated tubing. For home use. Pressure: 10   Length of Need: 99, Disp: , Rfl:      traZODone (DESYREL) 50 MG tablet, Take 1 tablet by mouth at bedtime as needed. May repeat x 2 if needed, Disp: , Rfl:      VITAMIN B COMPLEX ORAL, Take 1 tablet by mouth daily., Disp: , Rfl:      phentermine 8 mg Tab, Take 4-8 mg by mouth 2 (two) times a day., Disp: 90 each, Rfl: 0    Plan:Consider 24 week program, continue vitamins as is. phentermne 4-8mg 1-2X/day for appetite suppression. 2 week liquid diet and dietitian. Encouraged to push water while on liquid diet d/t propensity toward dehydration. Will put in a standing order. Kristine  "will increase hydration and continue abstaining from alcohol and recheck in 1,2,or 3 months.     Return for Next scheduled follow up.    Bariatric Surgery Review     Interim History/LifeChanges: COVID Stress. Taking vitamins with consistency. Has gained 36#. Had been drinking more but has stopped    Patient Concerns: weight gain, pouch reset   Appetite (1-10):   GERD: on PPI    Medication changes: abilify    Vitamin Intake:   B-12   SL   MVI  2/s   Vitamin D  5,000   Calcium   citrate     Other                LABS: \"Reviewed  Excellent. LFTs up a little and have been for a year. On lipitor will follow.   Nausea no  Vomiting no  Constipation no  Diarrhea no  Rashes no  Hair Loss no  Calf tenderness no  Breathing difficulty no  Reactive Hypoglycemia no  Light Headedness yes   Moods stress. Much more stable and back to work FT    12 point ROS as above and otherwise negative      Habits:  Alcohol: reduced and none  Tobacco: no  Caffeine coffee  NSAIDS daily  Exercise Routine: hiking more, biking. Plans to continue biking  3 meals/day grazing  Protein first yes but not always  60 grams/day  Water Separate from meals no  Calorie Containing Beverages Soledad Mist  Restaurant eating/wk 5  Sleeping 8-12  Stress high  CPAP: NA  Contraception: PM  DEXA:not indicated d/t low risk    Social History     Social History     Socioeconomic History     Marital status:      Spouse name: Not on file     Number of children: Not on file     Years of education: Not on file     Highest education level: Not on file   Occupational History     Not on file   Social Needs     Financial resource strain: Not on file     Food insecurity     Worry: Not on file     Inability: Not on file     Transportation needs     Medical: Not on file     Non-medical: Not on file   Tobacco Use     Smoking status: Never Smoker     Smokeless tobacco: Never Used   Substance and Sexual Activity     Alcohol use: No     Frequency: Never     Drug use: No     Sexual " activity: Yes     Partners: Male     Birth control/protection: I.U.D.   Lifestyle     Physical activity     Days per week: Not on file     Minutes per session: Not on file     Stress: Not on file   Relationships     Social connections     Talks on phone: Not on file     Gets together: Not on file     Attends Yarsanism service: Not on file     Active member of club or organization: Not on file     Attends meetings of clubs or organizations: Not on file     Relationship status: Not on file     Intimate partner violence     Fear of current or ex partner: Not on file     Emotionally abused: Not on file     Physically abused: Not on file     Forced sexual activity: Not on file   Other Topics Concern     Not on file   Social History Narrative    , 2 children,     Working FT [employer] [place]       Past Medical History     Past Medical History:   Diagnosis Date     Anxiety      Bipolar disorder (H)      Cardiomyopathy (H)      Carpal tunnel syndrome      Coronary artery disease 04/01/2013    2 stents     Coronary atherosclerosis      Depression      Heart attack (H)      Heartburn      History of anesthesia complications      Hyperlipidemia      Hypertension      Migraine      Morbid obesity with BMI of 40.0-44.9, adult (H)      Myocardial infarction (H) 20013     Obsessive compulsive disorder      Plantar fasciitis      PONV (postoperative nausea and vomiting)      Sleep apnea     cpap     Stress incontinence      Problem List     Patient Active Problem List   Diagnosis     Bipolar disorder (H)     Heartburn     Sleep apnea     Urinary incontinence     Coronary artery disease     Hypertension     Myocardial infarction (H)     Migraine     Knee pain     Plantar fasciitis     Morbid obesity with BMI of 40.0-44.9, adult (H)     Morbid obesity (H)     Morbid obesity due to excess calories (H)     Acute post-operative pain     Intra-abdominal free air of unknown etiology     Free intraperitoneal air     Gastrojejunal  ulcer with perforation (H)     Colon cancer screening     Esophageal reflux     Female stress incontinence     Gastrointestinal problem     Obsessive-compulsive disorder     Mixed hyperlipidemia     Moderate episode of recurrent major depressive disorder (H)     Stented coronary artery     Small bowel obstruction (H)     SBO (small bowel obstruction) (H)     Internal hernia     Marginal ulcer     Medications     Current Outpatient Medications   Medication Sig     acetaminophen (TYLENOL) 500 MG tablet Take 2 tablets (1,000 mg total) by mouth 4 (four) times a day.     ALPRAZolam (XANAX) 0.25 MG tablet Take 0.25-0.5 mg by mouth 2 (two) times a day as needed for anxiety.            ARIPiprazole (ABILIFY) 5 MG tablet Take 5 mg by mouth daily.     aspirin 81 MG EC tablet Take 1 tablet (81 mg total) by mouth daily with supper.     atorvastatin (LIPITOR) 40 MG tablet Take 1 tablet (40 mg total) by mouth at bedtime.     buPROPion (WELLBUTRIN XL) 150 MG 24 hr tablet Take 1 tablet by mouth daily.     calcium citrate-vitamin D (CITRACAL+D) 315-200 mg-unit per tablet Take 2 tablets by mouth daily.     cholecalciferol, vitamin D3, 2,000 unit Tab Take 1 tablet (2,000 Units total) by mouth daily.     cyanocobalamin, vitamin B-12, 1,000 mcg Subl Place 1,000 mcg under the tongue daily.     DULoxetine (CYMBALTA) 60 MG capsule Take 120 mg by mouth daily.            EPINEPHrine (EPIPEN 2-YANA) 0.3 mg/0.3 mL injection Inject 0.3 mg into the shoulder, thigh, or buttocks as needed for anaphylaxis.            lamoTRIgine (LAMICTAL) 200 MG tablet Take 200 mg by mouth 2 (two) times a day.            levonorgestrel (MIRENA) 20 mcg/24 hr (5 years) IUD 1 Device by Intrauterine route.     meclizine (ANTIVERT) 25 mg tablet Take 25 mg by mouth 3 (three) times a day as needed.     miscellaneous medical supply ([employer]) [facility] [place]AP for home use at pressure of 9  Heated humidifier x1, Humidifier chamber x1, Heated tubing x1, Full  "face mask with cushion x1, Headgear x1, Filters: Disposable x1 pack, Reusable x1pk, Length of Need: 99 months, Frequency of use: Daily     nitroglycerin (NITROSTAT) 0.4 MG SL tablet Place 0.4 mg under the tongue every 5 (five) minutes as needed for chest pain.            omega 3-dha-epa-fish oil 183.3 mg-75 mg -91.6 mg-306 mg cap Take 1 capsule by mouth daily.     omeprazole (PRILOSEC) 40 MG capsule Take 1 capsule (40 mg total) by mouth 2 (two) times a day.     pediatric multivitamin (FLINTSTONES) Chew chewable tablet Chew 1 tablet 2 (two) times a day.     tablet cutter Misc CPAP, heated humidifier, mask, headgear, filters and heated tubing. For home use. Pressure: 10   Length of Need: 99     traZODone (DESYREL) 50 MG tablet Take 1 tablet by mouth at bedtime as needed. May repeat x 2 if needed     VITAMIN B COMPLEX ORAL Take 1 tablet by mouth daily.     phentermine 8 mg Tab Take 4-8 mg by mouth 2 (two) times a day.     Surgical History     Past Surgical History  She has a past surgical history that includes [place] tooth extraction; little toe surgery; Tubal ligation (2004); Cholecystectomy (2010); Reduction mammaplasty (1998); Breast biopsy (2012); Coronary angioplasty with stent (2013); Cystocele repair; Cardiac surgery; Hysteroscopy; pr lap gastric bypass/wandy-en-y (N/A, 1/29/2019); pr lap,diagnostic abdomen (N/A, 5/21/2019); pr lap,diagnostic abdomen (N/A, 5/7/2020); Gastrectomy; Vascular surgery; and Abdominal surgery.    Objective-Exam     Constitutional:  /70   Ht 5' 10\" (1.778 m)   Wt (!) 249 lb (112.9 kg)   Breastfeeding No   BMI 35.73 kg/m    Height: 5' 10\" (1.778 m) (5/13/2021  9:33 AM)  Initial Weight: 249 lbs (5/13/2021  9:33 AM)  Weight: (!) 249 lb (112.9 kg) (5/13/2021  9:33 AM)  Weight loss from initial: 0 (5/13/2021  9:33 AM)  % Weight loss: 0 % (5/13/2021  9:33 AM)  BMI (Calculated): 35.7 (5/13/2021  9:33 AM)    General:  Pleasant and in no acute distress   Eyes:  EOMI  ENT:  Airway 2+  " Moist mucous membranes  Neck:  Supple, No LAD, No thyromegaly, No carotid bruits appreciated  Respiratory: Normal respiratory effort, no cough, wheezes or crackles  CV:  Regular rate and Rhythm,no murmurs, pulses palpable distal, no calf tenderness, no LE edema  Gastrointestinal: Abdomen NT/ND, BS+  Musculoskeletal: muscle mass WNL  Skin: color pink hair full, incisions nicely healed  Neurological: No tremor, normal gait  Psychiatric: alert and oriented X3, mood and affect normal    Counseling     We reviewed the important post op bariatric recommendations:  -eating 3 meals daily  -eating protein first, getting >60gm protein daily  -eating slowly, chewing food well  -avoiding/limiting calorie containing beverages  -drinking water 15-30 minutes before or after meals  -choosing wheat, not white with breads, crackers, pastas, noemy, bagels, tortillas, rice  -limiting restaurant or cafeteria eating to twice a week or less    We discussed the importance of restorative sleep and stress management in maintaining a healthy weight.  We discussed the National Weight Control Registry healthy weight maintenance strategies and ways to optimize metabolism.  We discussed the importance of physical activity including cardiovascular and strength training in maintaining a healthier weight.    We discussed the importance of life-long vitamin supplementation and life-long  follow-up.    Kristine was reminded that, to avoid marginal ulcers she should avoid tobacco at all, alcohol in excess, caffeine in excess, and NSAIDS (unless indicated for cardioprotection or othewise and opposed by a PPI).    Sherly Zamora MD, FAAFP  [facility].  5/13/2021  9:54 AM      No images are attached to the encounter.  Total time spent on the date of this encounter doing: chart review, review of test results, patient visit, physical exam, education, counseling, developing plan of care, and documenting = 30 minutes.

## 2021-06-17 NOTE — PATIENT INSTRUCTIONS - HE
2 WEEKS  PRE SURGERY    FULL LIQUID DIET MATERIALS    SURGERY DATE:    DIET START DATE:  CLEAR LIQUIDS ON:                      2 Week Pre-Surgery Full Liquid Diet    Congratulations! Your surgery is scheduled and soon you will be starting your 2-week liquid diet.  This is a calorie controlled, high protein, and low carbohydrate meal plan.  The goal in the next 2 weeks is rapid weight loss! On average women should expect to lose ~10-12 lbs and men 12-16 lbs over the next 2 weeks.    Diet Reason:  This diet is to help reduce the size of your liver to make the procedure easier for the surgeons and reduce your risk of complications.    Hunger: The first 3-4 days on this diet are the most difficult--try to keep busy during these first days! During this time your body is using up the remaining carbohydrates stores in your body for fuel.  After the first few days on full liquids, your body will be primarily burning fat for energy and you will likely experience minimal physical hunger.    Protein:  Your goal is to drink 1 protein shake at each of your 3 meals.  Not eating enough protein or calories as recommended can increase dizziness, fatigue and hunger.    ? Ready to Drink Protein Shakes:  These products are commercially prepared and are ready to drink right off the shelf.  o Advantage: No mixing, blending, or measuring. There are a variety of flavors to choose from.  o Disadvantage: They generally cost about $5.00-$6.00 per case of 4 drinks    ? Protein Powders: These products can be used to make protein supplements. Protein powders can be mixed with skim milk (limit to 16 ounces or less per day) or water and Crystal Light type beverages.  o Advantage: lower in cost per serving over Ready to Drink Protein Shakes  o Disadvantage: Buying in bulk limits the variety of flavors.    Fluid Goals:  In addition to full liquid diet, aim to drink 48-64 ounces of water or other calorie-free liquids per day!  ? There is no need to  separate fluids from meals by 30 minutes because meals are all liquids.      Product Type Serving Calories Protein Grams Sugar Grams Where Available   Premier Protein Shake 1 can 160 30 1 [place]/[employer]   AdvantEdge Carb (EAS) Shake 1 can 110 17 0 [facility], [employer], Grocery/Pharmacy   Pure Protein Shake 1 can 110 20 1 Target,  Joes   Slim Fast   High Protein Shake 1 can 190 20 1 [facility], Target, Grocery/Pharmacy   Atkins Advantage Shake 1 can 160 15-17 1 [facility], Target, Grocery/Pharmacy   Isopure Zero Carb Juice   bottle 80 20 0 GN, vitamin shoppe, online   Muscle Milk Light Shake 1 can 110 15 0 [facility], [facility], [facility], Grocery/Pharmacy    Whey Shake 1 bottle 100 18 2 www.designerwhey.com   Unjury Protein Powder 1 scoop 90 20 0 [email or web address]  [email or web address]  0-431-680-2348   Kawn Nutrition (gluten-free) Powder 1 scoop 180 27 2 [email or web address]   [place] Protein Powder  (fruit-flavors) 1 scoop or packet 90 23 0 www.dietdirect.com  www.bariatriceating.com  [employer], Vitamin Shoppe    Whey Powder 1 scoop 100 18 2 Target, [facility], [facility], wwwCloudary   Myoplex Lite Powder 1 scoop 180 25 1 [email or web address]  GN, [employer]  [facility]   Josemanuel Schuyler Whey Protein Powder Powder 1 scoop 110 25 0 [facility], Vitamin Shoppe, etc       2 Week Grocery List and Portion Guide    Group A      cup Cream of Wheat or Cream of Rice (cooked)       cup Plain Malt-O-Meal (cooked)       cup Plain Grits (cooked)   *May add Splenda, spray margarine and/or spices like cinnamon and nutmeg to add flavor  *If you dislike hot cereals, you may replace Group A with   cup No Sugar Added Applesauce OR 1 Group F.    Group B    Protein Shake: 100-190 calories, 15-30 g Protein, less than 5-10 g carbohydrate  (see chart for examples)     Powdered Protein: 100-125 calories, 15-30 g Protein, less than 5-10 grams carbohydrate  (see chart for examples)     Group C    1 Cup Serving: Tomato Soup (made with skim milk or water)     1  Cup Serving: Low Fat/Light Cream Soup  Such as: Cream of Chicken, Cream of Broccoli, Cream of Celery, Cream of Mushroom     1 Cup Serving: Meat and Vegetable Soup  (Avoid soups with potato, pasta, rice, corn, beans, dumplings and barley as main ingredient)   *Soups should contain less than 15grams of carbohydrates per 1 cup serving.  Group D      cup ( 4 oz) of Sugar Free Pudding       cup ( 4 oz) of Sugar Free Jell-O or 1 Sugar Free Popsicle       cup ( 4 oz) of No Sugar Added Applesauce     Group E- (optional)      cup ( 4 oz)  of 100% Fruit Juice       cup ( 6 oz) of Diet V8 Splash or Light V8 Fusion     1 cup ( 8 oz) of Tomato Juice, Low Sodium or Regular V8 Tomato Juice   *If choosing not to drink juice, may replace Group E with   cup No Sugar Added Applesauce.     Group F    6 oz cup: Light 100 Calorie Yogurt or Nonfat Plain Greek Yogurt  Flavors without Fruit Chunks: Vanilla, Lemon, Banana, [place] Pie, [place] Cream Pie     *If mixing protein shake or making soup with milk:     Skim/1% Milk or Plain Soy Milk--limit to 16 oz. or less per day   MEAL PLAN:    2 WEEK PRE-SURGIAL BARIATRIC FULL LIQUID DIET    Breakfast:    1 Group A  1 Group B  1 Group E      Lunch:    1 Group B  1 Group C  1 Group D      Dinner:    1 Group B  1 Group C  1 Group D      Evening Snack:  1 Group F      *Remember to drink water or other calorie-free beverages and aim for 48-64 ounces of fluid per day in addition to full or clear liquid diet     Calorie Goals:   ~ 900-1000 calories per day   Protein Goals:   ~ 60-80 grams of protein per day    DAY BEFORE SURGERY: CLEAR LIQUID DIET  Date:    Sip on these liquids all day long the day before surgery to optimize hydration!  Water   0-Calorie Propel or Vitamin Water Zero   Diluted fruit juice (50% juice/50% water)   Crystal Light, Sugar Free Kiet Aid, SoBe Life Water or other calorie free beverages   Regular or No Salt Added Chicken, Vegetable, or Beef Broth   Sugar-Free Jell-O, Sugar-Free  Popsicle (Avoid Deep Red and Purple Pigments)     NO PROTEIN SUPPLEMENTS!!    SAMPLE MEAL PLAN:  DAY BEFORE SURGERY CLEAR LIQUID DIET  Breakfast  1 cup of chicken, vegetable or beef broth  8 ounces 100% fruit juice diluted (50% juice/50% water)    Lunch  1 cup of chicken, vegetable or beef broth    cup of sugar free Jell-O  Sugar-free Popsicle    Dinner  1 cup of chicken, vegetable or beef broth    cup of sugar free Jell-O or 1 sugar-free Popsicle    Evening Snack    cup of sugar free Jell-O or 1 sugar free Popsicle    [facility]  Nutritional Guidelines  Gastric Bypass 18 Months Post Op and Beyond    General Guidelines and Helpful Hints:    Eat 3 meals per day + protein supplement(s). No snacks between meals.  o Do not skip meals.  This can cause overeating at the next meal and will prevent adequate protein and nutritional intake.    Aim for 60-80 grams of protein per day.  o Always eat your protein first. This assists with optimal nutrition and helps you stay full longer.  o Depending on your portion size, you may need to drink approved protein supplement between meals to achieve protein goals. Follow recommendations of your Dietitian.     Eat your protein first, and then follow with fiber.   o It is not necessary to count your fiber, but 15-20 grams per day is recommended.    o Add fiber by including fruits, vegetables, whole grains, and beans.     Portions should remain about 1 cup per meal. Use measuring cups to be accurate.    Continue to use saucer/salad plates, infant/toddler silverware to keep portion sizes small and take small bites.    Eat S-L-O-W-L-Y to make each meal last 20-30 minutes. Always stop eating when satisfied.    Continue to use caution with foods containing skins, peels or membranes. Chew well!    Aim for 64 oz. of calorie-free fluids daily.  o Continue to avoid caffeine and carbonation. If you choose to drink alcohol, do so in moderation.   o Remember to avoid drinking during  meals, 15-30 minutes before and 30 minutes after.    Exercise is wu for continued weight loss and weight maintenance. Aim for 30-60 minutes of physical activity most days of the week. Include cardiovascular and strength training.    If having trouble tolerating meat, try using a crock-pot, tinfoil tent, steamer or other moist cooking method to create tender meats. Add broth or low-fat gravy to help meat stay moist.     Avoid high sugar and high fat foods to prevent dumping syndrome.  o Check nutrition labels for less than 10 grams of sugar and less than 10 grams of fat per serving.    Continue Taking Vitamins/Minerals:  o 0763-6860 mcg of Sublingual B-12 daily  o 1 Multivitamin with Iron twice daily (chewable or swallow tabs)  o 500-600 mg Calcium Citrate twice daily (chewable or swallow tabs)  o 5000 IU Vitamin D3 daily    Sample Grocery List    Protein:    Fat free Greek or light yogurt (less than 10 grams sugar)    Fat free or low-fat cottage cheese    String cheese or reduced fat cheese slices    Tuna, salmon, crab, egg, or chicken salad made with light or fat free mayonnaise    Egg or Egg Substitute    Lean/extra lean turkey, beef, bison, venison (ground, sirloin, round, flank)    Pork loin or tenderloin (grilled, baked, broiled)    Fish such as salmon, tuna, trout, tilapia, etc. (grilled, baked, broiled)    Tender cuts of lean (skinless) turkey or chicken    Lean deli meats: turkey, lean ham, chicken, lean roast beef    Beans such as kidney, garbanzo, black, elena, or low-fat/fat free refried beans    Peanut butter (natural preferred). Limit to 1 Tbsp. per day.    Low-fat meatloaf (made with lean ground beef or turkey)    Sloppy Joes made with low-sugar ketchup and lean ground beef or turkey    Soy or vegetable protein (i.e. vegan crumbles, soy/veggie burger, tofu)    Hummus    Vegetables:    Fresh: cooked or raw (as tolerated)    Frozen vegetables    Canned vegetables (low sodium or no salt added, rinse  before cooking/eating)    (Ok to have skins/peels/membranes/seeds - just chew well)    Fruits:    Fresh fruit    Frozen fruit (no sugar added)    Canned fruit (packed in its own juice, NOT syrup)    (Ok to have skins/peels/membranes/seeds - just chew well)    Starch:    Unsweetened whole-grain hot cereal (or high fiber cold cereal, dry)    Toasted whole wheat bread or [place] Thins    Whole grain crackers    Baked /boiled/mashed potato/sweet potato    Cooked whole grain pasta, brown rice, or other cooked whole grains    Starchy vegetables: corn, peas, winter squash    Protein Supplement:     Ready to drink protein shake with:  o 15-30 grams protein per serving  o Less than 10 grams total carbohydrate per serving     Protein powder mixed with:  o  Skim or 1% milk  o Low fat or fat free Lactaid milk, plain or no sugar added soymilk  o Water     Fats: (use in moderation)    1 teaspoon of soft tub margarine    1 teaspoon olive oil, canola oil, or peanut oil    1 tablespoon of low-fat anderson or salad dressing     Sample Menu for 18+ months after Gastric Bypass    You do NOT need to eat/drink the full portion sizes listed below  Always stop when you are satisfied    Breakfast   cup 1% cottage cheese     cup mixed berries   Lunch 2 oz lean roast beef on   [place] Thin with 1 tsp. light anderson    small tomato, chopped, mixed with 1 tsp. light vinaigrette dressing   Supplement Approved protein supplement (if needed between meals)   Dinner 2 oz grilled salmon    cup salad greens with 1 tsp. light salad dressing and 1 tsp. ground flax seed    cup quinoa or brown rice     Breakfast   cup egg substitute with   cup sautéed chopped vegetables  2 light [place] Krisp crackers   Lunch Tuna Melt:   cup tuna mixed with 1 tsp. light anderson over   [place] Thin. Top with 2-3 slices cucumber and 1 oz slice of low fat cheese   Supplement 1 cup skim milk (if needed between meals)   Dinner 3 oz  grilled, broiled, or baked seasoned skinless chicken  breast    cup asparagus     Breakfast   cup plain oatmeal made with skim or 1% milk with 1 Tbsp. flavored/unflavored protein powder added  1 mozzarella string cheese   Lunch 2 oz deli turkey breast  1/3 cup salad with 1 tsp. light salad dressing, 1/8 of a whole avocado and 1 Tbsp. sunflower seeds   Dinner 3 oz. pork loin made in a crock pot, seasoned with a spice rub    cup cooked carrots   Supplement Approved protein supplement (if needed between meals)     Breakfast 1 cup breakfast casserole made with egg substitute, turkey sausage,  and steamed, chopped bell peppers   Supplement  1 cup light Greek yogurt (if needed between meals)   Lunch 2 oz. teriyaki turkey    cup mashed sweet potato with 1-2 spritzes of spray butter (like [place])    cup fresh pineapple   Dinner 3 oz low fat meatloaf    cup roasted garlic zucchini     Breakfast   cup leftover breakfast casserole    cup no sugar added applesauce with 1 Tbsp. unflavored protein powder and a sprinkle of cinnamon    Lunch 3 oz shrimp with 1-2 Tbsp. low-sugar cocktail sauce for dipping    c. whole wheat pasta drizzled with   tsp. olive oil   Supplement 1 cup skim/1% milk with scoop of protein powder (if needed between meals)   Dinner Grilled, seasoned kebob with 2 oz lean beef and   cup vegetables     Breakfast Breakfast pizza:   [place] Thin spread with 1 Tbsp. low sugar spaghetti sauce,   cup shredded low fat cheese, melted and 1 slice of [place] rooney     cup fresh fruit mixed with chopped almonds   Lunch   cup black bean soup  4-5 whole grain crackers   Dinner 3 oz  tilapia with lemon pepper seasoning    cup stewed tomatoes   Supplement 1 string cheese (if needed between meals)     Breakfast 2 hard boiled eggs (discard 1 egg yolk)    whole wheat English Muffin with 1 tsp. low sugar jelly   Lunch   cup leftover black bean soup topped with 1-2 Tbsp. low fat cheese  2-3 light [place] Krisp crackers   Supplement Approved protein supplement (if needed between meals)    Dinner 3 oz sirloin steak    cup steamed broccoli

## 2021-06-18 NOTE — PROGRESS NOTES
I met with Kristine while she was in clinic for her initial bariatric consult with Dr. Zamora.  She has BCBS for her insurance and will need to have 6 months of SWL visits.  She will need to have a favorable [facility] eval stating she is a good candidate for surgery and complete the needs list provided to her at her initial consult before scheduling a surgical consult.  She plans to attend the support group meeting next month

## 2021-06-19 NOTE — PROGRESS NOTES
Initial Structured Weight Loss Supervised Diet Evaluation     Assessment:  Pt. is a 48 y.o. female is being seen today for initial RD nutritional evaluation. Pt. has been unsuccessful with non-surgical weight loss methods and is interested in bariatric surgery. Today we reviewed current eating habits and level of physical activity, and instructed on the changes that are required for successful bariatric outcomes.    Pt desires bariatric surgery to get rid of using C-pap, however, pt is on-the-fence of whether to pursue surgery. Pt works for the [employer] and currently is doing a no-soda challenge with her daughter.     Workflow review: psych scheduled, has not attended support group, labs complete.   Weight goal: Psych initiated, has not attended support group, and labs complete.      Pt Active Problem List Diagnosis:     Patient Active Problem List   Diagnosis     Bipolar disorder (H)     Heartburn     Sleep apnea     Urinary incontinence     Coronary artery disease     Hypertension     Myocardial infarction     Migraine     Knee pain     Plantar fasciitis     Morbid obesity with BMI of 40.0-44.9, adult (H)       Pt's Initial Weight: 309 lbs  Weight: 317 lb (143.8 kg)  Weight loss from initial: -8  % Weight loss: -2.59 %  BMI: Body mass index is 45.48 kg/(m^2).    Estimated RMR ([place]-St Jeor equation): 2147 calories    Food allergies, intolerances, and Jainism customs:  *Pt does not like artificial sugars.     Vitamins - currently takes MVI, B complex, fish oil.   Educated on post-op vitamin regimen: Multi Vit + iron 2x/day, calcium citrate 500-600 mg 2x/day, 3351-3984 mcg of Sublingual B-12 daily, and 5000 IU Vitamin D3 daily.    Biggest struggle with weight loss: sweet tooth, sedentary lifestyle.     Diet/Weight History  Method or Diet: weight watchers 15 years ago   # loss(-) or gain(+):70 lbs     Who does the grocery shopping for your household? Pt and spouse   Who prepares your meals at home? Pt and spouse      Diet Recall/Time: Pt wakes up 6:30am  Breakfast: McDonalds breakfast   Am Snack: none   Lunch: skyway outs- sandwich, chips, cookie, soda   Pm snack:driving home- fast food burger, fries, and soda  Dinner: none   HS Snack: pint of ice cream     Fats used at home: butter, olive oil.     Fried Foods: 4-6 times per week    Meals per week away from home: 10-20x/week   Sit down: none   Fast Food: daily for breakfast and dinner    Take Out: none   Delivery: none   Buffet: none   Cafeteria: various restaurants at work for lunch   Specialty Coffee: caffinated sugared beverages frequently   Ice Cream/FrozenYogurt/Bakery: ice cream   Comments: none     Recommended limiting eating out to no more than 2x/week.  Patient and I reviewed the importance of eating three consistent meals per day; as well as meal timing to be spaced 4-5 hours apart.  Snack choices: 100-150 calories (1-2x/day if physically hungry), incorporating a fruit/vegetable w/ protein source.    Portion Sizes problematic? Yes per patient/diet recall  Encouraged slowing meal times down, 20-30 minutes, chewing to applesauce consistency.   To aid in proper portion control and slow meal time down discussed consuming meals off smaller plates, use toddler/children utensils and set utensils down after each bite.    Protein, vegetables/fruits, carbohydrates:   Reviewed lean protein sources today. Recommended consuming 15-20gm protein at 3 meals daily    Beverages (Type/Oz. per day)  Water: 48-64 oz   Coffee: 2 cups   Tea: none   Milk: none   Regular soda: 64 oz soda  Diet soda: none   Juice: none   Kiet-Aid/lemonade/etc: none   Alcohol: 1 drink/month     Discussed the importance of adequate hydration after surgery and the goal of 64+ oz. of fluid daily.   The patient understands the importance of avoiding all carbonated, caffeinated and sweetened drinks; instead choosing 64 oz. plain water.    Fluid-meal separation:  The patient and I reviewed the anatomy of the  bypass and why  fluids from a meal is so important.    Exercise  Nothing routine established.     Pt. s understands that 30-60 minutes of daily activity is an important part of bariatric surgery success.   Encouraged pt. to incorporate upper body strength training exercise, even if its lifting soup cans while watching TV at night, doing pushups/sit-ups, and abdominal work.    PES statement:   1. (NI-1.3)Excessive energy intake related to Food and nutrition related knowledge deficit concerning excessive energy/oral intake as evidenced by Intake of high caloric density foods at meals and/or snacks; large portions; Estimated intake that exceeds estimated daily energy intake; Frequent excessive fast food or restaurant intake; and BMI 45.      2. (NC-3.3.5) Obese, class III, BMI ?40 related to physical inactivity as evidenced by Infrequent, low-duration and or low intensity physical activity; and Large amounts of sedentary activities, no structured exercise regimen.     Intervention  Discussion:    1. Educated pt on the [place] to Bariatric Success handout.  2. Reviewed lean protein sources today. Recommended consuming 15-20gm protein at 3 meals daily  3. Discussed various protein supplements to use after bariatric surgery.   4. Educated on pre/post-op diet progression, post-op vitamin regimen, gave review of surgery process.  5. Bariatric Plate: The patient and I discussed the importance of including lean/low  fat protein at each meal and limiting carbohydrate intake to less  than 25% of plate volume.--Did not discuss fat intake.     Instructions/Goals:     1. Include 15-20gm lean protein at each meal.  2. Increase vegetable/fruit intake, by having a vegetable or fruit with each meal daily. Recommended pt to increase vegetable/fruit intake to 4-5 servings daily.  3. Increase fluid intake to 64oz daily: choose plain or calorie/carbonation-free beverages.  4. Incorporate daily structured activity, 30-60 minutes  most days of the week  5. Practice plate method: 1/2 plate lean/low fat protein source, vegetable/fruit, <25% of plate complex carbohydrates.  6. Read food labels more consistently: keeping total fat grams <10, total sugar grams <10, fiber >3gm per serving.  7. Separate fluids 30 minutes before/after meal times.  8. Practice eating off of smaller plates/bowls, chewing to applesauce consistency, taking 20-30 minutes to eat in a calm/relaxed environment without distractions of tv/email/cell phone.    Handouts Provided:  Pt. [facility] Bariatric [facility] Patient Handbook  Protein supplement list   Bariatric Plate      Monitor/Evaluation:  Pt. s target weight: no gain from initial visit, pt. verbalized understanding.     Has realistic expectations for weight loss: Yes  Verbalizes understanding of dietary changes post procedure: No  Verbalizes understanding of supplement needs: No  Verbalizes willingness to participate in physical activity: Yes  Motivation for change: average  Client s predicted compliance on a scale of 1 (low) to 10 (high): 7  RD s prediction for client success and compliance on a scale of 1 (low) to 10 (high):  7    Plan for next visit:   Bariatric plate- educate on fat. Give food journal homework.  Educate on dumping syndrome and reading food labels.  (Final Supervised Diet visit with RD) pre/post-op  diet progression, give review of surgery process.  Review [place] to Bariatric Success  Check Understanding Quiz    Time In: 11:15am  Time Out: 12:00pm    ABN signed: Yes

## 2021-06-19 NOTE — PROGRESS NOTES
Patient has a bipolar I disorder and is on mood stabilizing medications.  She reports feeling very stable with her medications.  She will take MMPI, QOLI and MAST prior to our next appt.

## 2021-06-19 NOTE — PROGRESS NOTES
Patient's MMPI, QOLI and MAST were all normal.  It shows her Bipolar disorder to be very well controlled with Medications.  She still is eating sweets, and is only consuming 32 oz of water daily.  She needs to improve these.  She will see me next week for about a 15 minute appt.

## 2021-06-20 NOTE — PROGRESS NOTES
Patient's MMPI showed her Bipolar disorder is very well controlled with medications.  She is also doing well with her eating behaviors.

## 2021-06-20 NOTE — PROGRESS NOTES
"Follow Up Surgical Weight Loss Supervised Diet Evaluation  Assessment:  Pt presents for follow up supervised diet visit with RD.    This patient is a 48 y.o.  She is being seen today for follow-up nutritional evaluation. Kristine Scott has been unsuccessful with non-surgical weight loss methods and is interested in bariatric surgery. Today we reviewed the patients current eating habits and level of physical activity, and instructed on the changes that are required for successful bariatric outcomes.    Workflow review: labs complete, psych cleared, has not attended support group.   Weight goal: at or below initial      Pt Active Problem List Diagnosis:     Patient Active Problem List   Diagnosis     Bipolar disorder (H)     Heartburn     Sleep apnea     Urinary incontinence     Coronary artery disease     Hypertension     Myocardial infarction     Migraine     Knee pain     Plantar fasciitis     Morbid obesity with BMI of 40.0-44.9, adult (H)     Pt's Initial Weight: 309 lbs  Weight: 305 lb (138.3 kg)  Weight loss from initial: 4  % Weight loss: 1.29 %  Body mass index is 43.76 kg/(m^2).    Calculated RMR ([place]-St Jeor equation): 2147 calories    Progress made since last visit: Pt disappointed in herself for having \"slip ups\" such as eating out d/t stress of job. Since last visit, pt report cutting out coffee and desserts at meals. Pt forgot to complete food journal.     Concerns: Skipping meals, frequent eating out, not practicing mindful eating, not  fluids.     Diet Recall/Time:   Breakfast: none   Am Snack: none   Lunch: cafe- salad and sandwich (20g)   Pm snack: pretzels   Dinner: chinese takeout- meat, rice (20g)   HS Snack: none     Protein: 40 grams    Meals per week away from home: 3-4x     Recommended limiting eating out to no more than 2x/week.  Patient and I reviewed the importance of eating three consistent meals per day; as well as meal timing to be spaced 4-5 hours apart.  Snack choices: " 100-150 calories (1-2x/day if physically hungry), incorporating a fruit/vegetable w/ protein source.    Meal Duration:5-15 minutes  Encouraged slowing meal times down, 20-30 minutes, chewing to applesauce consistency.     Portion Sizes problematic? Yes Per patient/diet recall   To aid in proper portion control and slow meal time down discussed consuming meals off smaller plates, use toddler/children utensils and set utensils down after each bite.    Protein, vegetables/fruits, carbohydrates:   The patient and I discussed the importance of including lean/low fat protein at each meal and limiting carbohydrate intake to less than 25% of plate volume.     Vitamins   Post-op vitamin regimen: Multi Vit + iron 2x/day, calcium citrate 500-600 mg 2x/day, 1071-5838 mcg of Sublingual B-12 daily, and 5000 IU Vitamin D3 daily.    Beverages (Type/Oz. per day)  Water:  oz   Coffee: none  Tea: none   Milk: none   Regular soda: none   Diet soda: none   Juice: none   Kiet-Aid/lemonade/etc: none   Alcohol: none     Discussed the importance of adequate hydration after surgery and the goal of 64+ oz of fluid daily.   The patient understands the importance of avoiding all carbonated, caffeinated and sweetened drinks; and instead choose 64 oz plain water.    Fluid-meal separation:   Pt is working on  fluids 30min before and 30 minutes after meals.  Fluids are not  30min before and 30 minutes after meals.    Exercise  More intentional walking.     Pt's understands that 30-60 minutes of daily activity is an important part of bariatric surgery success.   Encouraged pt to incorporate upper body strength training exercise, even if its lifting soup cans while watching TV at night, doing pushups/sit-ups, and abdominal work.    PES statement:   1.   (NB-1.3)Not ready for diet/lifestyle change related to Failure to adjust for lifestyle changes as evidenced by large portions; skipping meals; frequent grazing;  mindless  eating ; drinking with meals, not chewing each bite to applesauce consistency;  frequent restaurant intake; and no structured activity regimen.         Intervention:  Discussion:   1. Reviewed the Keys to Bariatric Success handout.  2. Reviewed lean protein sources and recommended to consume 15-20gm protein at 3 meals daily.  3. Gave food journal homework, to be completed for f/u appointment.  4. Bariatric Plate: The patient and I discussed the importance of including lean/low  fat protein at each meal and limiting carbohydrate intake to less  than 25% of plate volume.  Instructions/Goals:   1. Include 15-20gm protein at each meal.  2. Increase vegetable/fruit intake, by having a vegetable or fruit with each meal daily. Recommended pt to increase vegetable/fruit intake to 4-5 servings daily.  3. Increase fluid intake to 64oz daily: choose plain or calorie/carbonation-free beverages.  4. Incorporate daily structured activity, 30-60 minutes most days of the week  5. Fill out food journal and bring to next month's visit.  6. Practice plate method: 1/2 plate lean/low fat protein source, vegetable/fruit, <25% of plate complex carbohydrates.  7. Read food labels more consistently: keeping total fat grams <10, total sugar grams <10, fiber >3gm per serving.  8. Separate fluids 30 minutes before/after meal times.  9. Practice eating off of smaller plates/bowls, chewing to applesauce consistency, taking 20-30 minutes to eat in a calm/relaxed environment without distractions of tv/email/cell phone.    Handouts provided:  Bariatric Plate  Food journal  Lean Protein sources      Monitor/Evaluation:     Pt understands the importance of not gaining any weight from initial recorded weight.      Has realistic expectations for weight loss: Yes  Verbalizes understanding of dietary changes post procedure: No  Verbalizes understanding of supplement needs: No  Verbalizes willingness to participate in physical activity: Yes  Motivation  for change: average  Client s predicted compliance on a scale of 1 (low) to 10 (high): 7  RD s prediction for client success and compliance on a scale of 1 (low) to 10 (high):  7      Plan for next visit:   Review Bariatric plate and food journal homework.  Educate on dumping syndrome and reading food labels.  (Final Supervised Diet visit with RD) pre/post-op  diet progression, give review of surgery process.  Review [place] to Bariatric Success  Check Understanding Quiz    Time In: 10:00am  Time Out: 10:30am      ABN signed: Yes

## 2021-06-20 NOTE — PROGRESS NOTES
Follow Up Surgical Weight Loss Supervised Diet Evaluation  Assessment:  Pt presents for follow up supervised diet visit with JEB.    This patient is a 48 y.o.  She is being seen today for follow-up nutritional evaluation. Kristine Scott has been unsuccessful with non-surgical weight loss methods and is interested in bariatric surgery. Today we reviewed the patients current eating habits and level of physical activity, and instructed on the changes that are required for successful bariatric outcomes.    Workflow review: f/u psych apt scheduled, pt has not attended support group, labs complete.   Weight goal: at or below initial      Pt Active Problem List Diagnosis:     Patient Active Problem List   Diagnosis     Bipolar disorder (H)     Heartburn     Sleep apnea     Urinary incontinence     Coronary artery disease     Hypertension     Myocardial infarction     Migraine     Knee pain     Plantar fasciitis     Morbid obesity with BMI of 40.0-44.9, adult (H)     Pt's Initial Weight: 309 lbs  Weight: 309 lb 12.8 oz (140.5 kg)  Weight loss from initial: -0.8  % Weight loss: -0.26 %  Body mass index is 44.45 kg/(m^2).    Calculated RMR ([place]-St Jeor equation): 2147 calories    Progress made since last visit: Pt using protein drinks, cut out fast food, cut out soda. Ice cream craving.   Concerns: dessert intake, fast meal pace, not chewing foods well.     Diet Recall/Time:   Breakfast: protein shake, fruit (30g)   Am Snack: none  Lunch: rose salad with chicken (20g)   Pm snack:fruit or carrot sticks   Dinner: cheese burger, brownie (20g)   HS Snack: none or ice cream     Protein: 70 grams    Meals per week away from home: 2x/month      Recommended limiting eating out to no more than 2x/week.  Patient and I reviewed the importance of eating three consistent meals per day; as well as meal timing to be spaced 4-5 hours apart.  Snack choices: 100-150 calories (1-2x/day if physically hungry), incorporating a fruit/vegetable w/  protein source.    Meal Duration:15 minutes  Encouraged slowing meal times down, 20-30 minutes, chewing to applesauce consistency.     Portion Sizes problematic? Yes Per patient/diet recall   To aid in proper portion control and slow meal time down discussed consuming meals off smaller plates, use toddler/children utensils and set utensils down after each bite.    Protein, vegetables/fruits, carbohydrates:   The patient and I discussed the importance of including lean/low fat protein at each meal and limiting carbohydrate intake to less than 25% of plate volume.     Vitamins   Post-op vitamin regimen: Multi Vit + iron 2x/day, calcium citrate 500-600 mg 2x/day, 2520-9312 mcg of Sublingual B-12 daily, and 5000 IU Vitamin D3 daily.    Beverages (Type/Oz. per day)  Water: 100 oz   Coffee: cup every three days    Discussed the importance of adequate hydration after surgery and the goal of 64+ oz of fluid daily.   The patient understands the importance of avoiding all carbonated, caffeinated and sweetened drinks; and instead choose 64 oz plain water.    Fluid-meal separation:   Pt is not working on  fluids 30min before and 30 minutes after meals.  Fluids are not  30min before and 30 minutes after meals.    Exercise  Pt goes biking and walking, however, no routine established. Pt trying to be more intentional movement throughout the day.     Pt's understands that 30-60 minutes of daily activity is an important part of bariatric surgery success.   Encouraged pt to incorporate upper body strength training exercise, even if its lifting soup cans while watching TV at night, doing pushups/sit-ups, and abdominal work.    PES statement:   1.   (NB-1.3)Not ready for diet/lifestyle change related to Failure to adjust for lifestyle changes as evidenced by large portions; mindless eating ; drinking with meals, not chewing each bite to applesauce consistency; consuming caffeine daily.    Intervention:  Discussion:    1. Reviewed the Keys to Bariatric Success handout.  2. Reviewed lean protein sources and recommended to consume 15-20gm protein at 3 meals daily.  3. Gave food journal homework, to be completed for f/u appointment.  4. Bariatric Plate: The patient and I discussed the importance of including lean/low  fat protein at each meal and limiting carbohydrate intake to less  than 25% of plate volume.    Instructions/Goals:   1. Include 15-20gm protein at each meal.  2. Increase vegetable/fruit intake, by having a vegetable or fruit with each meal daily. Recommended pt to increase vegetable/fruit intake to 4-5 servings daily.  3. Increase fluid intake to 64oz daily: choose plain or calorie/carbonation-free beverages.  4. Incorporate daily structured activity, 30-60 minutes most days of the week  5. Fill out food journal and bring to next month's visit.  6. Practice plate method: 1/2 plate lean/low fat protein source, vegetable/fruit, <25% of plate complex carbohydrates.  7. Read food labels more consistently: keeping total fat grams <10, total sugar grams <10, fiber >3gm per serving.  8. Separate fluids 30 minutes before/after meal times.  9. Practice eating off of smaller plates/bowls, chewing to applesauce consistency, taking 20-30 minutes to eat in a calm/relaxed environment without distractions of tv/email/cell phone.    Handouts provided:  Bariatric Plate  Food journal    Monitor/Evaluation:     Pt understands the importance of not gaining any weight from initial recorded weight.      Has realistic expectations for weight loss: Yes  Verbalizes understanding of dietary changes post procedure: No  Verbalizes understanding of supplement needs: Yes  Verbalizes willingness to participate in physical activity: Yes  Motivation for change: average  Client s predicted compliance on a scale of 1 (low) to 10 (high): 7  RD s prediction for client success and compliance on a scale of 1 (low) to 10 (high):  7    Plan for next visit:    Review Bariatric plate and food journal homework.  Educate on dumping syndrome and reading food labels.  (Final Supervised Diet visit with RD) pre/post-op  diet progression, give review of surgery process.  Review [place] to Bariatric Success  Check Understanding Quiz    Time In: 10:00am  Time Out: 10:30am      ABN signed: Yes

## 2021-06-21 NOTE — PROGRESS NOTES
Follow Up Surgical Weight Loss Supervised Diet Evaluation  Assessment:  Pt presents for follow up supervised diet visit with RD.    This patient is a 48 y.o.  She is being seen today for follow-up nutritional evaluation. Kristine Scott has been unsuccessful with non-surgical weight loss methods and is interested in bariatric surgery. Today we reviewed the patients current eating habits and level of physical activity, and instructed on the changes that are required for successful bariatric outcomes.      Workflow review: No support group, has not completed food journal homework, psych eval completed  Weight goal: at initial or below     Pt Active Problem List Diagnosis:  Patient Active Problem List   Diagnosis     Bipolar disorder (H)     Heartburn     Sleep apnea     Urinary incontinence     Coronary artery disease     Hypertension     Myocardial infarction (H)     Migraine     Knee pain     Plantar fasciitis     Morbid obesity with BMI of 40.0-44.9, adult (H)       Pt's Initial Weight: 309 lbs  Weight: 308 lb (139.7 kg)  Weight loss from initial: 1  % Weight loss: 0.32 %  Body mass index is 44.19 kg/(m^2).    Calculated RMR ([place]-St Jeor equation): 2147 calories    Progress made since last visit: Not drinking at meals, limiting caffeine, not snacking, more mindful of intentional movement. Pt reports eating a lot of sugar in the past month and has really been struggling with diet. She reports eating fast food and eating ice cream. She did not bring in her food journal today.  Concerns: Not enough protein, binge-eating, no structured meals, self-doubting attitude      Diet Recall/Time:   Breakfast: skip  Am Snack: none  Lunch: egg roll salad, noodles, lettuce, cucumber (10-15g)   Pm snack:none  Dinner: pint of ice cream, blizzard, piece of pizza, popover, grazing (10g)  HS Snack: none    Protein: 10-30grams     Meals per week away from home: 8     Recommended limiting eating out to no more than 2x/week.  Patient and  I reviewed the importance of eating three consistent meals per day; as well as meal timing to be spaced 4-5 hours apart.  Snack choices: 100-150 calories (1-2x/day if physically hungry), incorporating a fruit/vegetable w/ protein source.    Meal Duration:15 minutes  Encouraged slowing meal times down, 20-30 minutes, chewing to applesauce consistency.     Portion Sizes problematic? Yes Per patient/diet recall   To aid in proper portion control and slow meal time down discussed consuming meals off smaller plates, use toddler/children utensils and set utensils down after each bite.    Protein, vegetables/fruits, carbohydrates:   The patient and I discussed the importance of including lean/low fat protein at each meal and limiting carbohydrate intake to less than 25% of plate volume.     Vitamins   Post-op vitamin regimen: Multi Vit + iron 2x/day, calcium citrate 500-600 mg 2x/day, 0594-8513 mcg of Sublingual B-12 daily, and 5000 IU Vitamin D3 daily.    Beverages (Type/Oz. per day)  Water: 60-80oz  Coffee: 3 times in the past month  Tea: leyla latte 3x per week  Milk: none  Regular soda: none  Diet soda: none  Juice: none  Kiet-Aid/lemonade/etc: none  Alcohol: none    Discussed the importance of adequate hydration after surgery and the goal of 64+ oz of fluid daily.   The patient understands the importance of avoiding all carbonated, caffeinated and sweetened drinks; and instead choose 64 oz plain water.    Fluid-meal separation:   Pt is working on  fluids 30min before and 30 minutes after meals.  Fluids are  30min before and 30 minutes after meals.    Exercise  walking  Infrequent exercise- parking further away    Pt's understands that 30-60 minutes of daily activity is an important part of bariatric surgery success.     PES statement:   1. (NB-1.3)Not ready for diet/lifestyle change related to Failure to adjust for lifestyle changes as evidenced by large portions; skipping meals; frequent grazing;   mindless eating ; drinking with meals, not chewing each bite to applesauce consistency; consuming caffeine/carbonation daily, frequent restaurant intake; and no structured activity regimen      Intervention:  Discussion:   1. Reviewed lean protein sources and recommended to consume 15-20gm protein at 3 meals daily.  2. Educated on pre/post-op diet progression, post-op vitamin regimen, gave review of surgery process.  3. Gave food journal homework, to be completed for f/u appointment.  4. Encourage patient using motivational interviewing techniques  5. Bariatric Plate: The patient and I discussed the importance of including lean/low  fat protein at each meal and limiting carbohydrate intake to less  than 25% of plate volume.  Instructions/Goals:   1. Include 15-20gm protein at each meal.  2. Increase vegetable/fruit intake, by having a vegetable or fruit with each meal daily. Recommended pt to increase vegetable/fruit intake to 4-5 servings daily.  3. Fill out food journal and bring to next month's visit.  4. Practice plate method: 1/2 plate lean/low fat protein source, vegetable/fruit, <25% of plate complex carbohydrates.  5. Separate fluids 30 minutes before/after meal times.  6. Practice eating off of smaller plates/bowls, chewing to applesauce consistency, taking 20-30 minutes to eat in a calm/relaxed environment without distractions of tv/email/cell phone.    Goals set by patient:  7. Drink protein shake with vitamins in the morning.  8. Walk when using the restroom at work.   9. Fill out food journal      Monitor/Evaluation:     Pt understands the importance of not gaining any weight from initial recorded weight.      Has realistic expectations for weight loss: Yes  Verbalizes understanding of dietary changes post procedure: Yes  Verbalizes understanding of supplement needs: Yes  Verbalizes willingness to participate in physical activity: Yes  Motivation for change: average  Client s predicted compliance on a  scale of 1 (low) to 10 (high): 5  RD s prediction for client success and compliance on a scale of 1 (low) to 10 (high):  4      Plan for next visit:   Review Bariatric plate and food journal homework.  Educate on dumping syndrome and reading food labels.  (Final Supervised Diet visit with RD) pre/post-op  diet progression, give review of surgery process.  Review [place] to Bariatric Success  Check Understanding Quiz    Time In: 2:30p  Time Out: 3:00p      ABN signed: Yes    Visit completed and documented by Christine Hall RD, LD

## 2021-06-22 NOTE — PROGRESS NOTES
Follow Up Surgical Weight Loss Supervised Diet Evaluation  Assessment:  Pt presents for follow up supervised diet visit with RD.    This patient is a 48 y.o.  She is being seen today for follow-up nutritional evaluation. Kristine Scott has been unsuccessful with non-surgical weight loss methods and is interested in bariatric surgery. Today we reviewed the patients current eating habits and level of physical activity, and instructed on the changes that are required for successful bariatric outcomes.    Workflow review: labs complete, attended support group, psych cleared.   Weight goal: at or below initial      Pt Active Problem List Diagnosis:     Patient Active Problem List   Diagnosis     Bipolar disorder (H)     Heartburn     Sleep apnea     Urinary incontinence     Coronary artery disease     Hypertension     Myocardial infarction (H)     Migraine     Knee pain     Plantar fasciitis     Morbid obesity with BMI of 40.0-44.9, adult (H)       Pt's Initial Weight: 309 lbs  Weight: (!) 290 lb (131.5 kg)  Weight loss from initial: 19  % Weight loss: 6.15 %    Body mass index is 41.61 kg/m .    Calculated RMR ([place]-St Jeor equation):  2147 calories    Progress made since last visit: Pt made great changes in her diet by cutting out binge eating late night and large portion sizes. Pt brought in completed food journal.     Diet Recall/Time:   Breakfast: protein shake, banana (30g)   Am Snack: none   Lunch: chicken, salad (20g)  Pm snack:2 string cheese sticks (14g)  Dinner: pork, lettuce, tortilla (20g)   HS Snack: none     Protein: 80-90 grams    Meals per week away from home: purchases salads for lunch.      Recommended limiting eating out to no more than 2x/week.  Patient and I reviewed the importance of eating three consistent meals per day; as well as meal timing to be spaced 4-5 hours apart.  Snack choices: 100-150 calories (1-2x/day if physically hungry), incorporating a fruit/vegetable w/ protein source.    Meal  Duration:20 minutes  Encouraged slowing meal times down, 20-30 minutes, chewing to applesauce consistency.     Portion Sizes problematic? No Per patient/diet recall   To aid in proper portion control and slow meal time down discussed consuming meals off smaller plates, use toddler/children utensils and set utensils down after each bite.    Protein, vegetables/fruits, carbohydrates:   The patient and I discussed the importance of including lean/low fat protein at each meal and limiting carbohydrate intake to less than 25% of plate volume.     Vitamins   Post-op vitamin regimen: Multi Vit + iron 2x/day, calcium citrate 500-600 mg 2x/day, 6467-9963 mcg of Sublingual B-12 daily, and 5000 IU Vitamin D3 daily.    Beverages (Type/Oz. per day)  Water: 60 oz   Coffee: none   Tea: none   Milk: none   Regular soda: none   Diet soda: none   Juice: none   Iket-Aid/lemonade/etc: none   Alcohol: none     Discussed the importance of adequate hydration after surgery and the goal of 64+ oz of fluid daily.   The patient understands the importance of avoiding all carbonated, caffeinated and sweetened drinks; and instead choose 64 oz plain water.    Fluid-meal separation:   Pt is working on  fluids 30min before and 30 minutes after meals.  Fluids are  30min before and 30 minutes after meals.    Exercise  Walking 5 days a week.     Pt's understands that 30-60 minutes of daily activity is an important part of bariatric surgery success.   Encouraged pt to incorporate upper body strength training exercise, even if its lifting soup cans while watching TV at night, doing pushups/sit-ups, and abdominal work.    PES statement:   1. (NI-1.3)Excessive energy intake related to Food and nutrition related knowledge deficit concerning excessive energy/oral intake as evidenced by Intake of high caloric density foods at meals and/or snacks; large portions; Estimated intake that exceeds estimated daily energy intake; Frequent  excessive fast food or restaurant intake; and BMI 41.     Intervention:  Discussion:   1. Reviewed the Keys to Bariatric Success handout.  2. Educated pt on dumping syndrome  3. Discussed pre/post op diet progression  4. Educated pt on vitamins needed after surgery.   5. Reviewed Bariatric plate and food journal homework.    Instructions/Goals:   1. Include 15-20gm protein at each meal.  2. Increase vegetable/fruit intake, by having a vegetable or fruit with each meal daily. Recommended pt to increase vegetable/fruit intake to 4-5 servings daily.  3. Increase fluid intake to 64oz daily: choose plain or calorie/carbonation-free beverages.  4. Incorporate daily structured activity, 30-60 minutes most days of the week  5. Fill out food journal and bring to next month's visit.  6. Practice plate method: 1/2 plate lean/low fat protein source, vegetable/fruit, <25% of plate complex carbohydrates.  7. Read food labels more consistently: keeping total fat grams <10, total sugar grams <10, fiber >3gm per serving.  8. Separate fluids 30 minutes before/after meal times.  9. Practice eating off of smaller plates/bowls, chewing to applesauce consistency, taking 20-30 minutes to eat in a calm/relaxed environment without distractions of tv/email/cell phone.    Monitor/Evaluation:     Pt understands the importance of not gaining any weight from initial recorded weight.      Has realistic expectations for weight loss: Yes  Verbalizes understanding of dietary changes post procedure: Yes  Verbalizes understanding of supplement needs: Yes  Verbalizes willingness to participate in physical activity: Yes  Motivation for change: average  Client s predicted compliance on a scale of 1 (low) to 10 (high): 7  RD s prediction for client success and compliance on a scale of 1 (low) to 10 (high):  7    Pt has made the necessary changes, and is knowledgeable and well-informed of the dietary and physical activity requirements that are necessary  for successful bariatric outcomes. This Pt is an appropriate candidate for surgery from a nutrition standpoint at this time. The patient understands that surgery is a tool, and not a cure, and our aftercare program must be followed.    Time In: 2:30pm  Time Out: 3:00pm      ABN signed: Yes

## 2021-06-22 NOTE — PROGRESS NOTES
Patient cleared by Psych and RD. Workflow updated.  Patient is ready for an AB Consult.  Karyna Schulte RN

## 2021-06-22 NOTE — PROGRESS NOTES
I have submitted a prior authorization request on behalf of this patient to [facility] to be approved for a LRNY with Dr. Ab Connor

## 2021-06-22 NOTE — PROGRESS NOTES
Attended support group. Workflow updated.    Iwona Hairston [facility]  P: [phone]  F: [phone]

## 2021-06-22 NOTE — PROGRESS NOTES
HPI: Kristine Scott is a 48 y.o. female here today for consideration of metabolic and bariatric surgery. She is referred by Dr. De León.  She has struggled with obesity for the past 30 years, having been a normal weight at the end of high school.  With her gradual weight gain over time, she has made multiple attempts at losing weight with commercial product such as weight watchers.  She has been moderately successful with these in the past, losing up to 60 pounds, but was unable to maintain the weight loss over the long-term ultimately gaining it back and then some.  Her highest weight was around 317 pounds, and she has managed to lose almost 30 pounds to her current weight working with our dietitians and bariatric nutrition.  Her psychiatric history is notable for bipolar disease, currently controlled on Cymbalta and Lamictal.    Her goal is to increase her overall state of well-being, and cure her sleep apnea so she can get rid of her CPAP machine.  She would also like to achieve a weight of 200 pounds or less.    Her bariatric comorbidities include coronary artery disease with cardiomyopathy, hypertension, gastroesophageal reflux disease, obstructive sleep apnea requiring CPAP, and urinary incontinence.  With regard to her gastroesophageal reflux disease, this has improved over the past several months with her weight loss.    Bariatric comorbidities not present include type 2 diabetes.      Allergies:Codeine; Hydrocodone bitartrate; and Venom-honey bee    Past Medical History:   Diagnosis Date     Bipolar disorder (H)      Cardiomyopathy (H)      Coronary artery disease 04/01/2013    2 stents     Heartburn      Hypertension      Knee pain      Migraine      Morbid obesity with BMI of 40.0-44.9, adult (H)      Myocardial infarction 20013     Plantar fasciitis      Sleep apnea      Urinary incontinence        Past Surgical History:   Procedure Laterality Date     BREAST BIOPSY  2012     CHOLECYSTECTOMY  2010     little  toe surgery       REDUCTION MAMMAPLASTY  1998     TUBAL LIGATION  2004     WISDOM TOOTH EXTRACTION         CURRENT MEDS:  Current Outpatient Medications   Medication Sig Dispense Refill     ALPRAZolam (XANAX) 0.25 MG tablet Take 0.25 mg by mouth as needed.       ARIPiprazole (ABILIFY) 5 MG tablet Take 5 mg by mouth daily.       aspirin 81 MG EC tablet Take 81 mg by mouth every evening.       atorvastatin (LIPITOR) 40 MG tablet Take 40 mg by mouth daily.       cyclobenzaprine (FLEXERIL) 10 MG tablet Take 10 mg by mouth as needed.       DULoxetine (CYMBALTA) 60 MG capsule Take 60 mg by mouth daily.       EPINEPHrine (EPIPEN 2-YANA) 0.3 mg/0.3 mL injection Inject into the shoulder, thigh, or buttocks as needed.       lamoTRIgine (LAMICTAL) 200 MG tablet Take 200 mg by mouth 2 (two) times a day.       levonorgestrel (MIRENA) 20 mcg/24 hr (5 years) IUD 1 Device by Intrauterine route.       metoprolol tartrate (LOPRESSOR) 25 MG tablet Take 25 mg by mouth at bedtime.  3     multivitamin (ONE A DAY) per tablet Take 1 tablet by mouth daily.       nitroglycerin (NITROSTAT) 0.4 MG SL tablet Place 0.4 mg under the tongue as needed.       omega 3-dha-epa-fish oil 183.3 mg-75 mg -91.6 mg-306 mg cap Take 1 capsule by mouth daily.       tablet cutter Misc CPAP, heated humidifier, mask, headgear, filters and heated tubing. For home use. Pressure: 10   Length of Need: 99       VITAMIN B COMPLEX ORAL Take 1 tablet by mouth daily.       No current facility-administered medications for this visit.          Family History   Problem Relation Age of Onset     Depression Mother      Hypotension Mother      Anemia Mother      Cancer Mother      No Medical Problems Sister      Obesity Brother      Sleep apnea Brother      Hyperlipidemia Brother      Hypertension Brother      Depression Sister      Obesity Sister         reports that  has never smoked. she has never used smokeless tobacco. She reports that she drinks alcohol. She reports that  "she does not use drugs.    Review of Systems -  A complete ROS was reviewed and except for what is listed in the HPI above, all others are negative  PSYCHIATRIC: She has undergone a lifestyle assessment and has been deemed a good candidate for bariatric surgery by the psychologist.    Ht 5' 10\" (1.778 m)   Wt (!) 289 lb (131.1 kg)   Breastfeeding? No   BMI 41.47 kg/m    Wt Readings from Last 3 Encounters:   12/06/18 (!) 289 lb (131.1 kg)   11/30/18 (!) 290 lb (131.5 kg)   10/26/18 (!) 308 lb (139.7 kg)     Body mass index is 41.47 kg/m .    EXAM:  GENERAL: This is a well-developed 48 y.o. female who appears her stated age  HEAD & NECK: Grossly normal.  No visible goiter or scars  CARDIAC: RRR without murmur  CHEST/LUNG:  Clear to auscultation  ABDOMEN: Obese.  Nontender.  No hernias or masses appreciated.  LYMPHATIC:  No significant adenopathy appreciated.    EXTREMITIES: Grossly normal.  No evidence of chronic venous stasis.    NEUROLOGIC: Focally intact  INTEGUMENT: No open lesions or ulcers  PSYCHIATRIC: Normal affect. She has a good grasp on the nature of her obesity and the treatment options.    LABS:  Lab Results   Component Value Date    WBC 9.1 05/16/2018    HGB 14.4 05/16/2018    HCT 42.9 05/16/2018    MCV 95 05/16/2018     05/16/2018     INR/Prothrombin Time      Lab Results   Component Value Date    HGBA1C 5.7 05/16/2018     No results found for: ALT, AST, GGT, ALKPHOS, BILITOT    Assessment/Plan: 48 y.o. female who is an excellent candidate for bariatric and metabolic surgery.  After a careful conversation with the patient it was decided that a laparoscopic Teri-en-Y gastric bypass would be her best option.       I went over the surgery in detail with her.  I went over the nature of the operation and some of the potential consequences of the surgery.  I went over the expected hospital course and discussed laparoscopic versus open surgery, understanding that we will plan on doing this " laparoscopically with the possibility of having to convert to an open operation.  I went over some of the risks and complications of the operation including, but not limited to, DVT, pulmonary emboli, pneumonia, postoperative bleeding, wound infection, staple line leak, intra-abdominal sepsis, and possible death.  I also went over some of the potential nutritional concerns such as vitamin B-12, iron, vitamin D, vitamin A, calcium and protein deficiencies.  I will also went over the need for lifelong nutritional surveillance.  The patient understands and wants to proceed with surgery.  We will submit for prior authorization.      Ab Connor MD  [facility] Department of Surgery

## 2021-06-22 NOTE — PATIENT INSTRUCTIONS - HE
Your surgery is scheduled for 1/29/2019 at 7:30 am.  Please arrive at 5:30 am.    Medication Instructions:    Alprazolam (Brand Name: Xanax)   Small enough to swallow whole.  Aripiprazole (Brand Name: Abilify)  Ok to cut/crush regular tablet. This medication may not be absorbed as well after Teri-en-Y gastric bypass surgery. Watch for changes in symptom control and talk with your primary care doctor if you notice any changes in how well this medication is working.    Aspirin   Stop 7 days before date of surgery.  Max of 81mg enteric coated aspirin per day after surgery is ok. Usually may restart 48 hours after surgery.  Swallow enteric coated aspirin whole.    Atorvastatin (Brand Name: Lipitor)  Ok to cut/crush. Ok to take the morning of surgery.    Calcium  Ok to continue until surgery.  Do not take the day of surgery.  Do not re-start until date given by dietitian at post-op class. All patients will need to take Calcium Citrate after surgery and this will need to be chewable for the first 3 months following surgery.    Duloxetine (Brand Name: Cymbalta)   Not recommended by  to open capsule.  However, some patients have previously done this until able to swallow capsules whole (at 6 weeks after surgery).  Check with your primary doctor before doing this.  If ok with your doctor, you may open capsule and pour the medicine into a small amount of soft food. Stir this mixture well and swallow it without chewing immediately after mixing.  However, this medication may not be absorbed as well after Teri-en-Y gastric bypass surgery.  Watch for changes in symptom control.  Talk with primary doctor if you notice any changes in how well this medication is working after surgery.    Fish Oil or Omega 3   Stop taking 2 weeks before surgery (can increase bleeding risk).  Consider waiting to restart capsules until 6 weeks after surgery when you may swallow capsules whole again.  Check with primary doctor for  directions.  Lamotrigine (Brand Name: Lamictal)   If small enough, swallow tablet whole.  Ok to cut/crush if too large, but may taste bitter.  This medication may not be absorbed as well after Teri-en-Y gastric bypass surgery. Watch for changes in symptom control and talk with your primary care doctor if you notice any changes in how well this medication is working.    Metoprolol tartarate (Brand Name: Lopressor)   Ok to cut/crush. Ask primary doctor how often to monitor your blood pressure after surgery, and if you need to change your dosage. *Take a.m. of surgery with a sip of water*    Multivitamin with Iron   If not already taking, start chewable MVI with at least 18mg of iron and 10-15 mg of zinc twice a day at least 2 weeks prior to surgery and resume the day after surgery for life. Do not take the morning of surgery.    Nitroglycerin (Brand Name: Nitrostat)  Okay to continue to take.    Omeprazole (Brand Name: Prilosec)   If not already taking, you will get a prescription to start when you get home from the hospital.  Tablets cannot be cut or crushed.  If a capsule, entire capsule contents may be sprinkled on a spoonful of applesauce and taken immediately without chewing.  If only on a full liquid diet, dump capsule contents into a spoonful of liquid and take without chewing.  May swallow whole again starting 6 weeks after surgery but can try swallowing sooner if having issues with opening into food.  Continue after surgery for at least 3 months.    Vitamin B12   If not already taking, start sublingual (under the tongue) vitamin B12 1,000 mcg at least 2 weeks prior to surgery and resume the day after surgery for life. Do not take the morning of surgery.    Vitamin B complex   Stop taking 2 weeks before surgery.  May use if ok with bariatric dietitian.    [facility]   Laparoscopic Teri-en-Y Gastric Bypass, Gastric Sleeve & Single Anastomosis Duodenal Switch  Home Discharge Instructions      Coughing  and Deep Breathing   Use your incentive spirometer frequently after you get home. Continued coughing and deep breathing help prevent complications such as fevers and pneumonia. If you are fever free after one week, stop using it, and discard it.    Incision and Dressing   All incisional coverings can get wet so you may continue to shower at home.  If you have Band-Aids, they should come off while in the hospital.  Remove all steri-strips one week from your surgery date unless told otherwise by the surgeon.  If you have gauze covered by a clear dressing, remove 2-3 days after surgery as directed by the surgeon.     Notify the clinic or your surgeon if:  You develop a fever orally of 101.5 or greater, see any unusual bright red or green infection-like drainage; see redness or swelling around the incision; have left shoulder pain or pain that does not go away with your narcotic; increasing anxiety or feeling that something is just  not right;  a persistent rapid heart rate (greater than or equal to 120 beats per minute) lasting longer than 15 minutes; progressive rapid breathing; increasing shortness of breath; continuous (non-stop) hiccups lasting longer than 15 minutes; persistent nausea; if you are unable to keep liquids down; have frothy vomiting; difficulty swallowing; excessive bloating; swelling, warmth, discoloration or pain in your lower legs; dark, bright red, black, or tarry bowel movements; or anything you are concerned might be an urgent problem or need reassurance about.    Dehydration/Nausea/Vomiting  Vomiting is not normal after surgery and can be caused by drinking with meals, eating large portions, not chewing thoroughly and drinking large sips.  If you continue to have nausea and vomiting despite following our recommendations, call the clinic.  Nausea can be caused by dehydration so make sure you are drinking the suggested amount of fluids.  Symptoms of dehydration include dark colored urine, lack of  energy, nausea, dizziness, headache and a bad taste in your mouth.  If you notice any of these symptoms, please don t delay in calling the clinic.  Early identification gives us more options for treatment.    Bowel Movements  Consider that your stools might be loose since you are currently only taking in fluids. Diarrhea may be caused by dumping syndrome if you had Gastric Bypass, so make sure you aren t drinking liquids containing excess sugar.  Otherwise, call the clinic if you have 3 or more diarrhea stools per day. If constipation is a problem, it is ok to use a Dulcolax  rectal suppository, Miralax or Milk of Magnesia . Other helpful ways to avoid constipation include: increasing your fluids; stop taking narcotic medications; and increasing your activity. Adding Benefiber  daily to your liquids once you have had a stool may help keep you regular until there is more natural fiber in your diet. You may also have foul smelling gas.    Risk for Blood Clots  For the next 6 weeks, if you are in any vehicle longer than 30 minutes, stop every 30 minutes, and walk for at least 3 minutes before continuing your journey. Traveling and/or flying are not recommended for 6 weeks.  If leaving the country within the first 3-6 months after surgery, check with your surgeon first.    Activity  Do not lift greater than 20 lbs. for 2 weeks unless told differently by your surgeon. After two weeks, let your body be your guide. You may drive only after you have been completely off of narcotics for a minimum of 24 hours. Continue to shower as you have in the hospital. NO BATHING IN THE BATHTUB, SWIMMING, etc. for 2-4 weeks.  (You need to be sure your incisions are well healed to prevent infection.)    Pain Control  You will go home with a prescription for pain medication. If your pain does not go away with this medication, please notify your surgeon. If the pain requires medication, but not something as strong as the prescription, you  may try Tylenol  (acetaminophen) cut  <    inch or crushed.   DO NOT USE PRESCRIBED OR OVER THE COUNTER NONSTEROIDAL ANTI-  INFLAMMATORY MEDICATION LIKE MOTRIN, ADVIL, IBUPROFEN OR ASPIRIN.    Acid Reducer and Gallbladder Medication  It is important to reduce the amount of acid in your new stomach for a couple months after surgery while it is healing.  We will prescribe an acid reducer that you should take daily.  It is important for you to let us know if you have any symptoms of heartburn or reflux.      For those of you who still have a gallbladder, we will also prescribe a medication called Actigall (ursodiol) and we recommend taking it twice a day for 6 months.  It is only effective if you take it twice a day.  You will start taking this two weeks after surgery.    ER Needs  If you need to go to the Emergency Room, we prefer you go to the [facility] ER.  Be sure to inform the ER staff that you are a bariatric surgery patient, and ask them to notify your surgeon that you are there.    Remember to refer to your bariatric program handbook and keep follow up appointments for long-term success. You will need regular labs to check your nutritional status and it is very important to take ALL your supplements and protein religiously,    For urgent concerns on evenings, weekends & holidays, call the clinic and the message will direct you to the surgeon on call.  [facility]: [phone]

## 2021-06-22 NOTE — PROGRESS NOTES
Patient attended group class to review pre-op instructions and dietary plan for upcoming surgery.    Pt will begin her 2 week liquid diet on 1/15/2019 and will do clear liquids the day before surgery.  Pt is scheduled for Teri-En-Y on 1/29/2019 and will follow a 1 week post-op liquid diet.  Pt will f/u with an RD 1 week post-op for further diet advancement.    Educated pt on 2 week pre-op and 1-2 week post-op liquid diets.  Discussed appropriate liquids and demonstrated portions for each of the food groupings during each diet phase.  Reviewed appropriate calories/protein/fluid goals during the 2 week pre-op liquid diet. Educated on correct vitamins/minerals to take after surgery in correct dosage and frequency.  Provided grocery lists and sample menu plans for each diet stage, as well as unflavored protein powder samples.    Discussed admission process and hospital course.  Pharmacy information packet given and explained. Patient was given exercises to work on post-op for maintaining muscle mass and strengthening that was created by [employer] [facility].  Bariatric quiz given to pt for review on their own. Discharge instructions, information card and follow up appointments given and reviewed with pt at this time and patient verbalized understanding. She will have her H&P at [facility] next week and do her pre-op testing at that visit.  Prescriptions for Omeprazole sent to the patient's pharmacy to be started after surgery.      Julia Rodriguez RN, CBN  [facility]  P [phone]  F [phone]

## 2021-06-23 NOTE — TELEPHONE ENCOUNTER
Post-Op Phone Call  [facility]    Surgeon: Ab Connor M.D.  Date of Surgery: 1/29/2019  Discharge Date: 1/30/2019    Date/Time Called:   Date: 2/1/2019 Time: 4:45 PM   Attempt: First    Patient unavailable, message left to call [facility] with questions/problems? No    Pain Control:  Intensity: No Pain (0)  Duration/Location/Explain:   What makes it better/worse?     Medications:  Narcotic Use - No  Drug type: gabapentin every 8 hours  Frequency:     Non-prescription pain control: Tylenol every 6 hours.    Other medications currently taking: see med list    Complete Multivitamin + Iron BID? Yes    Vitamin B12? sublingual daily    Incisions:  Drainage? clean and dry  Comment: Steri strips.    Intake/Output:  Fluid Intake(oz/day)? 60 oz   Fluid type? water    Heartburn? No  Counseling: Omeprazole  Nausea? No  Vomiting? No  Explain:     Voiding Frequency? 4 or more/day     Voiding-Color/Amount? Normal.    Flatus? Yes    Bowel Movement?No, discussed constipation.      Are you using your incentive spirometer? If yes, how often? 3+/day    Any fever type symptoms? No  Explain:     Walking activity?   Frequency/Type: walking around the house and doing the stairs.    In Preparation for Surgery:  On a scale of 1-5, with 5 being the highest, how well did the pre-op class prepare you for what actually happened in the hospital? 5  If you were unable to give us a 5, what could we have done to earn a 5?     Is there anything that you wish you would have known prior to surgery that you did not know? If yes, what? No.    On a scale of 1-5, with 5 being the highest, how was the service while you were in the hospital? 5, would give them a 10 if she could, everyone was phenomenal!   If you were unable to give us a 5, what could we have done to earn a 5?     Is/was there anyone in particular; nurse, aide, hospital staff, that did a great job and you would like us to recognize? If yes, whom. Sade Schwartz (PARKER),  KG  What did they do? So nice and didn't make her feel dumb for asking questions.    Would you recommend [facility] to others? Yes  If no, can you explain why?     Would you recommend [facility] to others?Yes  If no, can you explain why?      Thank you for your time. Please do not hesitate to call us with any questions or concerns.    Call completed by:   Julia Rodriguez RN, [facility]  P [phone]  F [phone]

## 2021-06-23 NOTE — ANESTHESIA PREPROCEDURE EVALUATION
Anesthesia Evaluation      Patient summary reviewed   History of anesthetic complications (ponv-all emetogenic procedures (lap kelly, BTL))     Airway   Mallampati: II  Neck ROM: full   Pulmonary - normal exam   (+) sleep apnea on CPAP, ,   (-) shortness of breath                         Cardiovascular   (+) hypertension well controlled, past MI (6 yrs ago - no recent sob/anginea), CAD, , hypercholesterolemia,     (-) angina  Rhythm: regular  Rate: normal,         Neuro/Psych    (+) neuromuscular disease,      Comments: Bipolar d/o    Endo/Other - negative ROS      GI/Hepatic/Renal - negative ROS           Dental    (+) poor dentition                       Anesthesia Plan  Planned anesthetic: general endotracheal  preop sab/morphine  Ketamine  mgso4  ASA 3   Induction: intravenous   Anesthetic plan and risks discussed with: patient  Anesthesia plan special considerations: rapid sequence induction, antiemetics,   Post-op plan: routine recovery

## 2021-06-23 NOTE — ANESTHESIA POSTPROCEDURE EVALUATION
Patient: Kristine Scott  #1  LAPAROSCOPIC FRANSISCA-EN Y, GASTRIC BYPASS  Anesthesia type: general    Patient location: PACU  Last vitals:   Vitals:    01/29/19 1030   BP: 100/47   Pulse: 67   Resp: 20   Temp: 36.6  C (97.9  F)   SpO2: 91%     Post vital signs: stable  Level of consciousness: awake and responds to simple questions  Post-anesthesia pain: pain controlled  Post-anesthesia nausea and vomiting: no  Pulmonary: unassisted, return to baseline  Cardiovascular: stable and blood pressure at baseline  Hydration: adequate  Anesthetic events: no    QCDR Measures:  ASA# 11 - Denice-op Cardiac Arrest: ASA11B - Patient did NOT experience unanticipated cardiac arrest  ASA# 12 - Denice-op Mortality Rate: ASA12B - Patient did NOT die  ASA# 13 - PACU Re-Intubation Rate: ASA13B - Patient did NOT require a new airway mgmt  ASA# 10 - Composite Anes Safety: ASA10A - No serious adverse event    Additional Notes:

## 2021-06-23 NOTE — ANESTHESIA CARE TRANSFER NOTE
Last vitals:   Vitals:    01/29/19 0904   BP: 155/67   Pulse: 76   Resp: 16   Temp: 36.7  C (98  F)   SpO2: 100%     Patient's level of consciousness is drowsy  Spontaneous respirations: yes  Maintains airway independently: yes  Dentition unchanged: yes  Oropharynx: oropharynx clear of all foreign objects    QCDR Measures:  ASA# 20 - Surgical Safety Checklist: WHO surgical safety checklist completed prior to induction    PQRS# 430 - Adult PONV Prevention: 4558F - Pt received => 2 anti-emetic agents (different classes) preop & intraop  ASA# 8 - Peds PONV Prevention: NA - Not pediatric patient, not GA or 2 or more risk factors NOT present  PQRS# 424 - Denice-op Temp Management: 4559F - At least one body temp DOCUMENTED => 35.5C or 95.9F within required timeframe  PQRS# 426 - PACU Transfer Protocol: - Transfer of care checklist used  ASA# 14 - Acute Post-op Pain: ASA14B - Patient did NOT experience pain >= 7 out of 10

## 2021-06-23 NOTE — ANESTHESIA PROCEDURE NOTES
Spinal Block    Patient location during procedure: pre-op  Start time: 1/29/2019 7:06 AM  End time: 1/29/2019 7:08 AM  Reason for block: at surgeon's request, post-op pain management and primary anesthetic    Staffing:  Performing  Anesthesiologist: Stephen Phelan MD    Preanesthetic Checklist  Completed: patient identified, risks, benefits, and alternatives discussed, timeout performed, consent obtained, at patient's request, airway assessed, oxygen available, suction available, emergency drugs available and hand hygiene performed  Spinal Block  Patient position: sitting  Prep: ChloraPrep  Patient monitoring: heart rate, cardiac monitor, continuous pulse ox and blood pressure  Approach: midline  Location: L3-4  Injection technique: single-shot  Needle type: pencil-tip   Needle gauge: 24 G      Additional Notes:  Done in preop

## 2021-06-23 NOTE — PROGRESS NOTES
Received [facility] paperwork for Kristine. It has been filled out, signed by Dr. Connor and faxed to Gayatri Cannon at [phone].     Alissa Guevara CMA ([facility])

## 2021-06-23 NOTE — PROGRESS NOTES
HPI: Pt is here for follow up of a laparoscopic Teri-en-Y gastric bypass 2 weeks ago. She is doing well. Taking po well, estimating she is getting in 60+ oz of fluid. No vomiting.  No pain with drinking or eating purees. No fevers or chills. Ambulating without problems.     Current Outpatient Medications   Medication Sig Dispense Refill     ALPRAZolam (XANAX) 0.25 MG tablet Take 0.25-0.5 mg by mouth 2 (two) times a day as needed for anxiety.              aspirin 81 MG EC tablet Take 81 mg by mouth daily with supper.              atorvastatin (LIPITOR) 40 MG tablet Take 40 mg by mouth every morning.              clopidogrel (PLAVIX) 75 mg tablet Take 75 mg by mouth daily.       cyanocobalamin, vitamin B-12, 1,000 mcg Subl Place 1,000 mcg under the tongue daily.       DULoxetine (CYMBALTA) 60 MG capsule Take 120 mg by mouth daily.              lamoTRIgine (LAMICTAL) 200 MG tablet Take 200 mg by mouth 2 (two) times a day.              levonorgestrel (MIRENA) 20 mcg/24 hr (5 years) IUD 1 Device by Intrauterine route.       metoprolol tartrate (LOPRESSOR) 25 MG tablet Take 25 mg by mouth at bedtime.         3     omeprazole (PRILOSEC) 20 MG capsule Take 1 capsule (20 mg total) by mouth daily. For the first 6 weeks, open capsule & sprinkle contents into liquids or onto food. (Patient taking differently: Take 20 mg by mouth daily. HASN'T STARTED YET-For the first 6 weeks, open capsule & sprinkle contents into liquids or onto food.      ) 90 capsule 0     pediatric multivitamin (FLINTSTONES) Chew chewable tablet Chew 1 tablet 2 (two) times a day.       tablet cutter Misc CPAP, heated humidifier, mask, headgear, filters and heated tubing. For home use. Pressure: 10   Length of Need: 99       calcium citrate-vitamin D (CITRACAL+D) 315-200 mg-unit per tablet Take 2 tablets by mouth daily.       cholecalciferol, vitamin D3, 2,000 unit Tab Take 2,000 Units by mouth daily.       EPINEPHrine (EPIPEN 2-YANA) 0.3 mg/0.3 mL injection  "Inject 0.3 mg into the shoulder, thigh, or buttocks as needed for anaphylaxis.              nitroglycerin (NITROSTAT) 0.4 MG SL tablet Place 0.4 mg under the tongue every 5 (five) minutes as needed for chest pain.              omega 3-dha-epa-fish oil 183.3 mg-75 mg -91.6 mg-306 mg cap Take 1 capsule by mouth daily.       VITAMIN B COMPLEX ORAL Take 1 tablet by mouth daily.       No current facility-administered medications for this visit.          /86 (Patient Site: Right Arm, Patient Position: Sitting, Cuff Size: Adult Large)   Pulse 94   Ht 5' 10\" (1.778 m)   Wt (!) 266 lb (120.7 kg)   SpO2 97%   Breastfeeding? No   BMI 38.17 kg/m    Wt Readings from Last 3 Encounters:   02/11/19 (!) 266 lb (120.7 kg)   01/29/19 (!) 273 lb 1.6 oz (123.9 kg)   01/07/19 (!) 290 lb (131.5 kg)     Body mass index is 38.17 kg/m .    EXAM:  GENERAL:Appears well  ABDOMEN: Incisions healing well      Assessment/Plan: Pt s/p laparoscopic Teri-en-Y gastric bypass. Doing well. Diet and activity discussed. She will [facility] in 1 month to meet with our dietician, and again at 3 months for additional follow up.    Ab Connor MD  [facility] Department of Surgery  "

## 2021-06-24 NOTE — PROGRESS NOTES
Time in: 10:00a   /Time out: 11:00a     BMI: There is no height or weight on file to calculate BMI.   Pt presents for 1 week post op dietitian follow up. Reports tolerating full liquids well. Denies n/v, constipation/diarrhea, or significant pain. Taking MVI and SL B12 appropriately. Educated pt on pureed and soft to bariatric regular diets. Provided grocery list and sample meal plan for each diet stage.  Pt will begin pureed diet on 2/5/2019 and advance as tolerated to softs/bariatric regular on 2/19/2019. Instructed pt to begin 500 mg calcium citrate BID and 5000IU Vitamin D3 on 2/19/2019. Pt to follow up with RD at 3 months post op.

## 2021-06-25 NOTE — TELEPHONE ENCOUNTER
Sent video link and attempted to call for virtual visit at 5:00p. Left a detailed message encouraging patient to call back to reschedule when able.
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
## 2021-06-27 NOTE — PROGRESS NOTES
Progress Notes by Sherly Hickey MD at 7/31/2019  3:30 PM     Author: Sherly Hickey MD Service: -- Author Type: Physician    Filed: 7/31/2019  4:57 PM Encounter Date: 7/31/2019 Status: Signed    : Sherly Hickey MD (Physician)       Bariatric Follow Up Visit with a History of Previous Bariatric Surgery     Date of visit: 7/31/2019  Physician: Sherly Zamora MD  Primary Care Provider:  Lilly De León DO Lynn M Dahn   49 y.o.  female    Date of Surgery: 1/29/2019  Initial Weight: 309  Initial BMI: 44.34  Today's Weight:   Wt Readings from Last 1 Encounters:   07/31/19 218 lb 6.4 oz (99.1 kg)     Body mass index is 31.34 kg/m .      Assessment and Plan     Assessment: Kristine is a 49 y.o. year old female who is 6 months s/p  Teri en Y Gastric Bypass with Dr. Zhane Scott feels as if she has achieved the goals she hoped to accomplish through bariatric surgery and weight loss.    Encounter Diagnosis   Name Primary?   ? Postoperative malabsorption Yes         Current Outpatient Medications:   ?  ALPRAZolam (XANAX) 0.25 MG tablet, Take 0.25-0.5 mg by mouth 2 (two) times a day as needed for anxiety.    , Disp: , Rfl:   ?  aspirin 81 MG EC tablet, Take 1 tablet (81 mg total) by mouth daily with supper., Disp: , Rfl: 0  ?  atorvastatin (LIPITOR) 40 MG tablet, Take 1 tablet (40 mg total) by mouth at bedtime., Disp: , Rfl: 0  ?  calcium citrate-vitamin D (CITRACAL+D) 315-200 mg-unit per tablet, Take 2 tablets by mouth daily., Disp: , Rfl: 0  ?  cholecalciferol, vitamin D3, 2,000 unit Tab, Take 1 tablet (2,000 Units total) by mouth daily., Disp: , Rfl: 0  ?  cyanocobalamin, vitamin B-12, 1,000 mcg Subl, Place 1,000 mcg under the tongue daily., Disp: , Rfl:   ?  cyclobenzaprine (FLEXERIL) 10 MG tablet, Take 5-10 mg by mouth every 8 (eight) hours as needed (spasms).    , Disp: , Rfl:   ?  DULoxetine (CYMBALTA) 60 MG capsule, Take 120 mg by mouth daily.    , Disp: , Rfl:   ?  EPINEPHrine (EPIPEN  2-YANA) 0.3 mg/0.3 mL injection, Inject 0.3 mg into the shoulder, thigh, or buttocks as needed for anaphylaxis.    , Disp: , Rfl:   ?  fluconazole (DIFLUCAN) 150 MG tablet, Take one pill now.  Repeat in 72 hours.  If symptoms return, refills available for two times.  If symptoms not improved, return to clinic., Disp: , Rfl:   ?  lamoTRIgine (LAMICTAL) 200 MG tablet, Take 200 mg by mouth 2 (two) times a day.    , Disp: , Rfl:   ?  levonorgestrel (MIRENA) 20 mcg/24 hr (5 years) IUD, 1 Device by Intrauterine route., Disp: , Rfl:   ?  miscellaneous medical supply ([employer] SHARPS WALL [employer]IN[employer]) Misc, CPAP for home use at pressure of 9  Heated humidifier x1, Humidifier chamber x1, Heated tubing x1, Full face mask with cushion x1, Headgear x1, Filters: Disposable x1 pack, Reusable x1pk, Length of Need: 99 months, Frequency of use: Daily, Disp: , Rfl:   ?  nitroglycerin (NITROSTAT) 0.4 MG SL tablet, Place 0.4 mg under the tongue every 5 (five) minutes as needed for chest pain.    , Disp: , Rfl:   ?  omega 3-dha-epa-fish oil 183.3 mg-75 mg -91.6 mg-306 mg cap, Take 1 capsule by mouth daily., Disp: , Rfl: 0  ?  omeprazole (PRILOSEC) 40 MG capsule, Take 1 capsule (40 mg total) by mouth 2 (two) times a day., Disp: 120 capsule, Rfl: 9  ?  pediatric multivitamin (FLINTSTONES) Chew chewable tablet, Chew 1 tablet 2 (two) times a day., Disp: , Rfl: 0  ?  tablet cutter Misc, CPAP, heated humidifier, mask, headgear, filters and heated tubing. For home use. Pressure: 10   Length of Need: 99, Disp: , Rfl:   ?  VITAMIN B COMPLEX ORAL, Take 1 tablet by mouth daily., Disp: , Rfl:   ?  amoxicillin-clavulanate (AUGMENTIN) 875-125 mg per tablet, Take 1 tablet by mouth 2 (two) times a day., Disp: 6 tablet, Rfl: 0    Plan:OK to decrease calcium to one daily. Continue vitamins otherwise as is. Continue walking. Adding strength training will help with bone density, metabolism and strength. Continue PPI indefinitely    Return in about 3 months  "(around 10/31/2019). DEXA at 2 yrs post op.    Bariatric Surgery Review     Interim History/LifeChanges: 91# down. Had surgery with Dr. Connor for a perforated gastric ulcer almost 4 months post op. She continued ASA 81mg and d/c'd PPI. Stress high with mother on hospice for anal cancer.     Patient Concerns: questions. Re: Ca++, krill oil, biotin,   Appetite (1-10): OK  GERD: no     Medication changes: back on PPI off of b-blocker    Vitamin Intake:   B-12   SL   MVI  2 Marinette's with iron   Vitamin D  5,000   Calcium   ca++ [id number]     Other  biotin, krill              LABS: \"Reviewed  Excellent  Nausea no  Vomiting no  Constipation yes  Diarrhea no  Rashes no  Hair Loss yes  Calf tenderness no  Breathing difficulty no  Reactive Hypoglycemia yes  Light Headedness no   Moods great    12 point ROS as above and otherwise negative      Habits:  Alcohol: no  Tobacco: no  Caffeine 2c/d  NSAIDS ASA daily  Exercise Routine: walking, 5X/wk for 15-20 min  3 meals/day yes  Protein first yes  60+ grams/day  Water Separate from meals yes  Calorie Containing Beverages no  Restaurant eating/wk 4  Sleeping 7-10  Stress high  CPAP: consistently  Contraception: TL    Social History     Social History     Socioeconomic History   ? Marital status:      Spouse name: Not on file   ? Number of children: Not on file   ? Years of education: Not on file   ? Highest education level: Not on file   Occupational History   ? Not on file   Social Needs   ? Financial resource strain: Not on file   ? Food insecurity:     Worry: Not on file     Inability: Not on file   ? Transportation needs:     Medical: Not on file     Non-medical: Not on file   Tobacco Use   ? Smoking status: Never Smoker   ? Smokeless tobacco: Never Used   Substance and Sexual Activity   ? Alcohol use: No     Frequency: Never   ? Drug use: No   ? Sexual activity: Yes     Partners: Male     Birth control/protection: IUD   Lifestyle   ? Physical activity:     Days " per week: Not on file     Minutes per session: Not on file   ? Stress: Not on file   Relationships   ? Social connections:     Talks on phone: Not on file     Gets together: Not on file     Attends Sabianist service: Not on file     Active member of club or organization: Not on file     Attends meetings of clubs or organizations: Not on file     Relationship status: Not on file   ? Intimate partner violence:     Fear of current or ex partner: Not on file     Emotionally abused: Not on file     Physically abused: Not on file     Forced sexual activity: Not on file   Other Topics Concern   ? Not on file   Social History Narrative    , 2 children,     Working FT [employer]       Past Medical History     Past Medical History:   Diagnosis Date   ? Anxiety    ? Bipolar disorder (H)    ? Cardiomyopathy (H)    ? Carpal tunnel syndrome    ? Coronary artery disease 04/01/2013    2 stents   ? Coronary atherosclerosis    ? Depression    ? Heart attack (H)    ? Heartburn    ? History of anesthesia complications    ? Hyperlipidemia    ? Hypertension    ? Migraine    ? Morbid obesity with BMI of 40.0-44.9, adult (H)    ? Myocardial infarction (H) 20013   ? Obsessive compulsive disorder    ? Plantar fasciitis    ? PONV (postoperative nausea and vomiting)    ? Sleep apnea     cpap   ? Stress incontinence      Problem List     Patient Active Problem List   Diagnosis   ? Bipolar disorder (H)   ? Heartburn   ? Sleep apnea   ? Urinary incontinence   ? Coronary artery disease   ? Hypertension   ? Myocardial infarction (H)   ? Migraine   ? Knee pain   ? Plantar fasciitis   ? Morbid obesity with BMI of 40.0-44.9, adult (H)   ? Morbid obesity (H)   ? Morbid obesity due to excess calories (H)   ? Acute post-operative pain   ? Intra-abdominal free air of unknown etiology   ? Free intraperitoneal air   ? Gastrojejunal ulcer with perforation (H)     Medications     Current Outpatient Medications   Medication Sig Note   ?  ALPRAZolam (XANAX) 0.25 MG tablet Take 0.25-0.5 mg by mouth 2 (two) times a day as needed for anxiety.           ? aspirin 81 MG EC tablet Take 1 tablet (81 mg total) by mouth daily with supper.    ? atorvastatin (LIPITOR) 40 MG tablet Take 1 tablet (40 mg total) by mouth at bedtime.    ? calcium citrate-vitamin D (CITRACAL+D) 315-200 mg-unit per tablet Take 2 tablets by mouth daily.    ? cholecalciferol, vitamin D3, 2,000 unit Tab Take 1 tablet (2,000 Units total) by mouth daily.    ? cyanocobalamin, vitamin B-12, 1,000 mcg Subl Place 1,000 mcg under the tongue daily.    ? cyclobenzaprine (FLEXERIL) 10 MG tablet Take 5-10 mg by mouth every 8 (eight) hours as needed (spasms).           ? DULoxetine (CYMBALTA) 60 MG capsule Take 120 mg by mouth daily.           ? EPINEPHrine (EPIPEN 2-YANA) 0.3 mg/0.3 mL injection Inject 0.3 mg into the shoulder, thigh, or buttocks as needed for anaphylaxis.           ? fluconazole (DIFLUCAN) 150 MG tablet Take one pill now.  Repeat in 72 hours.  If symptoms return, refills available for two times.  If symptoms not improved, return to clinic.    ? lamoTRIgine (LAMICTAL) 200 MG tablet Take 200 mg by mouth 2 (two) times a day.           ? levonorgestrel (MIRENA) 20 mcg/24 hr (5 years) IUD 1 Device by Intrauterine route. 5/16/2018: [facility] Sig: Inject 1 Device intrauterine every 5 years.   ? miscellaneous medical supply ([employer] SHARPS WALL CABINET) Misc CPAP for home use at pressure of 9  Heated humidifier x1, Humidifier chamber x1, Heated tubing x1, Full face mask with cushion x1, Headgear x1, Filters: Disposable x1 pack, Reusable x1pk, Length of Need: 99 months, Frequency of use: Daily    ? nitroglycerin (NITROSTAT) 0.4 MG SL tablet Place 0.4 mg under the tongue every 5 (five) minutes as needed for chest pain.           ? omega 3-dha-epa-fish oil 183.3 mg-75 mg -91.6 mg-306 mg cap Take 1 capsule by mouth daily.    ?  "omeprazole (PRILOSEC) 40 MG capsule Take 1 capsule (40 mg total) by mouth 2 (two) times a day.    ? pediatric multivitamin (FLINTSTONES) Chew chewable tablet Chew 1 tablet 2 (two) times a day.    ? tablet cutter Misc CPAP, heated humidifier, mask, headgear, filters and heated tubing. For home use. Pressure: 10   Length of Need: 99 5/16/2018: Received from: [facility]   ? VITAMIN B COMPLEX ORAL Take 1 tablet by mouth daily.    ? amoxicillin-clavulanate (AUGMENTIN) 875-125 mg per tablet Take 1 tablet by mouth 2 (two) times a day.      Surgical History     Past Surgical History  She has a past surgical history that includes Dawson tooth extraction; little toe surgery; Tubal ligation (2004); Cholecystectomy (2010); Reduction mammaplasty (1998); Breast biopsy (2012); Coronary angioplasty with stent (2013); Cystocele repair; Cardiac surgery; Hysteroscopy; pr lap gastric bypass/wandy-en-y (N/A, 1/29/2019); and pr lap,diagnostic abdomen (N/A, 5/21/2019).    Objective-Exam     Constitutional:  /70   Pulse 92   Resp 14   Ht 5' 10\" (1.778 m)   Wt 218 lb 6.4 oz (99.1 kg)   SpO2 98%   BMI 31.34 kg/m    Height: 5' 10\" (1.778 m) (7/31/2019  3:49 PM)  Initial Weight: 309 lbs (4/30/2019  2:00 PM)  Weight: 218 lb 6.4 oz (99.1 kg) (7/31/2019  3:49 PM)  Weight loss from initial: 69.9 (4/30/2019  2:00 PM)  % Weight loss: 22.62 % (4/30/2019  2:00 PM)  BMI (Calculated): 31.3 (7/31/2019  3:49 PM)  SpO2: 98 % (7/31/2019  3:49 PM)  Waist Circumference (In): 38.7 Inches (7/31/2019  3:49 PM)  Hip Circumference (In): 45.5 Inches (7/31/2019  3:49 PM)  Neck Circumference (In): 13 Inches (7/31/2019  3:49 PM)    General:  Pleasant and in no acute distress   Eyes:  EOMI  ENT:  Airway 1+  Moist mucous membranes  Neck:  Supple, No LAD, No thyromegaly, No carotid bruits appreciated  Respiratory: Normal respiratory effort, no cough, wheezes or crackles  CV:  Regular rate and Rhythm,no murmurs, pulses 2+, no " calf tenderness, no LE edema  Gastrointestinal: Abdomen NT/ND, BS+  Musculoskeletal: muscle mass WNL  Skin: color tan hair full, incisions nicely healed  Neurological: No tremor, normal gait  Psychiatric: alert and oriented X3, mood and affect normal    Counseling     We reviewed the important post op bariatric recommendations:  -eating 3 meals daily  -eating protein first, getting >60gm protein daily  -eating slowly, chewing food well  -avoiding/limiting calorie containing beverages  -drinking water 15-30 minutes before or after meals  -choosing wheat, not white with breads, crackers, pastas, noemy, bagels, tortillas, rice  -limiting restaurant or cafeteria eating to twice a week or less    We discussed the importance of restorative sleep and stress management in maintaining a healthy weight.  We discussed the National Weight Control Registry healthy weight maintenance strategies and ways to optimize metabolism.  We discussed the importance of physical activity including cardiovascular and strength training in maintaining a healthier weight.    We discussed the importance of life-long vitamin supplementation and life-long  follow-up.    Kristine was reminded that, to avoid marginal ulcers she should avoid tobacco at all, alcohol in excess, caffeine in excess, and NSAIDS (unless indicated for cardioprotection or othewise and opposed by a PPI).    Sherly Zamora MD, [facility].  7/31/2019  4:04 PM             30 minutes spent with patient. >50% in counseling and coordination of care.

## 2021-06-28 NOTE — PROGRESS NOTES
Progress Notes by Sherly Hickey MD at 2/6/2020  8:30 AM     Author: Sherly Hickey MD Service: -- Author Type: Physician    Filed: 2/6/2020  9:08 AM Encounter Date: 2/6/2020 Status: Signed    : Sherly Hickey MD (Physician)       Bariatric Follow Up Visit with a History of Previous Bariatric Surgery     Date of visit: 2/6/2020  Physician: Sherly Zamora MD  Primary Care Provider:  Lilly De León DO Lynn M Dahn   49 y.o.  female    Date of Surgery: 1/29/2019  Initial Weight: 309#  Initial BMI: 44.34  Today's Weight:   Wt Readings from Last 1 Encounters:   02/06/20 213 lb (96.6 kg)     Body mass index is 30.56 kg/m .      Assessment and Plan     Assessment: Kristine is a 49 y.o. year old female who is 1 yr s/p  Teri en Y Gastric Bypass with Dr. Zhane Scott feels as if she has achieved the goals she hoped to accomplish through bariatric surgery and weight loss.    Encounter Diagnosis   Name Primary?   ? Postoperative malabsorption Yes         Current Outpatient Medications:   ?  ALPRAZolam (XANAX) 0.25 MG tablet, Take 0.25-0.5 mg by mouth 2 (two) times a day as needed for anxiety.    , Disp: , Rfl:   ?  aspirin 81 MG EC tablet, Take 1 tablet (81 mg total) by mouth daily with supper., Disp: , Rfl: 0  ?  atorvastatin (LIPITOR) 40 MG tablet, Take 1 tablet (40 mg total) by mouth at bedtime., Disp: , Rfl: 0  ?  calcium citrate-vitamin D (CITRACAL+D) 315-200 mg-unit per tablet, Take 2 tablets by mouth daily., Disp: , Rfl: 0  ?  cholecalciferol, vitamin D3, 2,000 unit Tab, Take 1 tablet (2,000 Units total) by mouth daily., Disp: , Rfl: 0  ?  cyanocobalamin, vitamin B-12, 1,000 mcg Subl, Place 1,000 mcg under the tongue daily., Disp: , Rfl:   ?  DULoxetine (CYMBALTA) 60 MG capsule, Take 120 mg by mouth daily.    , Disp: , Rfl:   ?  EPINEPHrine (EPIPEN 2-YANA) 0.3 mg/0.3 mL injection, Inject 0.3 mg into the shoulder, thigh, or buttocks as needed for anaphylaxis.    , Disp: , Rfl:   ?   "lamoTRIgine (LAMICTAL) 200 MG tablet, Take 200 mg by mouth 2 (two) times a day.    , Disp: , Rfl:   ?  levonorgestrel (MIRENA) 20 mcg/24 hr (5 years) IUD, 1 Device by Intrauterine route., Disp: , Rfl:   ?  miscellaneous medical supply ([employer] SHAR[employer] WALL [employer]IN[employer]) Misc, CPAP for home use at pressure of 9  Heated humidifier x1, Humidifier chamber x1, Heated tubing x1, Full face mask with cushion x1, Headgear x1, Filters: Disposable x1 pack, Reusable x1pk, Length of Need: 99 months, Frequency of use: Daily, Disp: , Rfl:   ?  nitroglycerin (NITROSTAT) 0.4 MG SL tablet, Place 0.4 mg under the tongue every 5 (five) minutes as needed for chest pain.    , Disp: , Rfl:   ?  omega 3-dha-epa-fish oil 183.3 mg-75 mg -91.6 mg-306 mg cap, Take 1 capsule by mouth daily., Disp: , Rfl: 0  ?  omeprazole (PRILOSEC) 40 MG capsule, Take 1 capsule (40 mg total) by mouth 2 (two) times a day., Disp: 120 capsule, Rfl: 9  ?  pediatric multivitamin (FLINTSTONES) Chew chewable tablet, Chew 1 tablet 2 (two) times a day., Disp: , Rfl: 0  ?  tablet cutter Misc, CPAP, heated humidifier, mask, headgear, filters and heated tubing. For home use. Pressure: 10   Length of Need: 99, Disp: , Rfl:   ?  traZODone (DESYREL) 50 MG tablet, Take 1 tablet by mouth at bedtime., Disp: , Rfl:   ?  VITAMIN B COMPLEX ORAL, Take 1 tablet by mouth daily., Disp: , Rfl:     Plan: Plans to add exercise back by walking skyways and going to the gym once a week. Continue vitamins with consistency. 18 mo f/u    Return in about 6 months (around 8/6/2020).    Bariatric Surgery Review     Interim History/LifeChanges: maintaining a 97# weight loss. Metabolics excellent. Tough winter.     Patient Concerns: winter  Appetite (1-10): OK  GERD: on PPI     Medication changes: trazodone for sleep    Vitamin Intake:   B-12   SL   MVI  Flintsone's   Vitamin D  5,000   Calcium   citrate     Other  biotin              LABS: \"Reviewed  excellent  Nausea no  Vomiting no  Constipation " managed  Diarrhea no  Rashes managed  Hair Loss growing back  Calf tenderness no  Breathing difficulty no  Reactive Hypoglycemia yes  Light Headedness yes   Moods stable    12 point ROS as above and otherwise negative      Habits:  Alcohol: none  Tobacco: no  Caffeine 1/d lg  NSAIDS no  Exercise Routine: belongs to the gym. Plan is to walk the skyway and go to the gym one day a week  3 meals/day yes  Protein first yes  60+grams/day  Water Separate from meals yes  Calorie Containing Beverages no  Restaurant eating/wk <1  Sleeping OK with trazodone  Stress moderate  CPAP: NA  Contraception: PM    Social History     Social History     Socioeconomic History   ? Marital status:      Spouse name: Not on file   ? Number of children: Not on file   ? Years of education: Not on file   ? Highest education level: Not on file   Occupational History   ? Not on file   Social Needs   ? Financial resource strain: Not on file   ? Food insecurity:     Worry: Not on file     Inability: Not on file   ? Transportation needs:     Medical: Not on file     Non-medical: Not on file   Tobacco Use   ? Smoking status: Never Smoker   ? Smokeless tobacco: Never Used   Substance and Sexual Activity   ? Alcohol use: No     Frequency: Never   ? Drug use: No   ? Sexual activity: Yes     Partners: Male     Birth control/protection: I.U.D.   Lifestyle   ? Physical activity:     Days per week: Not on file     Minutes per session: Not on file   ? Stress: Not on file   Relationships   ? Social connections:     Talks on phone: Not on file     Gets together: Not on file     Attends Oriental orthodox service: Not on file     Active member of club or organization: Not on file     Attends meetings of clubs or organizations: Not on file     Relationship status: Not on file   ? Intimate partner violence:     Fear of current or ex partner: Not on file     Emotionally abused: Not on file     Physically abused: Not on file     Forced sexual activity: Not on file    Other Topics Concern   ? Not on file   Social History Narrative    , 2 children,     Working FT [employer]       Past Medical History     Past Medical History:   Diagnosis Date   ? Anxiety    ? Bipolar disorder (H)    ? Cardiomyopathy (H)    ? Carpal tunnel syndrome    ? Coronary artery disease 04/01/2013    2 stents   ? Coronary atherosclerosis    ? Depression    ? Heart attack (H)    ? Heartburn    ? History of anesthesia complications    ? Hyperlipidemia    ? Hypertension    ? Migraine    ? Morbid obesity with BMI of 40.0-44.9, adult (H)    ? Myocardial infarction (H) 20013   ? Obsessive compulsive disorder    ? Plantar fasciitis    ? PONV (postoperative nausea and vomiting)    ? Sleep apnea     cpap   ? Stress incontinence      Problem List     Patient Active Problem List   Diagnosis   ? Bipolar disorder (H)   ? Heartburn   ? Sleep apnea   ? Urinary incontinence   ? Coronary artery disease   ? Hypertension   ? Myocardial infarction (H)   ? Migraine   ? Knee pain   ? Plantar fasciitis   ? Morbid obesity with BMI of 40.0-44.9, adult (H)   ? Morbid obesity (H)   ? Morbid obesity due to excess calories (H)   ? Acute post-operative pain   ? Intra-abdominal free air of unknown etiology   ? Free intraperitoneal air   ? Gastrojejunal ulcer with perforation (H)   ? Colon cancer screening   ? Esophageal reflux   ? Female stress incontinence   ? Gastrointestinal problem   ? Obsessive-compulsive disorder   ? Mixed hyperlipidemia   ? Moderate episode of recurrent major depressive disorder (H)   ? Stented coronary artery     Medications     Current Outpatient Medications   Medication Sig Note   ? ALPRAZolam (XANAX) 0.25 MG tablet Take 0.25-0.5 mg by mouth 2 (two) times a day as needed for anxiety.           ? aspirin 81 MG EC tablet Take 1 tablet (81 mg total) by mouth daily with supper.    ? atorvastatin (LIPITOR) 40 MG tablet Take 1 tablet (40 mg total) by mouth at bedtime.    ? calcium citrate-vitamin D  (CITRACAL+D) 315-200 mg-unit per tablet Take 2 tablets by mouth daily.    ? cholecalciferol, vitamin D3, 2,000 unit Tab Take 1 tablet (2,000 Units total) by mouth daily.    ? cyanocobalamin, vitamin B-12, 1,000 mcg Subl Place 1,000 mcg under the tongue daily.    ? DULoxetine (CYMBALTA) 60 MG capsule Take 120 mg by mouth daily.           ? EPINEPHrine (EPIPEN 2-YANA) 0.3 mg/0.3 mL injection Inject 0.3 mg into the shoulder, thigh, or buttocks as needed for anaphylaxis.           ? lamoTRIgine (LAMICTAL) 200 MG tablet Take 200 mg by mouth 2 (two) times a day.           ? levonorgestrel (MIRENA) 20 mcg/24 hr (5 years) IUD 1 Device by Intrauterine route. 5/16/2018: Received from: [employer] Received Sig: Inject 1 Device intrauterine every 5 years.   ? miscellaneous medical supply ([employer]AR[employer] WALL CABIN[employer]) Misc CPAP for home use at pressure of 9  Heated humidifier x1, Humidifier chamber x1, Heated tubing x1, Full face mask with cushion x1, Headgear x1, Filters: Disposable x1 pack, Reusable x1pk, Length of Need: 99 months, Frequency of use: Daily    ? nitroglycerin (NITROSTAT) 0.4 MG SL tablet Place 0.4 mg under the tongue every 5 (five) minutes as needed for chest pain.           ? omega 3-dha-epa-fish oil 183.3 mg-75 mg -91.6 mg-306 mg cap Take 1 capsule by mouth daily.    ? omeprazole (PRILOSEC) 40 MG capsule Take 1 capsule (40 mg total) by mouth 2 (two) times a day.    ? pediatric multivitamin (FLINTSTONES) Chew chewable tablet Chew 1 tablet 2 (two) times a day.    ? tablet cutter Misc CPAP, heated humidifier, mask, headgear, filters and heated tubing. For home use. Pressure: 10   Length of Need: 99 5/16/2018: Received from: [employer]   ? traZODone (DESYREL) 50 MG tablet Take 1 tablet by mouth at bedtime.    ? VITAMIN B COMPLEX ORAL Take 1 tablet by mouth daily.      Surgical History     Past Surgical History  She has a past surgical history that  "includes [place] tooth extraction; little toe surgery; Tubal ligation (2004); Cholecystectomy (2010); Reduction mammaplasty (1998); Breast biopsy (2012); Coronary angioplasty with stent (2013); Cystocele repair; Cardiac surgery; Hysteroscopy; pr lap gastric bypass/wandy-en-y (N/A, 1/29/2019); and pr lap,diagnostic abdomen (N/A, 5/21/2019).    Objective-Exam     Constitutional:  /72   Pulse 78   Resp 18   Ht 5' 10\" (1.778 m)   Wt 213 lb (96.6 kg)   SpO2 98%   Breastfeeding No   BMI 30.56 kg/m    Height: 5' 10\" (1.778 m) (2/6/2020  8:27 AM)  Initial Weight: 309 lbs (2/6/2020  8:27 AM)  Weight: 213 lb (96.6 kg) (2/6/2020  8:27 AM)  Weight loss from initial: 96 (2/6/2020  8:27 AM)  % Weight loss: 31.07 % (2/6/2020  8:27 AM)  BMI (Calculated): 30.6 (2/6/2020  8:27 AM)  SpO2: 98 % (2/6/2020  8:27 AM)  Waist Circumference (In): 41 Inches (2/6/2020  8:27 AM)  Hip Circumference (In): 45 Inches (2/6/2020  8:27 AM)  Neck Circumference (In): 14 Inches (2/6/2020  8:27 AM)    General:  Pleasant and in no acute distress   Eyes:  EOMI  ENT:  Airway 1+  Moist mucous membranes  Neck:  Supple, No LAD, No thyromegaly, No carotid bruits appreciated  Respiratory: Normal respiratory effort, no cough, wheezes or crackles  CV:  Regular rate and Rhythm,no murmurs, pulses 2+, no calf tenderness, no LE edema  Gastrointestinal: Abdomen NT/ND, BS+  Musculoskeletal: muscle mass WNL  Skin: color pink hair pulled back, incisions nicely healed  Neurological: No tremor, normal gait  Psychiatric: alert and oriented X3, mood and affect normal    Counseling     We reviewed the important post op bariatric recommendations:  -eating 3 meals daily  -eating protein first, getting >60gm protein daily  -eating slowly, chewing food well  -avoiding/limiting calorie containing beverages  -drinking water 15-30 minutes before or after meals  -choosing wheat, not white with breads, crackers, pastas, noemy, bagels, tortillas, rice  -limiting restaurant or " cafeteria eating to twice a week or less    We discussed the importance of restorative sleep and stress management in maintaining a healthy weight.  We discussed the National Weight Control Registry healthy weight maintenance strategies and ways to optimize metabolism.  We discussed the importance of physical activity including cardiovascular and strength training in maintaining a healthier weight.    We discussed the importance of life-long vitamin supplementation and life-long  follow-up.    Kristine was reminded that, to avoid marginal ulcers she should avoid tobacco at all, alcohol in excess, caffeine in excess, and NSAIDS (unless indicated for cardioprotection or othewise and opposed by a PPI).    Sherly Zamora MD, FAAFP  [facility].  2/6/2020  8:53 AM             30 minutes spent with patient. >50% in counseling and coordination of care.

## 2021-06-30 ENCOUNTER — PATIENT OUTREACH (OUTPATIENT)
Dept: CARE COORDINATION | Facility: CLINIC | Age: 51
End: 2021-06-30

## 2021-06-30 NOTE — PROGRESS NOTES
Clinic Care Coordination Contact  [facility]/Voicemail     Clinical Data: Care Coordination Outreach  Outreach attempted x 1.  Left message on patient's voicemail with call back information and requested return call.  Plan: CHW will try to reach patient again in 10 business days.      - Continued therapy visits   - new needs, concerns, questions    Isadora REYES  Community Health Worker  [facility] Care Coordination  [facility]  [email or web address]   Office: [phone]

## 2021-07-03 NOTE — ADDENDUM NOTE
Addendum Note by Stephen Phelan MD at 1/29/2019 12:01 PM     Author: Stephen Phelan MD Service: -- Author Type: Physician    Filed: 1/29/2019 12:01 PM Date of Service: 1/29/2019 12:01 PM Status: Signed    : Stephen Phelan MD (Physician)       Addendum  created 01/29/19 1201 by Stephen Phelan MD    Intraprocedure Event edited

## 2021-07-19 NOTE — PROGRESS NOTES
Clinic Care Coordination Contact  [facility]/Voicemail    Clinical Data: Care Coordination Outreach  Outreach attempted x 2.  Left message on patient's voicemail with call back information and requested return call.  Plan: CHW will send unable to contact letter with contact information via mail. CHW will try to reach patient again in 1 month.    Isadora REYES  Community Health Worker  [facility] Care Coordination  [facility]  [email or web address]   Office: [phone]

## 2021-07-30 NOTE — PROGRESS NOTES
Clinic Care Coordination Contact    Patient has completed all goals with Clinic Care Coordination. Maintenance is approved.    Pt has continued to see behavioral health providers through [facility], but some no shows and late cancellations noted in Epic recently.     CHW to address care gaps and ACP documents at next outreach.     TIFF Rebollar   Social Work Clinic Care Coordinator   [facility]  [phone]  [email or web address]

## 2021-08-25 ENCOUNTER — PATIENT OUTREACH (OUTPATIENT)
Dept: NURSING | Facility: CLINIC | Age: 51
End: 2021-08-25
Payer: COMMERCIAL

## 2021-08-25 NOTE — LETTER
[facility] CARE COORDINATION  [facility]  [address] [place] [address]    September 1, 2021    Kristine Scott  [address] [place] [id number]    Dear Kristine,    Your Care Team congratulates you on your journey to maintain wellness. This document will help guide you on your journey to maintain a healthy lifestyle.  You can use this to help you overcome any barriers you may encounter.  If you should have any questions or concerns, you can contact the members of your Care Team or contact your Primary Care Clinic for assistance.     Health Maintenance  Health Maintenance Reviewed: Due/Overdue     Health Maintenance Due   Topic Date Due     PREVENTIVE CARE VISIT  Never done     ADVANCE CARE PLANNING  Never done     COVID-19 Vaccine (1) Never done     HIV SCREENING  Never done     HEPATITIS C SCREENING  Never done     PAP  Never done     INFLUENZA VACCINE (1) 09/01/2021       My Access Plan  Medical Emergency 911   Primary Clinic Line [facility] - [phone]   24 Hour Appointment Line [phone] or  [phone] ([phone]) (toll-free)   24 Hour Nurse Line [phone] (toll-free)   Preferred Urgent Care [facility], [phone]   Preferred Hospital [place], [place]  [phone]   Preferred Pharmacy [facility] - [place], [place] - [address] [place] [address]     Behavioral Health Crisis Line The National Suicide Prevention Lifeline at 1-899.733.9708 or 461     My Care Team Members  Patient Care Team       Relationship Specialty Notifications Start End    Noni Cuellar APRN CNP PCP - General Nurse Practitioner - Family  1/15/21     Phone: [phone] Fax: [phone] [address] [facility] [address]    Magnus Nielsen MD Assigned Pulmonology Provider   2/7/21     Phone: [phone] Fax: [phone]         [address] [facility] [address]    Noni Cuellar APRN CNP  Assigned PCP   4/4/21     Phone: [phone] Fax: [phone]         [address] [address] [facility] [address]              Goals        COMPLETED: Mental Health Management (pt-stated)       Goal Statement: I will manage my mental health  Date Goal set: 03/17/21  Barriers: depression; anxiety  Strengths: would like to get better  Date to Achieve By: 09/17/21  Patient expressed understanding of goal: yes  Action steps to achieve this goal:  1. I will get a Rule 25  2. I will see a therapist  3. I will see psychiatry                 Advance Care Plans/Directives Type:      We notice that you do not have an Advance Directive on file. Upon completion of your Health Care Directive, please bring a copy with you to your next office visit.     Honoring Choices    Advance Care Planning and Health Care Directives  When it comes to decisions about your health care, it s important that your voice is heard. You may not always be able to speak for yourself.    We encourage you to have discussions with your loved ones, cultural or spiritual leaders and health care providers about your goals, values, beliefs and choices.    We are a part of [employer] [place] , supporting and promoting the benefits of advance care planning conversations.    Our goals are to:    Help you make informed decisions about your healthcare choices and ensure that those choices are honored    Offer advance care planning discussions with trained staff    Ensure your choices are clearly defined, documented and available in your medical record    Translate your choices into medical orders as needed    Why is Advance Care Planning important?    Know what your health care choices are and decide what is right for you    An unexpected illness or injury could leave you unable to participate in important treatment decisions    When choices are left to others to decide that responsibility can be difficult and stressful    By discussing and outlining your  choices, your voice is heard in the care you want to receive    How can I learn more?  We offer free classes at multiple locations, days and times. Our trained facilitators will provide information and guide you through a Health Care Directive document. They can also review, notarize and add your document to your medical record.    Call [employer] at [phone] or toll free at [phone] for assistance.      It has been your Clinic Care Team's pleasure to work with you on your goals.    Regards,    Your Clinic Care Team

## 2021-09-01 ASSESSMENT — ACTIVITIES OF DAILY LIVING (ADL): DEPENDENT_IADLS:: INDEPENDENT

## 2021-09-01 NOTE — PROGRESS NOTES
Clinic Care Coordination Contact    Community Health Worker Follow Up    Care Gaps:     Health Maintenance Due   Topic Date Due     PREVENTIVE CARE VISIT  Never done     ADVANCE CARE PLANNING  Never done     COVID-19 Vaccine (1) Never done     HIV SCREENING  Never done     HEPATITIS C SCREENING  Never done     PAP  Never done     INFLUENZA VACCINE (1) 09/01/2021     Patient not interested in discussing at this time.    Goals: Completed.    Intervention and Education during outreach: Patient stated that she completed the Rule 25 and that she continues to go to psychiatry appts.  Patient stated she sees behavioral health providers [facility].  No future [facility] appts currently scheduled to confirm with patient.     CHW Plan:  Patient has completed all goals and has no new needs.   CC please review the chart, confirm if Graduation is approved and provide an Emergency plan.  Please reach out with any questions or delegations.  TY!    Isadora REYES  Community Health Worker  [facility]  Clinic Care Coordination  [facility]  [email or web address]   Office: [phone]

## 2021-09-01 NOTE — PROGRESS NOTES
Clinic Care Coordination Contact    Assessment: CHW Care Coordinator contacted patient for 2 month follow up. Patient has continued to follow the plan of care and assessment is negative for any new needs or concerns. Patient has completed all goals with Clinic Care Coordination. Graduation is approved.    Enrollment status: Graduated     Plan: No further outreaches at this time. Patient will continue to follow the plan of care. If new needs arise a new Care Coordination referral may be placed. [employer] sent graduation letter via [employer].     TIFF Rebollar   Social Work Clinic Care Coordinator   [facility]  [phone]  [email or web address]

## 2021-09-12 ENCOUNTER — HEALTH MAINTENANCE LETTER (OUTPATIENT)
Age: 51
End: 2021-09-12

## 2021-12-27 ENCOUNTER — OFFICE VISIT (OUTPATIENT)
Dept: FAMILY MEDICINE | Facility: CLINIC | Age: 51
End: 2021-12-27
Payer: COMMERCIAL

## 2021-12-27 VITALS
BODY MASS INDEX: 39.22 KG/M2 | HEIGHT: 70 IN | WEIGHT: 274 LBS | DIASTOLIC BLOOD PRESSURE: 76 MMHG | SYSTOLIC BLOOD PRESSURE: 104 MMHG | OXYGEN SATURATION: 96 % | TEMPERATURE: 97.8 F | HEART RATE: 88 BPM

## 2021-12-27 DIAGNOSIS — I10 ESSENTIAL HYPERTENSION: ICD-10-CM

## 2021-12-27 DIAGNOSIS — Z11.4 SCREENING FOR HIV (HUMAN IMMUNODEFICIENCY VIRUS): ICD-10-CM

## 2021-12-27 DIAGNOSIS — G47.33 OSA (OBSTRUCTIVE SLEEP APNEA): ICD-10-CM

## 2021-12-27 DIAGNOSIS — Z12.31 VISIT FOR SCREENING MAMMOGRAM: ICD-10-CM

## 2021-12-27 DIAGNOSIS — Z11.59 NEED FOR HEPATITIS C SCREENING TEST: ICD-10-CM

## 2021-12-27 DIAGNOSIS — Z00.00 ROUTINE GENERAL MEDICAL EXAMINATION AT A HEALTH CARE FACILITY: Primary | ICD-10-CM

## 2021-12-27 DIAGNOSIS — Z98.84 STATUS POST BARIATRIC SURGERY: ICD-10-CM

## 2021-12-27 DIAGNOSIS — E78.2 MIXED HYPERLIPIDEMIA: ICD-10-CM

## 2021-12-27 DIAGNOSIS — I25.2 HISTORY OF MI (MYOCARDIAL INFARCTION): ICD-10-CM

## 2021-12-27 DIAGNOSIS — Z91.030 ALLERGY TO HONEY BEE VENOM: ICD-10-CM

## 2021-12-27 LAB
ALBUMIN SERPL-MCNC: 3.5 G/DL (ref 3.4–5)
ALP SERPL-CCNC: 98 U/L (ref 40–150)
ALT SERPL W P-5'-P-CCNC: 37 U/L (ref 0–50)
ANION GAP SERPL CALCULATED.3IONS-SCNC: 2 MMOL/L (ref 3–14)
AST SERPL W P-5'-P-CCNC: 25 U/L (ref 0–45)
BILIRUB SERPL-MCNC: 0.6 MG/DL (ref 0.2–1.3)
BUN SERPL-MCNC: 11 MG/DL (ref 7–30)
CALCIUM SERPL-MCNC: 9.2 MG/DL (ref 8.5–10.1)
CHLORIDE BLD-SCNC: 107 MMOL/L (ref 94–109)
CHOLEST SERPL-MCNC: 141 MG/DL
CO2 SERPL-SCNC: 33 MMOL/L (ref 20–32)
CREAT SERPL-MCNC: 1.13 MG/DL (ref 0.52–1.04)
FASTING STATUS PATIENT QL REPORTED: YES
GFR SERPL CREATININE-BSD FRML MDRD: 58 ML/MIN/1.73M2
GLUCOSE BLD-MCNC: 97 MG/DL (ref 70–99)
HCV AB SERPL QL IA: NONREACTIVE
HDLC SERPL-MCNC: 76 MG/DL
HIV 1+2 AB+HIV1 P24 AG SERPL QL IA: NONREACTIVE
LDLC SERPL CALC-MCNC: 43 MG/DL
NONHDLC SERPL-MCNC: 65 MG/DL
POTASSIUM BLD-SCNC: 4.5 MMOL/L (ref 3.4–5.3)
PROT SERPL-MCNC: 7.4 G/DL (ref 6.8–8.8)
SODIUM SERPL-SCNC: 142 MMOL/L (ref 133–144)
TRIGL SERPL-MCNC: 110 MG/DL

## 2021-12-27 PROCEDURE — 36415 COLL VENOUS BLD VENIPUNCTURE: CPT | Performed by: NURSE PRACTITIONER

## 2021-12-27 PROCEDURE — 80053 COMPREHEN METABOLIC PANEL: CPT | Performed by: NURSE PRACTITIONER

## 2021-12-27 PROCEDURE — 86803 HEPATITIS C AB TEST: CPT | Performed by: NURSE PRACTITIONER

## 2021-12-27 PROCEDURE — 99396 PREV VISIT EST AGE 40-64: CPT | Performed by: NURSE PRACTITIONER

## 2021-12-27 PROCEDURE — 99214 OFFICE O/P EST MOD 30 MIN: CPT | Mod: 25 | Performed by: NURSE PRACTITIONER

## 2021-12-27 PROCEDURE — 87389 HIV-1 AG W/HIV-1&-2 AB AG IA: CPT | Performed by: NURSE PRACTITIONER

## 2021-12-27 PROCEDURE — 80061 LIPID PANEL: CPT | Performed by: NURSE PRACTITIONER

## 2021-12-27 RX ORDER — ATORVASTATIN CALCIUM 40 MG/1
40 TABLET, FILM COATED ORAL DAILY
Qty: 90 TABLET | Refills: 3 | Status: SHIPPED | OUTPATIENT
Start: 2021-12-27

## 2021-12-27 RX ORDER — EPINEPHRINE 0.3 MG/.3ML
INJECTION SUBCUTANEOUS
Qty: 2 EACH | Refills: 1 | Status: SHIPPED | OUTPATIENT
Start: 2021-12-27 | End: 2023-08-02

## 2021-12-27 ASSESSMENT — PATIENT HEALTH QUESTIONNAIRE - PHQ9: SUM OF ALL RESPONSES TO PHQ QUESTIONS 1-9: 11

## 2021-12-27 ASSESSMENT — ANXIETY QUESTIONNAIRES
3. WORRYING TOO MUCH ABOUT DIFFERENT THINGS: SEVERAL DAYS
GAD7 TOTAL SCORE: 10
5. BEING SO RESTLESS THAT IT IS HARD TO SIT STILL: MORE THAN HALF THE DAYS
7. FEELING AFRAID AS IF SOMETHING AWFUL MIGHT HAPPEN: SEVERAL DAYS
1. FEELING NERVOUS, ANXIOUS, OR ON EDGE: MORE THAN HALF THE DAYS
2. NOT BEING ABLE TO STOP OR CONTROL WORRYING: SEVERAL DAYS
6. BECOMING EASILY ANNOYED OR IRRITABLE: SEVERAL DAYS
4. TROUBLE RELAXING: MORE THAN HALF THE DAYS
IF YOU CHECKED OFF ANY PROBLEMS ON THIS QUESTIONNAIRE, HOW DIFFICULT HAVE THESE PROBLEMS MADE IT FOR YOU TO DO YOUR WORK, TAKE CARE OF THINGS AT HOME, OR GET ALONG WITH OTHER PEOPLE: SOMEWHAT DIFFICULT

## 2021-12-27 ASSESSMENT — ENCOUNTER SYMPTOMS
HEMATURIA: 0
PARESTHESIAS: 0
HEMATOCHEZIA: 0
DIZZINESS: 0
EYE PAIN: 0
NERVOUS/ANXIOUS: 1
MYALGIAS: 0
COUGH: 0
WEAKNESS: 0
CHILLS: 0
SHORTNESS OF BREATH: 0
FEVER: 0
NAUSEA: 0
BREAST MASS: 0
DIARRHEA: 0
PALPITATIONS: 0
SORE THROAT: 0
DYSURIA: 0
ABDOMINAL PAIN: 0
HEADACHES: 0
HEARTBURN: 0
FREQUENCY: 0
JOINT SWELLING: 0
ARTHRALGIAS: 0
CONSTIPATION: 0

## 2021-12-27 ASSESSMENT — MIFFLIN-ST. JEOR: SCORE: 1933.11

## 2021-12-27 ASSESSMENT — PAIN SCALES - GENERAL: PAINLEVEL: NO PAIN (0)

## 2021-12-27 NOTE — Clinical Note
Pap smear done on this date: 9/23/2020 (approximately), by this group: Care Everywhere, results were normal/negative.

## 2021-12-27 NOTE — PROGRESS NOTES
SUBJECTIVE:   CC: Kristine Scott is an 51 year old woman who presents for preventive health visit.       Patient has been advised of split billing requirements and indicates understanding: Yes  Patients psych medications are managed by her psychiatrist. She feels she is doing well right now. She is in need of refills for her atorvastatin and epi pen.     She mentions concerns with her CPAP. Her machine is broken and recalled. Her previous sleep medicine provider was through [facility]. She is currently using an old machine that is not working as well. She would like to get a new machine ordered and got frustrated with some of the barriers to getting this done. She would like assistance in getting a new CPAP machine ordered. She mentions that her number was 10.     Healthy Habits:     Getting at least 3 servings of Calcium per day:  NO    Bi-annual eye exam:  Yes    Dental care twice a year:  Yes    Sleep apnea or symptoms of sleep apnea:  Sleep apnea    Diet:  Regular (no restrictions)    Frequency of exercise:  None    Taking medications regularly:  Yes    Medication side effects:  None    PHQ-2 Total Score: 2    Additional concerns today:  Yes      Pap smear done on this date: 9/23/2020 (approximately), by this group: [facility] Everywhere, results were normal/negative.       Hyperlipidemia Follow-Up      Are you regularly taking any medication or supplement to lower your cholesterol?   Yes- atorvastatin    Are you having muscle aches or other side effects that you think could be caused by your cholesterol lowering medication?  No    Hypertension Follow-up      Do you check your blood pressure regularly outside of the clinic? No     Are you following a low salt diet? Yes    Are your blood pressures ever more than 140 on the top number (systolic) OR more   than 90 on the bottom number (diastolic), for example 140/90? No    Vascular Disease Follow-up      How often do you take nitroglycerin? Never    Do you take an aspirin  "every day? Yes    Depression and Anxiety Follow-Up    How are you doing with your depression since your last visit? Doing okay. Following with psych and taking medications as prescribed.     How are you doing with your anxiety since your last visit?  Same as depression. \"good right now\"    Are you having other symptoms that might be associated with depression or anxiety? No    Have you had a significant life event? No     Do you have any concerns with your use of alcohol or other drugs? No    Social History     Tobacco Use     Smoking status: Never Smoker     Smokeless tobacco: Never Used   Vaping Use     Vaping Use: Never used   Substance Use Topics     Alcohol use: No     Comment: a bottle of wine a day      Drug use: No     PHQ 3/16/2021 3/22/2021 12/27/2021   PHQ-9 Total Score 24 21 11   Q9: Thoughts of better off dead/self-harm past 2 weeks Several days Several days Not at all     DENICE-7 SCORE 3/16/2021 3/22/2021 12/27/2021   Total Score 16 16 10     Last PHQ-9 12/27/2021   1.  Little interest or pleasure in doing things 1   2.  Feeling down, depressed, or hopeless 1   3.  Trouble falling or staying asleep, or sleeping too much 2   4.  Feeling tired or having little energy 1   5.  Poor appetite or overeating 2   6.  Feeling bad about yourself 1   7.  Trouble concentrating 1   8.  Moving slowly or restless 2   Q9: Thoughts of better off dead/self-harm past 2 weeks 0   PHQ-9 Total Score 11   Difficulty at work, home, or with people Somewhat difficult     DENICE-7  12/27/2021   1. Feeling nervous, anxious, or on edge 2   2. Not being able to stop or control worrying 1   3. Worrying too much about different things 1   4. Trouble relaxing 2   5. Being so restless that it is hard to sit still 2   6. Becoming easily annoyed or irritable 1   7. Feeling afraid, as if something awful might happen 1   DENICE-7 Total Score 10   If you checked any problems, how difficult have they made it for you to do your work, take care of " things at home, or get along with other people? Somewhat difficult       Suicide Assessment Five-step Evaluation and Treatment (SAFE-T)      Today's PHQ-2 Score:   PHQ-2 ( 1999 [employer]) 12/27/2021   Q1: Little interest or pleasure in doing things 1   Q2: Feeling down, depressed or hopeless 1   PHQ-2 Score 2   PHQ-2 Total Score (12-17 Years)- Positive if 3 or more points; Administer PHQ-A if positive -   Q1: Little interest or pleasure in doing things Several days   Q2: Feeling down, depressed or hopeless Several days   PHQ-2 Score 2       Abuse: Current or Past (Physical, Sexual or Emotional) - No  Do you feel safe in your environment? Yes    Have you ever done Advance Care Planning? (For example, a Health Directive, POLST, or a discussion with a medical provider or your loved ones about your wishes): No, advance care planning information given to patient to review.  Patient plans to discuss their wishes with loved ones or provider.      Social History     Tobacco Use     Smoking status: Never Smoker     Smokeless tobacco: Never Used   Substance Use Topics     Alcohol use: No     Comment: a bottle of wine a day      If you drink alcohol do you typically have >3 drinks per day or >7 drinks per week? No    Alcohol Use 12/27/2021   Prescreen: >3 drinks/day or >7 drinks/week? No   Prescreen: >3 drinks/day or >7 drinks/week? -   No flowsheet data found.    Reviewed orders with patient.  Reviewed health maintenance and updated orders accordingly - Yes  Lab work is in process  BP Readings from Last 3 Encounters:   12/27/21 104/76   05/13/21 110/70   03/22/21 108/60    Wt Readings from Last 3 Encounters:   12/27/21 124.3 kg (274 lb)   05/13/21 112.9 kg (249 lb)   03/22/21 108.9 kg (240 lb)                  Patient Active Problem List   Diagnosis     Bipolar disorder (H)     Coronary atherosclerosis of native coronary artery     Esophageal reflux     Essential hypertension     Female stress incontinence     Gastrointestinal  problem     Gastrojejunal ulcer with perforation (H)     Heartburn     Internal hernia     Knee pain     Marginal ulcer     Migraine     Mixed hyperlipidemia     Moderate episode of recurrent major depressive disorder (H)     Morbid obesity (H)     Obsessive-compulsive disorder     Plantar fasciitis     Pneumoperitoneum     NAVA (obstructive sleep apnea)     Small bowel obstruction (H)     Stented coronary artery     DENICE (generalized anxiety disorder)     History of MI (myocardial infarction)     Upper back pain     Pulmonary nodules     Past Surgical History:   Procedure Laterality Date     ABDOMEN SURGERY       ANGIOPLASTY  2013    x 2     BIOPSY BREAST  2012     BREAST SURGERY      reduction     CARDIAC SURGERY       CHOLECYSTECTOMY       CHOLECYSTECTOMY  2010     CYSTOCELE REPAIR       GASTRECTOMY       GASTRIC BYPASS       HERNIA REPAIR       HYSTEROSCOPY DIAGNOSTIC       MAMMOPLASTY REDUCTION  1998     OTHER SURGICAL HISTORY      little toe surgery     [place] LAP GASTRIC BYPASS/FRANSISCA-EN-Y N/A 1/29/2019    Procedure: LAPAROSCOPIC FRANSISCA-EN Y, GASTRIC BYPASS;  Surgeon: Ab Connor MD;  Location: [facility];  Service: General     MN LAP,DIAGNOSTIC ABDOMEN N/A 5/21/2019    Procedure: LAPAROSCOPIC REPAIR OF PERFORATED MARGINAL ULCER;  Surgeon: Ab Connor MD;  Location: [facility];  Service: General     MN LAP,DIAGNOSTIC ABDOMEN N/A 5/7/2020    Procedure: LAPAROSCOPY AND CLOSURE OF INTERNAL HERNIA;  Surgeon: Ab Connor MD;  Location: [facility];  Service: General     FRANSISCA EN Y BOWEL  01/23/2019     STENT       STOMACH SURGERY      perferated ulcer     TOE SURGERY       TUBAL LIGATION       TUBAL LIGATION  2004     VASCULAR SURGERY       WISDOM TOOTH EXTRACTION         Social History     Tobacco Use     Smoking status: Never Smoker     Smokeless tobacco: Never Used   Substance Use Topics     Alcohol use: No     Comment: a bottle of wine a day      Family History   Problem  Relation Age of Onset     Cancer Mother      Depression Mother      Mental Illness Mother      Substance Abuse Paternal Grandmother      Substance Abuse Brother      Substance Abuse Sister      Substance Abuse Daughter      Depression Sister      Hypotension Mother      Anemia Mother      No Known Problems Sister      Obesity Brother      Sleep Apnea Brother      Hyperlipidemia Brother      Hypertension Brother      Depression Sister      Obesity Sister          Current Outpatient Medications   Medication Sig Dispense Refill     acetaminophen (TYLENOL) 500 MG tablet Take 1,000 mg by mouth       ALPRAZolam (XANAX) 0.25 MG tablet        ARIPiprazole (ABILIFY) 10 MG tablet Take 1 tablet (10 mg) by mouth daily 90 tablet 0     aspirin (ASA) 81 MG EC tablet Take 81 mg by mouth       atorvastatin (LIPITOR) 40 MG tablet Take 1 tablet (40 mg) by mouth daily 90 tablet 3     buPROPion (WELLBUTRIN XL) 300 MG 24 hr tablet Take 1 tablet (300 mg) by mouth every morning 90 tablet 1     calcium citrate-vitamin D (CITRACAL) 315-250 MG-UNIT TABS per tablet Take by mouth 2 times daily       cholecalciferol 50 MCG (2000 UT) tablet Take 2,000 Units by mouth       cyclobenzaprine (FLEXERIL) 5 MG tablet Take 1-2 tablets (5-10 mg) by mouth 3 times daily as needed for muscle spasms 20 tablet 3     DULoxetine (CYMBALTA) 60 MG capsule Take 120 mg by mouth       EPINEPHrine (ANY BX GENERIC EQUIV) 0.3 MG/0.3ML injection 2-pack Inject 0.3 mg intramuscular one time if needed for tongue swelling, SOB. Go to ER immediately after use for possible rebound 2 each 1     lamoTRIgine (LAMICTAL) 200 MG tablet Take 200 mg by mouth       levonorgestrel (MIRENA) 20 MCG/24HR IUD 1 each by Intrauterine route       nitroGLYcerin (NITROSTAT) 0.4 MG sublingual tablet Place 0.4 mg under the tongue       Omega-3 Fatty Acids (FISH OIL PEARLS) 183.33 MG CAPS Take 1 capsule by mouth       omeprazole (PRILOSEC) 40 MG DR capsule        Respiratory Therapy Supplies  "([facility] CPAP FILTER) MISC CPAP for home use at pressure of 10cmw.  Heated humidifier, Heated humidifier x1 q5 yr , Humidifier chamber x1 q6 mo, Full face mask x1 q3 mo, Full face cushion q2 mo, Heated tubing x1 q3 mo, Headgear x1 q6 mo, Chinstrap x1 q6 mo, Filters: Disposable x2 q mo, Non-disposable filters x1 q6 mo, Length of Need: 99 months, Frequency of use: Daily.       Sharps Container (SHARPS-A-GATOR LOCKING BRACKET) MISC CPAP for home use at pressure of 9  Heated humidifier x1, Humidifier chamber x1, Heated tubing x1, Full face mask with cushion x1, Headgear x1, Filters: Disposable x1 pack, Reusable x1pk, Length of Need: 99 months, Frequency of use: Daily       traZODone (DESYREL) 50 MG tablet Take 50 mg by mouth       vitamin B complex with vitamin C (VITAMIN  B COMPLEX) tablet Take 1 tablet by mouth       Allergies   Allergen Reactions     Bee Venom Anaphylaxis, Angioedema and Other (See Comments)     Comment: , Description:   Throat swells up       Codeine Other (See Comments)     Paranoia  Comment: \"makes me loopy\"  Description: \"makes me loopy\", moderate to severe reaction       Hydrocodone Other (See Comments)     Other reaction(s): Intolerance-Can't Take  Comment: paranoia, Description:   Comment: paranoia, Description:   Comment: parinoia       Seasonal Allergies        Breast Cancer Screening:    FHS-7:   Breast CA Risk Assessment (FHS-7) 12/27/2021   Did any of your first-degree relatives have breast or ovarian cancer? No   Did any of your relatives have bilateral breast cancer? No   Did any man in your family have breast cancer? No   Did any woman in your family have breast and ovarian cancer? No   Did any woman in your family have breast cancer before age 50 y? No   Do you have 2 or more relatives with breast and/or ovarian cancer? No   Do you have 2 or more relatives with breast and/or bowel cancer? Yes       Mammogram Screening: Recommended annual mammography  Pertinent mammograms " are reviewed under the imaging tab.    History of abnormal Pap smear: NO - age 30-65 PAP every 5 years with negative HPV co-testing recommended     Reviewed and updated as needed this visit by clinical staff  Tobacco  Allergies  Meds  Problems  Med Hx  Surg Hx  Fam Hx  Soc Hx         Reviewed and updated as needed this visit by Provider  Tobacco  Allergies  Meds  Problems  Med Hx  Surg Hx  Fam Hx        Past Medical History:   Diagnosis Date     Anxiety      Bipolar disorder (H)      Bipolar disorder (H)      Cardiomyopathy (H)      Carpal tunnel syndrome      Coronary artery disease      Coronary artery disease 04/01/2013    2 stents     Coronary atherosclerosis      Depression      Depressive disorder      Heart attack (H) 2013     Heart attack (H)      Heart disease      Heartburn      History of anesthesia complications      Hyperlipidemia      Hypertension      Hypertension      Migraine      Morbid obesity with BMI of 40.0-44.9, adult (H)      Myocardial infarction (H) 20013     Obsessive compulsive disorder      OCD (obsessive compulsive disorder)      NAVA on CPAP      Plantar fasciitis      PONV (postoperative nausea and vomiting)      Sleep apnea     cpap     Stress incontinence       Past Surgical History:   Procedure Laterality Date     ABDOMEN SURGERY       ANGIOPLASTY  2013    x 2     BIOPSY BREAST  2012     BREAST SURGERY      reduction     CARDIAC SURGERY       CHOLECYSTECTOMY       CHOLECYSTECTOMY  2010     CYSTOCELE REPAIR       GASTRECTOMY       GASTRIC BYPASS       HERNIA REPAIR       HYSTEROSCOPY DIAGNOSTIC       MAMMOPLASTY REDUCTION  1998     OTHER SURGICAL HISTORY      little toe surgery     MN LAP GASTRIC BYPASS/FRANSISCA-EN-Y N/A 1/29/2019    Procedure: LAPAROSCOPIC FRANSISCA-EN Y, GASTRIC BYPASS;  Surgeon: Ab Connor MD;  Location: [facility];  Service: General     MN LAP,DIAGNOSTIC ABDOMEN N/A 5/21/2019    Procedure: LAPAROSCOPIC REPAIR OF PERFORATED MARGINAL ULCER;   "Surgeon: Ab Connor MD;  Location: [facility];  Service: General     [place] LAP,DIAGNOSTIC ABDOMEN N/A 5/7/2020    Procedure: LAPAROSCOPY AND CLOSURE OF INTERNAL HERNIA;  Surgeon: Ab Connor MD;  Location: [facility];  Service: General     FRANSISCA EN Y BOWEL  01/23/2019     STENT       STOMACH SURGERY      perferated ulcer     TOE SURGERY       TUBAL LIGATION       TUBAL LIGATION  2004     VASCULAR SURGERY       WISDOM TOOTH EXTRACTION       OB History   No obstetric history on file.       Review of Systems   Constitutional: Negative for chills and fever.   HENT: Negative for congestion, ear pain, hearing loss and sore throat.    Eyes: Negative for pain and visual disturbance.   Respiratory: Negative for cough and shortness of breath.    Cardiovascular: Negative for chest pain, palpitations and peripheral edema.   Gastrointestinal: Negative for abdominal pain, constipation, diarrhea, heartburn, hematochezia and nausea.   Breasts:  Negative for tenderness, breast mass and discharge.   Genitourinary: Negative for dysuria, frequency, genital sores, hematuria, pelvic pain, urgency, vaginal bleeding and vaginal discharge.   Musculoskeletal: Negative for arthralgias, joint swelling and myalgias.   Skin: Negative for rash.   Neurological: Negative for dizziness, weakness, headaches and paresthesias.   Psychiatric/Behavioral: Positive for mood changes. The patient is nervous/anxious.         OBJECTIVE:   /76   Pulse 88   Temp 97.8  F (36.6  C) (Tympanic)   Ht 1.778 m (5' 10\")   Wt 124.3 kg (274 lb)   SpO2 96%   BMI 39.31 kg/m       Physical Exam  GENERAL: healthy, alert and no distress  EYES: Eyes grossly normal to inspection, PERRL and conjunctivae and sclerae normal  HENT: ear canals and TM's normal, nose and mouth without ulcers or lesions  NECK: no adenopathy, no asymmetry, masses, or scars and thyroid normal to palpation  RESP: lungs clear to auscultation - no rales, rhonchi or " wheezes  BREAST: declined exam  CV: regular rate and rhythm, normal S1 S2, no S3 or S4, no murmur, click or rub, no peripheral edema and peripheral pulses strong  ABDOMEN: soft, nontender, no hepatosplenomegaly, no masses and bowel sounds normal  MS: no gross musculoskeletal defects noted, no edema  SKIN: no suspicious lesions or rashes  NEURO: Normal strength and tone, mentation intact and speech normal  PSYCH: mentation appears normal, affect normal/bright    ASSESSMENT/PLAN:     1. Routine general medical examination at a health care facility  Healthy female exam completed today. Discussed lifestyle modifications including weight loss/ management, eating a balanced diet, and getting regular cardiovascular exercise. Discussed self breast exams and how to complete these. Pap Smear up to date, sent to abstract. Labs reviewed/ ordered. Plan yearly annual physicals or sooner as needed.      2. NAVA (obstructive sleep apnea)  Needing a new CPAP machine.  Referral for E consult placed to see if this can be completed without an in person visit.  Additional referral placed for in person if needed.  - SLEEP EVALUATION & MANAGEMENT REFERRAL - ADULT -; Future  - E-CONSULT TO SLEEP MEDICINE (ADULT OUTPT PROVIDER TO SPECIALIST WRITTEN QUESTION & RESPONSE)    3. Essential hypertension  Secondary to previous MI.  Blood pressure is well controlled.  - Comprehensive metabolic panel (BMP + Alb, Alk Phos, ALT, AST, Total. Bili, TP); Future  - Comprehensive metabolic panel (BMP + Alb, Alk Phos, ALT, AST, Total. Bili, TP)    4. Mixed hyperlipidemia  Refilled medications today will check.  Has been stable.  - atorvastatin (LIPITOR) 40 MG tablet; Take 1 tablet (40 mg) by mouth daily  Dispense: 90 tablet; Refill: 3  - Lipid panel reflex to direct LDL Fasting; Future  - Lipid panel reflex to direct LDL Fasting    5. History of MI (myocardial infarction)  Continue with medications as prescribed.  - Comprehensive metabolic panel (BMP + Alb,  "Alk Phos, ALT, AST, Total. Bili, TP); Future  - Comprehensive metabolic panel (BMP + Alb, Alk Phos, ALT, AST, Total. Bili, TP)    6. Allergy to honey bee venom  Refilled  - EPINEPHrine (ANY BX GENERIC EQUIV) 0.3 MG/0.3ML injection 2-pack; Inject 0.3 mg intramuscular one time if needed for tongue swelling, SOB. Go to ER immediately after use for possible rebound  Dispense: 2 each; Refill: 1    7. Visit for screening mammogram  - MA SCREENING DIGITAL BILAT - Future  (s+30); Future    8. Screening for HIV (human immunodeficiency virus)  - HIV Antigen Antibody Combo; Future  - HIV Antigen Antibody Combo    9. Need for hepatitis C screening test  - Hepatitis C Screen Reflex to HCV RNA Quant and Genotype; Future  - Hepatitis C Screen Reflex to HCV RNA Quant and Genotype    10. Status post bariatric surgery  Recommend following up with bariatric provider on a yearly basis.  - Comprehensive metabolic panel (BMP + Alb, Alk Phos, ALT, AST, Total. Bili, TP); Future  - Comprehensive metabolic panel (BMP + Alb, Alk Phos, ALT, AST, Total. Bili, TP)      Patient has been advised of split billing requirements and indicates understanding: Yes  COUNSELING:  Reviewed preventive health counseling, as reflected in patient instructions       Regular exercise       Healthy diet/nutrition    Estimated body mass index is 39.31 kg/m  as calculated from the following:    Height as of this encounter: 1.778 m (5' 10\").    Weight as of this encounter: 124.3 kg (274 lb).    Weight management plan: Discussed healthy diet and exercise guidelines    She reports that she has never smoked. She has never used smokeless tobacco.      Counseling Resources:  ATP IV Guidelines  Pooled Cohorts Equation Calculator  Breast Cancer Risk Calculator  BRCA-Related Cancer Risk Assessment: FHS-7 Tool  FRAX Risk Assessment  ICSI Preventive Guidelines  Dietary Guidelines for Americans, 2010  USDA's MyPlate  ASA Prophylaxis  Lung CA Screening    Noni Cuellar, APRN " MARIELOS  [facility]

## 2021-12-28 ENCOUNTER — E-CONSULT (OUTPATIENT)
Dept: SLEEP MEDICINE | Facility: CLINIC | Age: 51
End: 2021-12-28
Payer: COMMERCIAL

## 2021-12-28 PROCEDURE — 99207 PR NO BILLABLE SERVICE THIS VISIT: CPT | Performed by: FAMILY MEDICINE

## 2021-12-28 ASSESSMENT — ANXIETY QUESTIONNAIRES: GAD7 TOTAL SCORE: 10

## 2021-12-29 NOTE — PROGRESS NOTES
ALL SMARTFIELDS MUST BE COMPLETED FOR PATIENT CARE AND BILLING    12/28/2021     E-Consult has been denied due to: Complexity of question, needs in-person referral.    Interprofessional consultation requested by:  Noni Cuellar APRN CNP      Clinical Question/Purpose: MY CLINICAL QUESTION IS: machine is broken and recalled. She needs a new machine ordered. She was previously a patient through [facility]. States her pressure is 10. She is currently using an old machine that is not working the best for her currently.     Patient assessment and information reviewed: Prior e-consult with myself on 1/15/2021.  Prior records reviewed:   Note from [facility] on 9/3/2020 with Dr. Miranda of pulmonology.     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  Managed with CPAP 10 cm H2O, on download AHI ~6 with mostly central events.  Plan to continue CPAP on current settings.    Recommendations:   - Clinic consult (ok for virtual visit) to get up to date CPAP download and to allow for order of replacement CPAP (visit required by insurance).  - Declined e-consult so patient would not be billed for both this and clinic visit with us.       The recommendations provided in this E-Consult are based on the clinical data available to me in the medical record, and are furnished without the benefit of a comprehensive in-person or virtual patient evaluation.  This consultation should not replace the clinical judgement and evaluation of the provider ordering this E-Consult. Any new clinical issues, or changes in patient status since the filing of this E-Consult will need to be taken into account when assessing these recommendations. Please contact me if you have further questions.    My total time spent reviewing clinical information and formulating assessment was 5 minutes.    Report sent automatically to requesting provider once signed.     Robin Lr MD, MD

## 2022-01-02 ENCOUNTER — HEALTH MAINTENANCE LETTER (OUTPATIENT)
Age: 52
End: 2022-01-02

## 2022-01-12 ENCOUNTER — TELEPHONE (OUTPATIENT)
Dept: SLEEP MEDICINE | Facility: CLINIC | Age: 52
End: 2022-01-12
Payer: COMMERCIAL

## 2022-01-12 VITALS — BODY MASS INDEX: 28.63 KG/M2 | HEIGHT: 70 IN | WEIGHT: 200 LBS

## 2022-01-12 NOTE — TELEPHONE ENCOUNTER
Called patient we are needing previous sleep study records and a manual download. Patient to reach out to [facility] for sleep study records. Patient is scheduled to bring CPAP for a manual download at the [place] 1/17/2022 at 11:00 am.        Una Knight [facility] [place]

## 2022-01-18 VITALS
BODY MASS INDEX: 30.49 KG/M2 | HEIGHT: 70 IN | DIASTOLIC BLOOD PRESSURE: 72 MMHG | SYSTOLIC BLOOD PRESSURE: 110 MMHG | WEIGHT: 213 LBS | OXYGEN SATURATION: 98 % | RESPIRATION RATE: 18 BRPM | HEART RATE: 78 BPM

## 2022-01-18 VITALS — HEIGHT: 70 IN | WEIGHT: 221 LBS | BODY MASS INDEX: 31.64 KG/M2

## 2022-01-26 ENCOUNTER — VIRTUAL VISIT (OUTPATIENT)
Dept: SLEEP MEDICINE | Facility: CLINIC | Age: 52
End: 2022-01-26
Attending: NURSE PRACTITIONER
Payer: COMMERCIAL

## 2022-01-26 VITALS — HEIGHT: 70 IN | BODY MASS INDEX: 39.22 KG/M2 | WEIGHT: 274 LBS

## 2022-01-26 DIAGNOSIS — G47.33 OSA ON CPAP: Primary | ICD-10-CM

## 2022-01-26 DIAGNOSIS — G47.33 OSA (OBSTRUCTIVE SLEEP APNEA): ICD-10-CM

## 2022-01-26 PROCEDURE — 99205 OFFICE O/P NEW HI 60 MIN: CPT | Mod: 95 | Performed by: INTERNAL MEDICINE

## 2022-01-26 ASSESSMENT — PAIN SCALES - GENERAL: PAINLEVEL: NO PAIN (0)

## 2022-01-26 ASSESSMENT — SLEEP AND FATIGUE QUESTIONNAIRES
HOW LIKELY ARE YOU TO NOD OFF OR FALL ASLEEP WHILE SITTING AND READING: SLIGHT CHANCE OF DOZING
HOW LIKELY ARE YOU TO NOD OFF OR FALL ASLEEP WHILE SITTING AND TALKING TO SOMEONE: WOULD NEVER DOZE
HOW LIKELY ARE YOU TO NOD OFF OR FALL ASLEEP IN A CAR, WHILE STOPPED FOR A FEW MINUTES IN TRAFFIC: WOULD NEVER DOZE
HOW LIKELY ARE YOU TO NOD OFF OR FALL ASLEEP WHILE LYING DOWN TO REST IN THE AFTERNOON WHEN CIRCUMSTANCES PERMIT: HIGH CHANCE OF DOZING
HOW LIKELY ARE YOU TO NOD OFF OR FALL ASLEEP WHILE WATCHING TV: WOULD NEVER DOZE
HOW LIKELY ARE YOU TO NOD OFF OR FALL ASLEEP WHILE SITTING INACTIVE IN A PUBLIC PLACE: WOULD NEVER DOZE
HOW LIKELY ARE YOU TO NOD OFF OR FALL ASLEEP WHILE SITTING QUIETLY AFTER LUNCH WITHOUT ALCOHOL: SLIGHT CHANCE OF DOZING
HOW LIKELY ARE YOU TO NOD OFF OR FALL ASLEEP WHEN YOU ARE A PASSENGER IN A CAR FOR AN HOUR WITHOUT A BREAK: SLIGHT CHANCE OF DOZING

## 2022-01-26 ASSESSMENT — MIFFLIN-ST. JEOR: SCORE: 1933.11

## 2022-01-26 NOTE — PATIENT INSTRUCTIONS
Your BMI is Body mass index is 39.31 kg/m .  Weight management is a personal decision.  If you are interested in exploring weight loss strategies, the following discussion covers the approaches that may be successful. Body mass index (BMI) is one way to tell whether you are at a healthy weight, overweight, or obese. It measures your weight in relation to your height.  A BMI of 18.5 to 24.9 is in the healthy range. A person with a BMI of 25 to 29.9 is considered overweight, and someone with a BMI of 30 or greater is considered obese. More than two-thirds of [place] adults are considered overweight or obese.  Being overweight or obese increases the risk for further weight gain. Excess weight may lead to heart disease and diabetes.  Creating and following plans for healthy eating and physical activity may help you improve your health.  Weight control is part of healthy lifestyle and includes exercise, emotional health, and healthy eating habits. Careful eating habits lifelong are the mainstay of weight control. Though there are significant health benefits from weight loss, long-term weight loss with diet alone may be very difficult to achieve- studies show long-term success with dietary management in less than 10% of people. Attaining a healthy weight may be especially difficult to achieve in those with severe obesity. In some cases, medications, devices and surgical management might be considered.  What can you do?  If you are overweight or obese and are interested in methods for weight loss, you should discuss this with your provider.     Consider reducing daily calorie intake by 500 calories.     Keep a food journal.     Avoiding skipping meals, consider cutting portions instead.    Diet combined with exercise helps maintain muscle while optimizing fat loss. Strength training is particularly important for building and maintaining muscle mass. Exercise helps reduce stress, increase energy, and improves fitness.  Increasing exercise without diet control, however, may not burn enough calories to loose weight.       Start walking three days a week 10-20 minutes at a time    Work towards walking thirty minutes five days a week     Eventually, increase the speed of your walking for 1-2 minutes at time    And look into health and wellness programs that may be available through your health insurance provider, employer, local community center, or leah club.    [facility]      Treating Insomnia  Good sleeping habits are a key part of treatment. If needed, some medications may help you sleep better at first. Making healthy lifestyle changes and learning to relax can improve your sleep. Treating insomnia takes commitment, but trust that your efforts will pay off. Talk to your doctor before taking any medication.    Healthy Lifestyle  Your lifestyle affects your health and your sleep. Here are some healthy habits:    Keep a regular sleep schedule. Go to bed and get up at the same time each day.    Exercise regularly. It may help you reduce stress. Avoid strenuous exercise for two to four hours before bedtime.    Avoid or limit naps.    Use your bed only for sleep and sex.    Don t spend too much time in bed trying to fall asleep. If you can t fall asleep, get up and do something until you become tired and drowsy.    Avoid or limit caffeine and nicotine. They can keep you awake at night. Also avoid alcohol. It may help you fall asleep at first, but your sleep will not be restful.    Before Bedtime  To sleep better every night, try these tips:    Have a bedtime routine to let your body and mind know when it s time to sleep.    Going to bed should be relaxing so try to do only relaxing things around bedtime. Sleep will come sooner.    If your worries don t let you sleep, write them down in a diary. Then close it, and go to bed.    Make sure the room is not too hot or too cold. If it s not dark enough, an eye mask can help.  If it s noisy, try using earplugs.    Learn to Relax  Stress, anxiety, and body tension may keep you awake at night. To unwind before bedtime, try reading a book, meditation, or yoga. Also, try the following:    Deep breathing. Sit or lie back in a chair. Take a slow, deep breath. Hold it for 5 counts. Then breathe out slowly through your mouth. Keep doing this until you feel relaxed.    Imagery. Think of the last fun trip you took. In your mind, walk through the trip from start to finish. Put as much detail into the memory as you can remember. It will help you relax.    Cognitive Behavioral Treatment (CBT)  CBT is the most effective treatment for long-term insomnia. It tries to address the underlying causes of your sleep problems, including your habits and how you think about sleep.      Suggested Resources  Insomnia Treatment Books     Overcoming Insomnia by Johnson Gonsales and Rosario Somers (2008)    No More Sleepless Nights by Boris Velez and Yessenia Klein (1996)    Say Lam to Insomnia by Emil Staley (2009)    The Insomnia Workbook by Cuca Romero and Keith Baum (2009)    The Insomnia Answer by Jann Benjamin and Shaun Lira (2006)      Stress Management and Relaxation Books    The Relaxation and Stress Reduction Workbook by Nola Jain, Richa Davison and Reji Vega (2008)    Stress Management Workbook: Techniques and Self-Assessment Procedures by Rosita Garcia and Boni Santos (1997)    A Mindfulness-Based Stress Reduction Workbook by Romero Casey and Sparkle Marcos (2010)    The Complete Stress Management Workbook by Ted Lyn and Sterling Mast (1996)    Assert Yourself by Sophie Elzialde and Lupillo Elizalde (1977)    Relaxation Resources for Computer Download   These websites offer resources to help you relax. This list is for information only. [facility] is not responsible for the quality of services or the actions of any person or  organization.  Progressive Muscle Relaxation (PMR):     http://www.[employer]/progressive-muscle-relaxation-exercise.html     http://studentsupport.[facility]/counseling/resources/self-help/relaxation-and-stress-management/   Deep Breathing Exercises:    http://www.[employer]/breathing-awareness.html     Meditation:     wwwInvisible Sentinel    www.[employer]end[employer]editation[employer]ite.[employer] You may have to pay for some of these resources.    Guided Imagery:    http://www.[employer]/guided-imagery-scripts.html     http://[employer]/library/qwpcadovpl-evulzh-qoyenbt/     Counseling / Behavioral Health  [facility]  Visit [email or web address] or call [phone] to find a clinic close to you.      This is not a prescription and these resources are optional. You must pay for any costs when using these resources. Please ask your insurance carrier if you can be reimbursed for these resources. If so, you are responsible for sending the needed details to your insurance carrier. These resources may also be tax deductible as medical expenses. Check with your .     These programs and publications are not affiliated in any way with [employer].    Prescription has been provided for replacement auto CPAP equipment with pressure settings to range between 8 to 12 cm water, and the PAP supplies. Faxed  to [facility]    We discussed about the current wait list due to the shortage of CPAP supply.  Please  continue using  your old CPAP device until you obtain the replacement equipment [place] [email or web address] and get the supplies replaced regularly.  Please  call our clinic at [phone], after you obtain the replacement equipment to schedule a follow-up video visit in approximately 8 weeks, to review the compliance measures.    You will also receive a replacement PAP device through the [employer] at some point in time and when you get it, please take the  device to your DME provider to make sure the pressure settings are adjusted to range between 8-12 cmH2O.

## 2022-01-26 NOTE — PROGRESS NOTES
Kristine is a 51 year old who is being evaluated via a billable video visit.      How would you like to obtain your AVS? Mail a copy  If the video visit is dropped, the invitation should be resent by: Text to cell phone: [phone]  Will anyone else be joining your video visit? No   Doesn't take BP at home            Video-Visit Details    Type of service:  Video Visit  Video Start: 01/26/2022 02:06 pm  Video Stop: 01/26/2022 02:33 pm  Originating Location (pt. Location): Home    Distant Location (provider location):  [facility]     Platform used for Video Visit: [employer]       [facility]  Sleep Consultation Note     Date on this visit: 1/26/2022    Kristine Scott is sent by Noni Cuellar for a sleep consultation regarding establishing continuity of care for previously diagnosed severe obstructive sleep apnea    Primary Physician: Noni Cuellar     Chief complaint: establishing continuity of care of previously diagnosed  sleep apnea.     Kristine Scott is a pleasant 51 year old female with PMH of severe NAVA, obesity, CAD, status post stent placement, hypertension, hyperlipidemia, DENICE and anxiety. She was previously following up with sleep provider at [facility] but her insurance changed and she had to switch providers and wants to establish continuity of care at the [facility] for management of previously diagnosed sleep apnea.    Previous sleep study report(split-night sleep study):  Date of study: November 10, 2010  Weight at the time of sleep study: 260 pounds  Diagnostic PSG: All sleep stages were seen. REM sleep accounted for only 1.7% of total sleep time  AHI: 81.3/h RDI: 85.7/h mean oxygen saturation: 93% lowest O2 saturation 84%  Treatment PSG: All sleep stages observed, REM sleep still reduced at 2.9% TST  CPAP was titrated with pressure settings starting at 4 cmH2O up to 11 cmH2O.  Treatment induced central apneas were noted during the course of  the CPAP titration. According to the Sleep provider's final interpretation, CPAP was effective at correcting sleep apnea at 8 cmH2O and patient was prescribed CPAP at 8 cmH2O following the sleep study.    Her current CPAP device(set at 10 cm water according to the patient) is a [employer]ics that has been recalled and she obtained it around 2016 through [employer].  After receiving the notification about the device recall, she got the device registered through the [employer] about a month ago. Shortly thereafter, the device broke and she started using her old CPAP device which is set at fixed pressure of 8 cmH2O. She reports that pressure settings feel comfortable/she may have gotten used to it.     She needs prescription for replacement CPAP equipment and supplies.    She reports using the CPAP regularly during sleep. She has a full facemask and likes it.  With the CPAP use, there have not been any reports of snoring or apneas or awakenings due to gasping for air(until the device broke recently) or choking.  Sleep quality is better with the CPAP use. She reports positive benefits with CPAP use.     DOWNLOADABLE COMPLIANCE DATA: (CPAP with fixed pressure of 8 cmH2O)  From December 18, 2021 through January 16, 2022 reveals that she used the device for 30 out of 30 days with an averag use of 10.13 hrs on the days used.  No significant air leak was noted.  Residual AHI was 3.1 per hour.     Her bed time is 9PM. It takes 30 minutes to fall asleep. She reports 3 nocturnal awakenings mostly for  no apparent reason.  When she wakes up around 2-3AM, she has trouble falling back asleep, and she usually gets up and does household chores or other activities,  goes back to bed after some time and falls back asleep for a couple more hours.  Her wake up time is between 6/7AM.    With the CPAP use, she reports waking up feeling rested most mornings and denies any fatigue or excessive daytime sleepiness. She denies  "drowsiness while driving.  She naps once a week for 1-2 hours, does not feel better after nap and does not use the CPAP during the nap.    SLEEP SCALES:  Patient's Finger Sleepiness score 6/24   Insomnia severity index:8    She does not report  restlessness feelings in the legs.    She denies any night time behaviors - sleep walking, sleep talking, sleep eating.  She does not complain acting out dreams.       Social History  Kristine currently works  [employer]-Fri 8am-430pm.  . Lives with her . She has two children ages 20 and 28.   She drinks 2 cups of coffee and 2 cups of tea/day. Last caffeine intake is not (used to in the past) within 6 hours of bed time.She does not drink alcohol. Patient is a never smoker.  Patient does not use drugs.  She does not use medical marijuana.    Allergies:    Allergies   Allergen Reactions     Bee Venom Anaphylaxis, Angioedema and Other (See Comments)     Comment: , Description:   Throat swells up       Codeine Other (See Comments)     Paranoia  Comment: \"makes me loopy\"  Description: \"makes me loopy\", moderate to severe reaction       Hydrocodone Other (See Comments)     Other reaction(s): Intolerance-Can't Take  Comment: paranoia, Description:   Comment: paranoia, Description:   Comment: parinoia       Seasonal Allergies        Medications:    Current Outpatient Medications   Medication Sig Dispense Refill     acetaminophen (TYLENOL) 500 MG tablet Take 1,000 mg by mouth       ALPRAZolam (XANAX) 0.25 MG tablet        ARIPiprazole (ABILIFY) 10 MG tablet Take 1 tablet (10 mg) by mouth daily 90 tablet 0     aspirin (ASA) 81 MG EC tablet Take 81 mg by mouth       atorvastatin (LIPITOR) 40 MG tablet Take 1 tablet (40 mg) by mouth daily 90 tablet 3     buPROPion (WELLBUTRIN XL) 300 MG 24 hr tablet Take 1 tablet (300 mg) by mouth every morning 90 tablet 1     calcium citrate-vitamin D (CITRACAL) 315-250 MG-UNIT TABS per tablet Take by mouth 2 times daily       cholecalciferol 50 MCG " (2000 UT) tablet Take 2,000 Units by mouth       cyclobenzaprine (FLEXERIL) 5 MG tablet Take 1-2 tablets (5-10 mg) by mouth 3 times daily as needed for muscle spasms 20 tablet 3     DULoxetine (CYMBALTA) 60 MG capsule Take 120 mg by mouth       EPINEPHrine (ANY BX GENERIC EQUIV) 0.3 MG/0.3ML injection 2-pack Inject 0.3 mg intramuscular one time if needed for tongue swelling, SOB. Go to ER immediately after use for possible rebound 2 each 1     lamoTRIgine (LAMICTAL) 200 MG tablet Take 200 mg by mouth       levonorgestrel (MIRENA) 20 MCG/24HR IUD 1 each by Intrauterine route       nitroGLYcerin (NITROSTAT) 0.4 MG sublingual tablet Place 0.4 mg under the tongue       Omega-3 Fatty Acids (FISH OIL PEARLS) 183.33 MG CAPS Take 1 capsule by mouth       omeprazole (PRILOSEC) 40 MG DR capsule        Respiratory Therapy Supplies ([facility] CPAP FILTER) MISC CPAP for home use at pressure of 10cmw.  Heated humidifier, Heated humidifier x1 q5 yr , Humidifier chamber x1 q6 mo, Full face mask x1 q3 mo, Full face cushion q2 mo, Heated tubing x1 q3 mo, Headgear x1 q6 mo, Chinstrap x1 q6 mo, Filters: Disposable x2 q mo, Non-disposable filters x1 q6 mo, Length of Need: 99 months, Frequency of use: Daily.       Sharps Container (SHARPS-A-GATOR LOCKING BRACKET) MISC CPAP for home use at pressure of 9  Heated humidifier x1, Humidifier chamber x1, Heated tubing x1, Full face mask with cushion x1, Headgear x1, Filters: Disposable x1 pack, Reusable x1pk, Length of Need: 99 months, Frequency of use: Daily       traZODone (DESYREL) 50 MG tablet Take 50 mg by mouth       vitamin B complex with vitamin C (VITAMIN  B COMPLEX) tablet Take 1 tablet by mouth         Problem List:  Patient Active Problem List    Diagnosis Date Noted     DENICE (generalized anxiety disorder) 01/15/2021     Priority: Medium     History of MI (myocardial infarction) 01/15/2021     Priority: Medium     Upper back pain 01/15/2021     Priority: Medium     Pulmonary  nodules 01/15/2021     Priority: Medium     Gastrojejunal ulcer with perforation (H) 11/04/2020     Priority: Medium     Heartburn 11/04/2020     Priority: Medium     Internal hernia 11/04/2020     Priority: Medium     Knee pain 11/04/2020     Priority: Medium     Plantar fasciitis 11/04/2020     Priority: Medium     NAVA (obstructive sleep apnea) 11/04/2020     Priority: Medium     Marginal ulcer 05/18/2020     Priority: Medium     Added automatically from request for surgery [id number]  Added automatically from request for surgery [id number]       Small bowel obstruction (H) 05/07/2020     Priority: Medium     Added automatically from request for surgery [id number]  Added automatically from request for surgery [id number]       Gastrointestinal problem 08/01/2019     Priority: Medium     Requires anesthesia for GI procedures       Pneumoperitoneum 05/21/2019     Priority: Medium     Added automatically from request for surgery [id number]  Added automatically from request for surgery [id number]       Morbid obesity (H) 01/29/2019     Priority: Medium     Moderate episode of recurrent major depressive disorder (H) 02/14/2018     Priority: Medium     Mixed hyperlipidemia 02/12/2018     Priority: Medium     Essential hypertension 01/07/2014     Priority: Medium     Coronary atherosclerosis of native coronary artery 04/24/2013     Priority: Medium     Stented coronary artery 04/01/2013     Priority: Medium     Patient is on Clopidogrel (Plavix) following stent placement.  Date of Intervention:  4/1/2013   Type of Stent:  CHIRAG  Patient is expected to continue taking Clopidogrel (Plavix) for one year (until 4/1/14) unless otherwise advised due to subsequent stent placement or continued bleeding.  Patient is on Clopidogrel (Plavix) following stent placement.  Date of Intervention:  4/1/2013   Type of Stent:  CHIRAG  Patient is expected to continue taking Clopidogrel (Plavix) for one year (until 4/1/14) unless otherwise advised due to subsequent stent  placement or continued bleeding.  Patient is on Clopidogrel (Plavix) following stent placement.  Date of Intervention:  4/1/2013   Type of Stent:  CHIRAG  Patient is expected to continue taking Clopidogrel (Plavix) for one year (until 4/1/14) unless otherwise advised due to subsequent stent placement or continued bleeding.  Patient is on Clopidogrel (Plavix) following stent placement.  Date of Intervention:  4/1/2013   Type of Stent:  CHIRAG  Patient is expected to continue taking Clopidogrel (Plavix) for one year (until 4/1/14) unless otherwise advised due to subsequent stent placement or continued bleeding.       Bipolar disorder (H) 09/17/2012     Priority: Medium     Female stress incontinence 09/13/2011     Priority: Medium     Obsessive-compulsive disorder 02/17/2011     Priority: Medium     Esophageal reflux 08/05/2009     Priority: Medium     Migraine 12/09/2005     Priority: Medium        Past Medical/Surgical History:  Past Medical History:   Diagnosis Date     Anxiety      Bipolar disorder (H)      Bipolar disorder (H)      Cardiomyopathy (H)      Carpal tunnel syndrome      Coronary artery disease      Coronary artery disease 04/01/2013    2 stents     Coronary atherosclerosis      Depression      Depressive disorder      Heart attack (H) 2013     Heart attack (H)      Heart disease      Heartburn      History of anesthesia complications      Hyperlipidemia      Hypertension      Hypertension      Migraine      Morbid obesity with BMI of 40.0-44.9, adult (H)      Myocardial infarction (H) 20013     Obsessive compulsive disorder      OCD (obsessive compulsive disorder)      NAVA on CPAP      Plantar fasciitis      PONV (postoperative nausea and vomiting)      Sleep apnea     cpap     Stress incontinence      Past Surgical History:   Procedure Laterality Date     ABDOMEN SURGERY       ANGIOPLASTY  2013    x 2     BIOPSY BREAST  2012     BREAST SURGERY      reduction     CARDIAC SURGERY       CHOLECYSTECTOMY        CHOLECYSTECTOMY  2010     CYSTOCELE REPAIR       GASTRECTOMY       GASTRIC BYPASS       HERNIA REPAIR       HYSTEROSCOPY DIAGNOSTIC       MAMMOPLASTY REDUCTION  1998     OTHER SURGICAL HISTORY      little toe surgery     FL LAP GASTRIC BYPASS/FRANSISCA-EN-Y N/A 1/29/2019    Procedure: LAPAROSCOPIC FRANSISCA-EN Y, GASTRIC BYPASS;  Surgeon: Ab Connor MD;  Location: [facility];  Service: General     FL LAP,DIAGNOSTIC ABDOMEN N/A 5/21/2019    Procedure: LAPAROSCOPIC REPAIR OF PERFORATED MARGINAL ULCER;  Surgeon: Ab Connor MD;  Location: [facility] OR;  Service: General     FL LAP,DIAGNOSTIC ABDOMEN N/A 5/7/2020    Procedure: LAPAROSCOPY AND CLOSURE OF INTERNAL HERNIA;  Surgeon: Ab Connor MD;  Location: [facility];  Service: General     FRANSISCA EN Y BOWEL  01/23/2019     STENT       STOMACH SURGERY      perferated ulcer     TOE SURGERY       TUBAL LIGATION       TUBAL LIGATION  2004     VASCULAR SURGERY       WISDOM TOOTH EXTRACTION         Social History:  Social History     Socioeconomic History     Marital status:      Spouse name: Not on file     Number of children: Not on file     Years of education: Not on file     Highest education level: Not on file   Occupational History     Not on file   Tobacco Use     Smoking status: Never Smoker     Smokeless tobacco: Never Used   Vaping Use     Vaping Use: Never used   Substance and Sexual Activity     Alcohol use: No     Comment: a bottle of wine a day      Drug use: No     Sexual activity: Yes     Partners: Male     Birth control/protection: I.U.D., Female Surgical   Other Topics Concern     Parent/sibling w/ CABG, MI or angioplasty before 65F 55M? Not Asked   Social History Narrative    , 2 children,   Working FT Policy [employer]       Social Determinants of Health     Financial Resource Strain: Not on file   Food Insecurity: No Food Insecurity     Worried About Running Out of Food in the Last Year: Never true     Ran  "Out of Food in the Last Year: Never true   Transportation Needs: No Transportation Needs     Lack of Transportation (Medical): No     Lack of Transportation (Non-Medical): No   Physical Activity: Not on file   Stress: Not on file   Social Connections: Not on file   Intimate Partner Violence: Not on file   Housing Stability: Not on file       Family History:  Family History   Problem Relation Age of Onset     Cancer Mother      Depression Mother      Mental Illness Mother      Substance Abuse Paternal Grandmother      Substance Abuse Brother      Substance Abuse Sister      Substance Abuse Daughter      Depression Sister      Hypotension Mother      Anemia Mother      No Known Problems Sister      Obesity Brother      Sleep Apnea Brother      Hyperlipidemia Brother      Hypertension Brother      Depression Sister      Obesity Sister        Physical Examination:  Vitals: Ht 1.778 m (5' 10\")   Wt 124.3 kg (274 lb)   BMI 39.31 kg/m    BMI= Body mass index is 39.31 kg/m .    [place] Total Score 1/26/2022   Total score - [place] 6     General: No apparent distress, appropriately groomed  Chest: No cough, no audible wheezing, able to talk in full sentences  Skin: no rash  Psych: coherent speech, normal rate and volume, able to articulate logical thoughts, able   to abstract reason, no tangential thoughts, no hallucinations   or delusions  Her affect is normal  Neuro:  Mental status: Alert and  Oriented X 3  Speech: normal      Impression/Plan:  Previously diagnosed severe NAVA, currently treated with CPAP, in the setting of obesity, CAD, status post stent placement, hypertension, hyperlipidemia, DENICE and anxiety.   Patient reports adequate compliance with CPAP treatment and reports positive benefits with the device use.  Based on the compliance measures, NAVA is well controlled with the CPAP at 8 cmH2O.  Since her CPAP device is > 5 years old and broke, prescription was provided for replacement auto CPAP equipment with " pressure settings to range between 8 to 12 cm water, and the PAP supplies.    We discussed about the current wait list due to the shortage of CPAP supply.  She was instructed to continue using her old CPAP device until she is able to obtain the replacement equipment through the DME([employer]) and get the supplies replaced regularly.  She was instructed to call our clinic after she obtains the replacement equipment to schedule a follow-up video visit in approximately 8 weeks to review the compliance measures.  She was also informed that she will receive a replacement PAP device through the [employer] at some point in time and when she gets it, she was instructed to take the device to her DME provider to make sure the pressure settings are adjusted to range between 8-12 cmH2O.        Chronic Insomnia -multifactorial.  Psychophysiological, mental health conditions bipolar disorder, anxiety, depression.  We discussed stimulus control measures. Encouraged to follow good sleep hygiene/behavioral techniques, including reducing the caffeine consumption. She was instructed not to engage in mind stimulating activities including any household chores when she wakes up in the middle of the night and has trouble falling back asleep. Instead, she was instructed to leave her bed room, go to a different room, engage in relaxing activities such as reading/listening to music or meditation or deep breathing and return back to bed when she is ready to sleep.  She was also instructed to avoid naps during the day and  if the nap is inevitable, reduce the nap to 30 minutes by setting an alarm. She was encouraged to use the CPAP even during nap.  Information provided regarding the resources available through [place] insomnia program including cognitive behavioral therapy   Bipolar disorder, anxiety/depression: We discussed the association of insomnia with these conditions. Recommended the patient to continue follow-up with her psychiatrist  " for optimizing the management of bipolar disorder, anxiety/depression.      Obesity: We discussed weight management with healthy diet, and exercise.    Patient was strongly advised to avoid driving, operating any heavy machinery or other hazardous situations while drowsy or sleepy.  Patient was counseled on the importance of driving while alert, to pull over if drowsy, or nap before getting into the vehicle if sleepy.        CC: Noni Cuellar    The above note was dictated using voice recognition software. Although reviewed after completion, some word and grammatical error may remain . Please contact the author for any clarifications.    \"I spent a total of 60  minutes face to face with Kristine Scott during today's  Video visit.  Most of this time was spent counseling the patient and  coordinating care regarding  NAVA, CPAP treatment,  prescription for replacement CPAP equipment, discussing the plan when she obtains replacement device through [employer], insomnia , bipolar disorder/anxiety/depression, stimulus control measures weight management , going over PAP download/previous sleep study report and chart review., including documentation and further activities as noted above.\"       MD VANESSA Gabriel [facility] [address] [address] [phone]  Dept: [phone]                      "

## 2022-01-31 NOTE — NURSING NOTE
Insurance summary, DME order, office visit notes, and a copy of sleep study report send via fax to [facility] at [phone].      Una Knight [facility]

## 2022-03-22 DIAGNOSIS — M54.9 UPPER BACK PAIN: ICD-10-CM

## 2022-03-23 NOTE — TELEPHONE ENCOUNTER
Pending Prescriptions:                       Disp   Refills    cyclobenzaprine (FLEXERIL) 5 MG tablet    20 tab*3            Sig: Take 1-2 tablets (5-10 mg) by mouth 3 times daily           as needed for muscle spasms    Routing refill request to provider for review/approval because:  Drug not on the G refill protocol       Chintan Hester RN

## 2022-03-24 RX ORDER — CYCLOBENZAPRINE HCL 5 MG
5-10 TABLET ORAL 3 TIMES DAILY PRN
Qty: 20 TABLET | Refills: 3 | Status: SHIPPED | OUTPATIENT
Start: 2022-03-24

## 2022-05-25 DIAGNOSIS — K21.9 GASTROESOPHAGEAL REFLUX DISEASE WITHOUT ESOPHAGITIS: Primary | ICD-10-CM

## 2022-05-25 RX ORDER — OMEPRAZOLE 40 MG/1
CAPSULE, DELAYED RELEASE ORAL
Qty: 60 CAPSULE | Status: SHIPPED | OUTPATIENT
Start: 2022-05-25 | End: 2023-06-12

## 2022-11-19 ENCOUNTER — HEALTH MAINTENANCE LETTER (OUTPATIENT)
Age: 52
End: 2022-11-19

## 2023-01-09 ENCOUNTER — TELEPHONE (OUTPATIENT)
Dept: SURGERY | Facility: CLINIC | Age: 53
End: 2023-01-09

## 2023-01-09 DIAGNOSIS — K91.2 POSTSURGICAL MALABSORPTION: ICD-10-CM

## 2023-01-09 DIAGNOSIS — Z98.84 S/P GASTRIC BYPASS: Primary | ICD-10-CM

## 2023-01-09 DIAGNOSIS — K90.9 INTESTINAL MALABSORPTION, UNSPECIFIED: ICD-10-CM

## 2023-01-09 NOTE — TELEPHONE ENCOUNTER
4 yrs post op lab orders placed for patient in preparation for appointment with  in February. Also made her an appointment for her lab drawn at [facility] lab.    Julia Rodriguez RN, CBN  [facility]  P [phone]  F [phone]

## 2023-01-09 NOTE — TELEPHONE ENCOUNTER
----- Message from Rachele Fleming sent at 1/9/2023  9:57 AM CST -----  Kristine is scheduled to see Dr. Zamora on 2/24/23 for a post op follow up.  She is concerned about doing labs prior since no one mentioned it.  Can you put in an order for her to do this please?      She will have these done at [facility].

## 2023-02-10 ENCOUNTER — LAB (OUTPATIENT)
Dept: LAB | Facility: CLINIC | Age: 53
End: 2023-02-10
Payer: COMMERCIAL

## 2023-02-10 DIAGNOSIS — Z98.84 S/P GASTRIC BYPASS: ICD-10-CM

## 2023-02-10 DIAGNOSIS — K90.9 INTESTINAL MALABSORPTION, UNSPECIFIED: ICD-10-CM

## 2023-02-10 DIAGNOSIS — K91.2 POSTSURGICAL MALABSORPTION: ICD-10-CM

## 2023-02-10 LAB
ALBUMIN SERPL BCG-MCNC: 4.3 G/DL (ref 3.5–5.2)
ALP SERPL-CCNC: 109 U/L (ref 35–104)
ALT SERPL W P-5'-P-CCNC: 61 U/L (ref 10–35)
ANION GAP SERPL CALCULATED.3IONS-SCNC: 12 MMOL/L (ref 7–15)
AST SERPL W P-5'-P-CCNC: 45 U/L (ref 10–35)
BILIRUB SERPL-MCNC: 0.4 MG/DL
BUN SERPL-MCNC: 14.3 MG/DL (ref 6–20)
CALCIUM SERPL-MCNC: 9.9 MG/DL (ref 8.6–10)
CHLORIDE SERPL-SCNC: 101 MMOL/L (ref 98–107)
CHOLEST SERPL-MCNC: 188 MG/DL
CREAT SERPL-MCNC: 1.05 MG/DL (ref 0.51–0.95)
DEPRECATED HCO3 PLAS-SCNC: 28 MMOL/L (ref 22–29)
ERYTHROCYTE [DISTWIDTH] IN BLOOD BY AUTOMATED COUNT: 13 % (ref 10–15)
FERRITIN SERPL-MCNC: 132 NG/ML (ref 11–328)
GFR SERPL CREATININE-BSD FRML MDRD: 63 ML/MIN/1.73M2
GLUCOSE SERPL-MCNC: 105 MG/DL (ref 70–99)
HBA1C MFR BLD: 5.4 % (ref 0–5.6)
HCT VFR BLD AUTO: 43.5 % (ref 35–47)
HDLC SERPL-MCNC: 73 MG/DL
HGB BLD-MCNC: 14.9 G/DL (ref 11.7–15.7)
LDLC SERPL CALC-MCNC: 84 MG/DL
MCH RBC QN AUTO: 31.8 PG (ref 26.5–33)
MCHC RBC AUTO-ENTMCNC: 34.3 G/DL (ref 31.5–36.5)
MCV RBC AUTO: 93 FL (ref 78–100)
NONHDLC SERPL-MCNC: 115 MG/DL
PLATELET # BLD AUTO: 267 10E3/UL (ref 150–450)
POTASSIUM SERPL-SCNC: 4 MMOL/L (ref 3.4–5.3)
PROT SERPL-MCNC: 7.2 G/DL (ref 6.4–8.3)
PTH-INTACT SERPL-MCNC: 33 PG/ML (ref 15–65)
RBC # BLD AUTO: 4.69 10E6/UL (ref 3.8–5.2)
SODIUM SERPL-SCNC: 141 MMOL/L (ref 136–145)
TRIGL SERPL-MCNC: 155 MG/DL
VIT B12 SERPL-MCNC: 3343 PG/ML (ref 232–1245)
WBC # BLD AUTO: 6.1 10E3/UL (ref 4–11)

## 2023-02-10 PROCEDURE — 80053 COMPREHEN METABOLIC PANEL: CPT

## 2023-02-10 PROCEDURE — 80061 LIPID PANEL: CPT

## 2023-02-10 PROCEDURE — 84590 ASSAY OF VITAMIN A: CPT | Mod: 90

## 2023-02-10 PROCEDURE — 82728 ASSAY OF FERRITIN: CPT

## 2023-02-10 PROCEDURE — 84425 ASSAY OF VITAMIN B-1: CPT | Mod: 90

## 2023-02-10 PROCEDURE — 83970 ASSAY OF PARATHORMONE: CPT

## 2023-02-10 PROCEDURE — 99000 SPECIMEN HANDLING OFFICE-LAB: CPT

## 2023-02-10 PROCEDURE — 84630 ASSAY OF ZINC: CPT | Mod: 90

## 2023-02-10 PROCEDURE — 83036 HEMOGLOBIN GLYCOSYLATED A1C: CPT

## 2023-02-10 PROCEDURE — 36415 COLL VENOUS BLD VENIPUNCTURE: CPT

## 2023-02-10 PROCEDURE — 82607 VITAMIN B-12: CPT

## 2023-02-10 PROCEDURE — 85027 COMPLETE CBC AUTOMATED: CPT

## 2023-02-10 PROCEDURE — 82306 VITAMIN D 25 HYDROXY: CPT

## 2023-02-10 PROCEDURE — 82746 ASSAY OF FOLIC ACID SERUM: CPT

## 2023-02-11 LAB
DEPRECATED CALCIDIOL+CALCIFEROL SERPL-MC: 44 UG/L (ref 20–75)
FOLATE SERPL-MCNC: >40 NG/ML (ref 4.6–34.8)

## 2023-02-12 LAB — ZINC SERPL-MCNC: 120.8 UG/DL

## 2023-02-14 LAB
ANNOTATION COMMENT IMP: NORMAL
RETINYL PALMITATE SERPL-MCNC: 0.03 MG/L
VIT A SERPL-MCNC: 0.95 MG/L

## 2023-02-16 LAB — VIT B1 PYROPHOSHATE BLD-SCNC: 294 NMOL/L

## 2023-02-24 ENCOUNTER — VIRTUAL VISIT (OUTPATIENT)
Dept: SURGERY | Facility: CLINIC | Age: 53
End: 2023-02-24
Payer: COMMERCIAL

## 2023-02-24 VITALS — WEIGHT: 277 LBS | HEIGHT: 70 IN | BODY MASS INDEX: 39.65 KG/M2

## 2023-02-24 DIAGNOSIS — K91.2 POSTOPERATIVE MALABSORPTION: Primary | ICD-10-CM

## 2023-02-24 DIAGNOSIS — E66.01 MORBID OBESITY (H): ICD-10-CM

## 2023-02-24 PROCEDURE — 99214 OFFICE O/P EST MOD 30 MIN: CPT | Mod: VID | Performed by: FAMILY MEDICINE

## 2023-02-24 RX ORDER — SEMAGLUTIDE 1 MG/.5ML
1 INJECTION, SOLUTION SUBCUTANEOUS WEEKLY
Qty: 2 ML | Refills: 0 | Status: SHIPPED | OUTPATIENT
Start: 2023-04-24 | End: 2023-05-22

## 2023-02-24 RX ORDER — SEMAGLUTIDE 0.25 MG/.5ML
2.5 INJECTION, SOLUTION SUBCUTANEOUS WEEKLY
Qty: 2 ML | Refills: 0 | Status: SHIPPED | OUTPATIENT
Start: 2023-02-24 | End: 2023-03-24

## 2023-02-24 RX ORDER — SEMAGLUTIDE 0.5 MG/.5ML
0.5 INJECTION, SOLUTION SUBCUTANEOUS WEEKLY
Qty: 2 ML | Refills: 0 | Status: SHIPPED | OUTPATIENT
Start: 2023-03-24 | End: 2023-04-21

## 2023-02-24 RX ORDER — ARIPIPRAZOLE 5 MG/1
TABLET ORAL
COMMUNITY
Start: 2023-01-02

## 2023-02-24 RX ORDER — ROSUVASTATIN CALCIUM 40 MG/1
TABLET, COATED ORAL
COMMUNITY
Start: 2023-02-14

## 2023-02-24 NOTE — PROGRESS NOTES
Kristine Scott is 52 year old  female who presents for a billable video visit today.    How would you like to obtain your AVS? MyChart  If dropped from the video visit, the video invitation should be resent by: Text to cell phone: [phone]  Will anyone else be joining your video visit? No      Video Start Time:1:03    Are there any specific questions or needs that you would like addressed at your visit today? 4yr PO - put on weight       Bariatric Follow Up Visit with a History of Previous Bariatric Surgery     Date of visit: 2/24/2023  Physician: Sherly Zamora MD, MD  Primary Care Provider:  Noni Cuellar VANESSA Tyler   52 year old  female    Date of Surgery: 1/29/2019  Initial Weight: 309#  Initial BMI: 44.34  Today's Weight:   Wt Readings from Last 1 Encounters:   02/24/23 125.6 kg (277 lb)     Body mass index is 39.75 kg/m .      Assessment and Plan     Assessment: Kristine is a 52 year old year old female who is 4 yrs s/p  Teri en Y Gastric Bypass with Dr. Phelan  Kristine VANESSA Scott feels as if she had achieved the goals she hoped to accomplish through bariatric surgery and weight loss. COVID stress caused weight regain    Encounter Diagnoses   Name Primary?     Postoperative malabsorption Yes     Morbid obesity (H)          Current Outpatient Medications:      Semaglutide-Weight Management (WEGOVY) 0.25 MG/0.5ML pen, Inject 2.5 mg Subcutaneous once a week for 28 days, Disp: 2 mL, Rfl: 0     [START ON 3/24/2023] Semaglutide-Weight Management (WEGOVY) 0.5 MG/0.5ML pen, Inject 0.5 mg Subcutaneous once a week for 28 days, Disp: 2 mL, Rfl: 0     [START ON 4/24/2023] Semaglutide-Weight Management (WEGOVY) 1 MG/0.5ML pen, Inject 1 mg Subcutaneous once a week for 28 days, Disp: 2 mL, Rfl: 0     acetaminophen (TYLENOL) 500 MG tablet, Take 1,000 mg by mouth, Disp: , Rfl:      ALPRAZolam (XANAX) 0.25 MG tablet, , Disp: , Rfl:      ARIPiprazole (ABILIFY) 10 MG tablet, Take 1 tablet (10 mg) by mouth daily, Disp: 90  tablet, Rfl: 0     ARIPiprazole (ABILIFY) 5 MG tablet, , Disp: , Rfl:      aspirin (ASA) 81 MG EC tablet, Take 81 mg by mouth, Disp: , Rfl:      atorvastatin (LIPITOR) 40 MG tablet, Take 1 tablet (40 mg) by mouth daily, Disp: 90 tablet, Rfl: 3     buPROPion (WELLBUTRIN XL) 300 MG 24 hr tablet, Take 1 tablet (300 mg) by mouth every morning, Disp: 90 tablet, Rfl: 1     calcium citrate-vitamin D (CITRACAL) 315-250 MG-UNIT TABS per tablet, Take by mouth 2 times daily, Disp: , Rfl:      cholecalciferol 50 MCG (2000 UT) tablet, Take 2,000 Units by mouth, Disp: , Rfl:      cyclobenzaprine (FLEXERIL) 5 MG tablet, Take 1-2 tablets (5-10 mg) by mouth 3 times daily as needed for muscle spasms, Disp: 20 tablet, Rfl: 3     DULoxetine (CYMBALTA) 60 MG capsule, Take 120 mg by mouth, Disp: , Rfl:      EPINEPHrine (ANY BX GENERIC EQUIV) 0.3 MG/0.3ML injection 2-pack, Inject 0.3 mg intramuscular one time if needed for tongue swelling, SOB. Go to ER immediately after use for possible rebound, Disp: 2 each, Rfl: 1     lamoTRIgine (LAMICTAL) 200 MG tablet, Take 200 mg by mouth, Disp: , Rfl:      levonorgestrel (MIRENA) 20 MCG/24HR IUD, 1 each by Intrauterine route, Disp: , Rfl:      nitroGLYcerin (NITROSTAT) 0.4 MG sublingual tablet, Place 0.4 mg under the tongue, Disp: , Rfl:      Omega-3 Fatty Acids (FISH OIL PEARLS) 183.33 MG CAPS, Take 1 capsule by mouth, Disp: , Rfl:      omeprazole (PRILOSEC) 40 MG DR capsule, Take 1 capsule (40 mg total) by mouth 2 (two) times a day., Disp: 60 capsule, Rfl: PRN     Respiratory Therapy Supplies ([facility] CPAP FILTER) MISC, CPAP for home use at pressure of 10cmw.  Heated humidifier, Heated humidifier x1 q5 yr , Humidifier chamber x1 q6 mo, Full face mask x1 q3 mo, Full face cushion q2 mo, Heated tubing x1 q3 mo, Headgear x1 q6 mo, Chinstrap x1 q6 mo, Filters: Disposable x2 q mo, Non-disposable filters x1 q6 mo, Length of Need: 99 months, Frequency of use: Daily., Disp: , Rfl:       "rosuvastatin (CRESTOR) 40 MG tablet, , Disp: , Rfl:      Sharps Container (SHARPS-A-GATOR LOCKING BRACKET) MISC, CPAP for home use at pressure of 9  Heated humidifier x1, Humidifier chamber x1, Heated tubing x1, Full face mask with cushion x1, Headgear x1, Filters: Disposable x1 pack, Reusable x1pk, Length of Need: 99 months, Frequency of use: Daily, Disp: , Rfl:      traZODone (DESYREL) 50 MG tablet, Take 50 mg by mouth, Disp: , Rfl:      vitamin B complex with vitamin C (VITAMIN  B COMPLEX) tablet, Take 1 tablet by mouth, Disp: , Rfl:     Plan: Wegovy. Continue Mediterranean approach, vitamins as is, and elliptical most days of the week. Dietitian prn.     Return in about 3 months (around 5/24/2023). for Wegovy increase to 1.7mg. Can be by Epic message or phone if doing well    Bariatric Surgery Review     Interim History/LifeChanges: Stress has been high and has had weight regain eating poorly, drinking pop and creamer, drinking with meals.  saw cardiology and they are doing the Mediterranean. Feels she can eat more than she should    Patient Concerns: weight regain  Appetite (1-10):better  GERD: on PPI BID    Medication changes: no    Vitamin Intake:   B-12   SL   MVI  2/d Flintsotone's with iron   Vitamin D  5,000U 125mcg   Calcium   citrate     Other  fish oil and biotin              LABS: \"Reviewed    Nausea no  Vomiting no  Constipation no  Diarrhea no  Rashes yes  Hair Loss resolved  Calf tenderness no  Breathing difficulty no  Reactive Hypoglycemia yes  Light Headedness no  Moods euthymic    12 point ROS as above and otherwise negative      Habits:  Alcohol: 2/wk  Tobacco: no  Caffeine coffee  NSAIDS ASA  Exercise Routine: Days that have a T Elliptical. In to the office on Wednesday  3 meals/day 3  Protein first not so much  ?grams/day B: greek yogurt L: salad and protein D: shrimp, quinoa, sugar snap peas  Water Separate from meals now, yes  Calorie Containing Beverages not no  Restaurant eating/wk " 2-  Sleeping 8-10 hours  Stress much lower  CPAP:   Contraception: PM  DEXA:    Social History     Social History     Socioeconomic History     Marital status:      Spouse name: Not on file     Number of children: Not on file     Years of education: Not on file     Highest education level: Not on file   Occupational History     Not on file   Tobacco Use     Smoking status: Never     Smokeless tobacco: Never   Vaping Use     Vaping Use: Never used   Substance and Sexual Activity     Alcohol use: No     Comment: a bottle of wine a day      Drug use: No     Sexual activity: Yes     Partners: Male     Birth control/protection: I.U.D., Female Surgical   Other Topics Concern     Parent/sibling w/ CABG, MI or angioplasty before 65F 55M? 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 [place]      Lives at home with her      Social Determinants of Health     Financial Resource Strain: Not on file   Food Insecurity: No Food Insecurity     Worried About Running Out of Food in the Last Year: Never true     Ran Out of Food in the Last Year: Never true   Transportation Needs: No Transportation Needs     Lack of Transportation (Medical): No     Lack of Transportation (Non-Medical): No   Physical Activity: Not on file   Stress: Not on file   Social Connections: Not on file   Intimate Partner Violence: Not on file   Housing Stability: Not on file       Past Medical History     Past Medical History:   Diagnosis Date     Anxiety      Bipolar disorder (H)      Bipolar disorder (H)      Cardiomyopathy (H)      Carpal tunnel syndrome      Coronary artery disease      Coronary artery disease 04/01/2013    2 stents     Coronary atherosclerosis      Depression      Depressive disorder      Heart attack (H) 2013     Heart attack (H)      Heart disease      Heartburn      History of anesthesia complications      Hyperlipidemia      Hypertension      Hypertension      Migraine      Morbid  obesity with BMI of 40.0-44.9, adult (H)      Myocardial infarction (H) 20013     Obsessive compulsive disorder      OCD (obsessive compulsive disorder)      NAVA on CPAP      Plantar fasciitis      PONV (postoperative nausea and vomiting)      Sleep apnea     cpap     Stress incontinence      Problem List     Patient Active Problem List   Diagnosis     Bipolar disorder (H)     Coronary atherosclerosis of native coronary artery     Esophageal reflux     Essential hypertension     Female stress incontinence     Gastrointestinal problem     Gastrojejunal ulcer with perforation (H)     Heartburn     Internal hernia     Knee pain     Marginal ulcer     Migraine     Mixed hyperlipidemia     Moderate episode of recurrent major depressive disorder (H)     Morbid obesity (H)     Obsessive-compulsive disorder     Plantar fasciitis     Pneumoperitoneum     NAVA (obstructive sleep apnea)     Small bowel obstruction (H)     Stented coronary artery     DENICE (generalized anxiety disorder)     History of MI (myocardial infarction)     Upper back pain     Pulmonary nodules     Medications       Current Outpatient Medications:      Semaglutide-Weight Management (WEGOVY) 0.25 MG/0.5ML pen, Inject 2.5 mg Subcutaneous once a week for 28 days, Disp: 2 mL, Rfl: 0     [START ON 3/24/2023] Semaglutide-Weight Management (WEGOVY) 0.5 MG/0.5ML pen, Inject 0.5 mg Subcutaneous once a week for 28 days, Disp: 2 mL, Rfl: 0     [START ON 4/24/2023] Semaglutide-Weight Management (WEGOVY) 1 MG/0.5ML pen, Inject 1 mg Subcutaneous once a week for 28 days, Disp: 2 mL, Rfl: 0     acetaminophen (TYLENOL) 500 MG tablet, Take 1,000 mg by mouth, Disp: , Rfl:      ALPRAZolam (XANAX) 0.25 MG tablet, , Disp: , Rfl:      ARIPiprazole (ABILIFY) 10 MG tablet, Take 1 tablet (10 mg) by mouth daily, Disp: 90 tablet, Rfl: 0     ARIPiprazole (ABILIFY) 5 MG tablet, , Disp: , Rfl:      aspirin (ASA) 81 MG EC tablet, Take 81 mg by mouth, Disp: , Rfl:      atorvastatin  (LIPITOR) 40 MG tablet, Take 1 tablet (40 mg) by mouth daily, Disp: 90 tablet, Rfl: 3     buPROPion (WELLBUTRIN XL) 300 MG 24 hr tablet, Take 1 tablet (300 mg) by mouth every morning, Disp: 90 tablet, Rfl: 1     calcium citrate-vitamin D (CITRACAL) 315-250 MG-UNIT TABS per tablet, Take by mouth 2 times daily, Disp: , Rfl:      cholecalciferol 50 MCG (2000 UT) tablet, Take 2,000 Units by mouth, Disp: , Rfl:      cyclobenzaprine (FLEXERIL) 5 MG tablet, Take 1-2 tablets (5-10 mg) by mouth 3 times daily as needed for muscle spasms, Disp: 20 tablet, Rfl: 3     DULoxetine (CYMBALTA) 60 MG capsule, Take 120 mg by mouth, Disp: , Rfl:      EPINEPHrine (ANY BX GENERIC EQUIV) 0.3 MG/0.3ML injection 2-pack, Inject 0.3 mg intramuscular one time if needed for tongue swelling, SOB. Go to ER immediately after use for possible rebound, Disp: 2 each, Rfl: 1     lamoTRIgine (LAMICTAL) 200 MG tablet, Take 200 mg by mouth, Disp: , Rfl:      levonorgestrel (MIRENA) 20 MCG/24HR IUD, 1 each by Intrauterine route, Disp: , Rfl:      nitroGLYcerin (NITROSTAT) 0.4 MG sublingual tablet, Place 0.4 mg under the tongue, Disp: , Rfl:      Omega-3 Fatty Acids (FISH OIL PEARLS) 183.33 MG CAPS, Take 1 capsule by mouth, Disp: , Rfl:      omeprazole (PRILOSEC) 40 MG DR capsule, Take 1 capsule (40 mg total) by mouth 2 (two) times a day., Disp: 60 capsule, Rfl: PRN     Respiratory Therapy Supplies ([facility] CPAP FILTER) MISC, CPAP for home use at pressure of 10cmw.  Heated humidifier, Heated humidifier x1 q5 yr , Humidifier chamber x1 q6 mo, Full face mask x1 q3 mo, Full face cushion q2 mo, Heated tubing x1 q3 mo, Headgear x1 q6 mo, Chinstrap x1 q6 mo, Filters: Disposable x2 q mo, Non-disposable filters x1 q6 mo, Length of Need: 99 months, Frequency of use: Daily., Disp: , Rfl:      rosuvastatin (CRESTOR) 40 MG tablet, , Disp: , Rfl:      Sharps Container (SHARPS-A-GATOR LOCKING BRACKET) MISC, CPAP for home use at pressure of 9  Heated  "humidifier x1, Humidifier chamber x1, Heated tubing x1, Full face mask with cushion x1, Headgear x1, Filters: Disposable x1 pack, Reusable x1pk, Length of Need: 99 months, Frequency of use: Daily, Disp: , Rfl:      traZODone (DESYREL) 50 MG tablet, Take 50 mg by mouth, Disp: , Rfl:      vitamin B complex with vitamin C (VITAMIN  B COMPLEX) tablet, Take 1 tablet by mouth, Disp: , Rfl:    Surgical History     Past Surgical History  She has a past surgical history that includes Teri En Y bowel (01/23/2019); gastric bypass; hernia repair; Stomach surgery; Breast surgery; tubal ligation; Toe Surgery; Cholecystectomy; Stent; [place] Tooth Extraction; other surgical history; tubal ligation (2004); Cholecystectomy (2010); Mammoplasty reduction (1998); Biopsy breast (2012); Angioplasty (2013); Cystocele Repair; Cardiac surgery; Hysteroscopy Diagnostic; Pr Lap Gastric Bypass/Teri-En-Y (N/A, 1/29/2019); Pr Lap,Diagnostic Abdomen (N/A, 5/21/2019); Pr Lap,Diagnostic Abdomen (N/A, 5/7/2020); Gastrectomy; vascular surgery; and Abdomen surgery.    Objective-Exam     Constitutional:  Ht 1.778 m (5' 10\")   Wt 125.6 kg (277 lb)   BMI 39.75 kg/m    General:  Pleasant and in no acute distress     Psychiatric: alert and oriented X3, mood and affect normal    Counseling     We reviewed the important post op bariatric recommendations:  -eating 3 meals daily  -eating protein first, getting >60gm protein daily  -eating slowly, chewing food well  -avoiding/limiting calorie containing beverages  -drinking water 15-30 minutes before or after meals  -choosing wheat, not white with breads, crackers, pastas, noemy, bagels, tortillas, rice  -limiting restaurant or cafeteria eating to twice a week or less    We discussed the importance of restorative sleep and stress management in maintaining a healthy weight.  We discussed the National Weight Control Registry healthy weight maintenance strategies and ways to optimize metabolism.  We discussed the " importance of physical activity including cardiovascular and strength training in maintaining a healthier weight.    We discussed the importance of life-long vitamin supplementation and life-long  follow-up.    Kristine was reminded that, to avoid marginal ulcers she should avoid tobacco at all, alcohol in excess, caffeine in excess, and NSAIDS (unless indicated for cardioprotection or othewise and opposed by a PPI).    Sherly Zamora MD, MD, [facility].  2/24/2023  1:07 PM  Total time spent on the date of this encounter doing: chart review, review of test results, patient visit, physical exam, education, counseling, developing plan of care, and documenting = 30 minutes.      Video-Visit Details    Type of service:  Video Visit    Platform used for Video Visit: UR Mobile    Video End Time (time video stopped): 1:33    Originating Location (pt. Location): Home        Distant Location (provider location):  Off-site    Distant Location (provider location):  [facility]

## 2023-02-24 NOTE — LETTER
2/24/2023         RE: Kristine Scott  [address]  [facility] [address]        Dear Colleague,    Thank you for referring your patient, Kristine Scott, to the [facility] [place]. Please see a copy of my visit note below.    Kristine Scott is 52 year old  female who presents for a billable video visit today.    How would you like to obtain your AVS? MyChart  If dropped from the video visit, the video invitation should be resent by: Text to cell phone: [phone]  Will anyone else be joining your video visit? No      Video Start Time:1:03    Are there any specific questions or needs that you would like addressed at your visit today? 4yr PO - put on weight       Bariatric Follow Up Visit with a History of Previous Bariatric Surgery     Date of visit: 2/24/2023  Physician: Sherly Zamora MD, MD  Primary Care Provider:  Noni Cuellar   52 year old  female    Date of Surgery: 1/29/2019  Initial Weight: 309#  Initial BMI: 44.34  Today's Weight:   Wt Readings from Last 1 Encounters:   02/24/23 125.6 kg (277 lb)     Body mass index is 39.75 kg/m .      Assessment and Plan     Assessment: Kristine is a 52 year old year old female who is 4 yrs s/p  Teri en Y Gastric Bypass with Dr. Zhane Carmona VANESSA Tyler feels as if she had achieved the goals she hoped to accomplish through bariatric surgery and weight loss. COVID stress caused weight regain    Encounter Diagnoses   Name Primary?     Postoperative malabsorption Yes     Morbid obesity (H)          Current Outpatient Medications:      Semaglutide-Weight Management (WEGOVY) 0.25 MG/0.5ML pen, Inject 2.5 mg Subcutaneous once a week for 28 days, Disp: 2 mL, Rfl: 0     [START ON 3/24/2023] Semaglutide-Weight Management (WEGOVY) 0.5 MG/0.5ML pen, Inject 0.5 mg Subcutaneous once a week for 28 days, Disp: 2 mL, Rfl: 0     [START ON 4/24/2023] Semaglutide-Weight Management (WEGOVY) 1 MG/0.5ML pen, Inject 1 mg Subcutaneous once  a week for 28 days, Disp: 2 mL, Rfl: 0     acetaminophen (TYLENOL) 500 MG tablet, Take 1,000 mg by mouth, Disp: , Rfl:      ALPRAZolam (XANAX) 0.25 MG tablet, , Disp: , Rfl:      ARIPiprazole (ABILIFY) 10 MG tablet, Take 1 tablet (10 mg) by mouth daily, Disp: 90 tablet, Rfl: 0     ARIPiprazole (ABILIFY) 5 MG tablet, , Disp: , Rfl:      aspirin (ASA) 81 MG EC tablet, Take 81 mg by mouth, Disp: , Rfl:      atorvastatin (LIPITOR) 40 MG tablet, Take 1 tablet (40 mg) by mouth daily, Disp: 90 tablet, Rfl: 3     buPROPion (WELLBUTRIN XL) 300 MG 24 hr tablet, Take 1 tablet (300 mg) by mouth every morning, Disp: 90 tablet, Rfl: 1     calcium citrate-vitamin D (CITRACAL) 315-250 MG-UNIT TABS per tablet, Take by mouth 2 times daily, Disp: , Rfl:      cholecalciferol 50 MCG (2000 UT) tablet, Take 2,000 Units by mouth, Disp: , Rfl:      cyclobenzaprine (FLEXERIL) 5 MG tablet, Take 1-2 tablets (5-10 mg) by mouth 3 times daily as needed for muscle spasms, Disp: 20 tablet, Rfl: 3     DULoxetine (CYMBALTA) 60 MG capsule, Take 120 mg by mouth, Disp: , Rfl:      EPINEPHrine (ANY BX GENERIC EQUIV) 0.3 MG/0.3ML injection 2-pack, Inject 0.3 mg intramuscular one time if needed for tongue swelling, SOB. Go to ER immediately after use for possible rebound, Disp: 2 each, Rfl: 1     lamoTRIgine (LAMICTAL) 200 MG tablet, Take 200 mg by mouth, Disp: , Rfl:      levonorgestrel (MIRENA) 20 MCG/24HR IUD, 1 each by Intrauterine route, Disp: , Rfl:      nitroGLYcerin (NITROSTAT) 0.4 MG sublingual tablet, Place 0.4 mg under the tongue, Disp: , Rfl:      Omega-3 Fatty Acids (FISH OIL PEARLS) 183.33 MG CAPS, Take 1 capsule by mouth, Disp: , Rfl:      omeprazole (PRILOSEC) 40 MG DR capsule, Take 1 capsule (40 mg total) by mouth 2 (two) times a day., Disp: 60 capsule, Rfl: PRN     Respiratory Therapy Supplies (CARETOUCH UNIVERSL CPAP FILTER) MISC, CPAP for home use at pressure of 10cmw.  Heated humidifier, Heated humidifier x1 q5 yr , Humidifier chamber  "x1 q6 mo, Full face mask x1 q3 mo, Full face cushion q2 mo, Heated tubing x1 q3 mo, Headgear x1 q6 mo, Chinstrap x1 q6 mo, Filters: Disposable x2 q mo, Non-disposable filters x1 q6 mo, Length of Need: 99 months, Frequency of use: Daily., Disp: , Rfl:      rosuvastatin (CRESTOR) 40 MG tablet, , Disp: , Rfl:      Sharps Container (SHARPS-A-GATOR LOCKING BRACKET) MISC, CPAP for home use at pressure of 9  Heated humidifier x1, Humidifier chamber x1, Heated tubing x1, Full face mask with cushion x1, Headgear x1, Filters: Disposable x1 pack, Reusable x1pk, Length of Need: 99 months, Frequency of use: Daily, Disp: , Rfl:      traZODone (DESYREL) 50 MG tablet, Take 50 mg by mouth, Disp: , Rfl:      vitamin B complex with vitamin C (VITAMIN  B COMPLEX) tablet, Take 1 tablet by mouth, Disp: , Rfl:     Plan: Wegovy. Continue Mediterranean approach, vitamins as is, and elliptical most days of the week. Dietitian prn.     Return in about 3 months (around 5/24/2023). for Wegovy increase to 1.7mg. Can be by Epic message or phone if doing well    Bariatric Surgery Review     Interim History/LifeChanges: Stress has been high and has had weight regain eating poorly, drinking pop and creamer, drinking with meals.  saw cardiology and they are doing the Mediterranean. Feels she can eat more than she should    Patient Concerns: weight regain  Appetite (1-10):better  GERD: on PPI BID    Medication changes: no    Vitamin Intake:   B-12   SL   MVI  2/d Flintsotone's with iron   Vitamin D  5,000U 125mcg   Calcium   citrate     Other  fish oil and biotin              LABS: \"Reviewed    Nausea no  Vomiting no  Constipation no  Diarrhea no  Rashes yes  Hair Loss resolved  Calf tenderness no  Breathing difficulty no  Reactive Hypoglycemia yes  Light Headedness no  Moods euthymic    12 point ROS as above and otherwise negative      Habits:  Alcohol: 2/wk  Tobacco: no  Caffeine coffee  NSAIDS ASA  Exercise Routine: Days that have a T " Elliptical. In to the office on Wednesday  3 meals/day 3  Protein first not so much  ?grams/day B: greek yogurt L: salad and protein D: shrimp, quinoa, sugar snap peas  Water Separate from meals now, yes  Calorie Containing Beverages not no  Restaurant eating/wk 2-  Sleeping 8-10 hours  Stress much lower  CPAP:   Contraception: PM  DEXA:    Social History     Social History     Socioeconomic History     Marital status:      Spouse name: Not on file     Number of children: Not on file     Years of education: Not on file     Highest education level: Not on file   Occupational History     Not on file   Tobacco Use     Smoking status: Never     Smokeless tobacco: Never   Vaping Use     Vaping Use: Never used   Substance and Sexual Activity     Alcohol use: No     Comment: a bottle of wine a day      Drug use: No     Sexual activity: Yes     Partners: Male     Birth control/protection: I.U.D., Female Surgical   Other Topics Concern     Parent/sibling w/ CABG, MI or angioplasty before 65F 55M? 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 [place]      Lives at home with her      Social Determinants of Health     Financial Resource Strain: Not on file   Food Insecurity: No Food Insecurity     Worried About Running Out of Food in the Last Year: Never true     Ran Out of Food in the Last Year: Never true   Transportation Needs: No Transportation Needs     Lack of Transportation (Medical): No     Lack of Transportation (Non-Medical): No   Physical Activity: Not on file   Stress: Not on file   Social Connections: Not on file   Intimate Partner Violence: Not on file   Housing Stability: Not on file       Past Medical History     Past Medical History:   Diagnosis Date     Anxiety      Bipolar disorder (H)      Bipolar disorder (H)      Cardiomyopathy (H)      Carpal tunnel syndrome      Coronary artery disease      Coronary artery disease 04/01/2013    2 stents     Coronary  atherosclerosis      Depression      Depressive disorder      Heart attack (H) 2013     Heart attack (H)      Heart disease      Heartburn      History of anesthesia complications      Hyperlipidemia      Hypertension      Hypertension      Migraine      Morbid obesity with BMI of 40.0-44.9, adult (H)      Myocardial infarction (H) 20013     Obsessive compulsive disorder      OCD (obsessive compulsive disorder)      NAVA on CPAP      Plantar fasciitis      PONV (postoperative nausea and vomiting)      Sleep apnea     cpap     Stress incontinence      Problem List     Patient Active Problem List   Diagnosis     Bipolar disorder (H)     Coronary atherosclerosis of native coronary artery     Esophageal reflux     Essential hypertension     Female stress incontinence     Gastrointestinal problem     Gastrojejunal ulcer with perforation (H)     Heartburn     Internal hernia     Knee pain     Marginal ulcer     Migraine     Mixed hyperlipidemia     Moderate episode of recurrent major depressive disorder (H)     Morbid obesity (H)     Obsessive-compulsive disorder     Plantar fasciitis     Pneumoperitoneum     NAVA (obstructive sleep apnea)     Small bowel obstruction (H)     Stented coronary artery     DENICE (generalized anxiety disorder)     History of MI (myocardial infarction)     Upper back pain     Pulmonary nodules     Medications       Current Outpatient Medications:      Semaglutide-Weight Management (WEGOVY) 0.25 MG/0.5ML pen, Inject 2.5 mg Subcutaneous once a week for 28 days, Disp: 2 mL, Rfl: 0     [START ON 3/24/2023] Semaglutide-Weight Management (WEGOVY) 0.5 MG/0.5ML pen, Inject 0.5 mg Subcutaneous once a week for 28 days, Disp: 2 mL, Rfl: 0     [START ON 4/24/2023] Semaglutide-Weight Management (WEGOVY) 1 MG/0.5ML pen, Inject 1 mg Subcutaneous once a week for 28 days, Disp: 2 mL, Rfl: 0     acetaminophen (TYLENOL) 500 MG tablet, Take 1,000 mg by mouth, Disp: , Rfl:      ALPRAZolam (XANAX) 0.25 MG tablet, ,  Disp: , Rfl:      ARIPiprazole (ABILIFY) 10 MG tablet, Take 1 tablet (10 mg) by mouth daily, Disp: 90 tablet, Rfl: 0     ARIPiprazole (ABILIFY) 5 MG tablet, , Disp: , Rfl:      aspirin (ASA) 81 MG EC tablet, Take 81 mg by mouth, Disp: , Rfl:      atorvastatin (LIPITOR) 40 MG tablet, Take 1 tablet (40 mg) by mouth daily, Disp: 90 tablet, Rfl: 3     buPROPion (WELLBUTRIN XL) 300 MG 24 hr tablet, Take 1 tablet (300 mg) by mouth every morning, Disp: 90 tablet, Rfl: 1     calcium citrate-vitamin D (CITRACAL) 315-250 MG-UNIT TABS per tablet, Take by mouth 2 times daily, Disp: , Rfl:      cholecalciferol 50 MCG (2000 UT) tablet, Take 2,000 Units by mouth, Disp: , Rfl:      cyclobenzaprine (FLEXERIL) 5 MG tablet, Take 1-2 tablets (5-10 mg) by mouth 3 times daily as needed for muscle spasms, Disp: 20 tablet, Rfl: 3     DULoxetine (CYMBALTA) 60 MG capsule, Take 120 mg by mouth, Disp: , Rfl:      EPINEPHrine (ANY BX GENERIC EQUIV) 0.3 MG/0.3ML injection 2-pack, Inject 0.3 mg intramuscular one time if needed for tongue swelling, SOB. Go to ER immediately after use for possible rebound, Disp: 2 each, Rfl: 1     lamoTRIgine (LAMICTAL) 200 MG tablet, Take 200 mg by mouth, Disp: , Rfl:      levonorgestrel (MIRENA) 20 MCG/24HR IUD, 1 each by Intrauterine route, Disp: , Rfl:      nitroGLYcerin (NITROSTAT) 0.4 MG sublingual tablet, Place 0.4 mg under the tongue, Disp: , Rfl:      Omega-3 Fatty Acids (FISH OIL PEARLS) 183.33 MG CAPS, Take 1 capsule by mouth, Disp: , Rfl:      omeprazole (PRILOSEC) 40 MG DR capsule, Take 1 capsule (40 mg total) by mouth 2 (two) times a day., Disp: 60 capsule, Rfl: PRN     Respiratory Therapy Supplies ([facility] CPAP FILTER) MISC, CPAP for home use at pressure of 10cmw.  Heated humidifier, Heated humidifier x1 q5 yr , Humidifier chamber x1 q6 mo, Full face mask x1 q3 mo, Full face cushion q2 mo, Heated tubing x1 q3 mo, Headgear x1 q6 mo, Chinstrap x1 q6 mo, Filters: Disposable x2 q mo,  "Non-disposable filters x1 q6 mo, Length of Need: 99 months, Frequency of use: Daily., Disp: , Rfl:      rosuvastatin (CRESTOR) 40 MG tablet, , Disp: , Rfl:      Sharps Container (SHARPS-A-GATOR LOCKING BRACKET) MISC, CPAP for home use at pressure of 9  Heated humidifier x1, Humidifier chamber x1, Heated tubing x1, Full face mask with cushion x1, Headgear x1, Filters: Disposable x1 pack, Reusable x1pk, Length of Need: 99 months, Frequency of use: Daily, Disp: , Rfl:      traZODone (DESYREL) 50 MG tablet, Take 50 mg by mouth, Disp: , Rfl:      vitamin B complex with vitamin C (VITAMIN  B COMPLEX) tablet, Take 1 tablet by mouth, Disp: , Rfl:    Surgical History     Past Surgical History  She has a past surgical history that includes Teri En Y bowel (01/23/2019); gastric bypass; hernia repair; Stomach surgery; Breast surgery; tubal ligation; Toe Surgery; Cholecystectomy; Stent; [place] Tooth Extraction; other surgical history; tubal ligation (2004); Cholecystectomy (2010); Mammoplasty reduction (1998); Biopsy breast (2012); Angioplasty (2013); Cystocele Repair; Cardiac surgery; Hysteroscopy Diagnostic; Pr Lap Gastric Bypass/Teri-En-Y (N/A, 1/29/2019); Pr Lap,Diagnostic Abdomen (N/A, 5/21/2019); Pr Lap,Diagnostic Abdomen (N/A, 5/7/2020); Gastrectomy; vascular surgery; and Abdomen surgery.    Objective-Exam     Constitutional:  Ht 1.778 m (5' 10\")   Wt 125.6 kg (277 lb)   BMI 39.75 kg/m    General:  Pleasant and in no acute distress     Psychiatric: alert and oriented X3, mood and affect normal    Counseling     We reviewed the important post op bariatric recommendations:  -eating 3 meals daily  -eating protein first, getting >60gm protein daily  -eating slowly, chewing food well  -avoiding/limiting calorie containing beverages  -drinking water 15-30 minutes before or after meals  -choosing wheat, not white with breads, crackers, pastas, noemy, bagels, tortillas, rice  -limiting restaurant or cafeteria eating to twice a " week or less    We discussed the importance of restorative sleep and stress management in maintaining a healthy weight.  We discussed the National Weight Control Registry healthy weight maintenance strategies and ways to optimize metabolism.  We discussed the importance of physical activity including cardiovascular and strength training in maintaining a healthier weight.    We discussed the importance of life-long vitamin supplementation and life-long  follow-up.    Kristine was reminded that, to avoid marginal ulcers she should avoid tobacco at all, alcohol in excess, caffeine in excess, and NSAIDS (unless indicated for cardioprotection or othewise and opposed by a PPI).    Sherly Zamora MD, MD, [facility].  2/24/2023  1:07 PM  Total time spent on the date of this encounter doing: chart review, review of test results, patient visit, physical exam, education, counseling, developing plan of care, and documenting = 30 minutes.      Video-Visit Details    Type of service:  Video Visit    Platform used for Video Visit: [employer]    Video End Time (time video stopped): 1:33    Originating Location (pt. Location): Home        Distant Location (provider location):  Off-site    Distant Location (provider location):  [facility] [place]       Again, thank you for allowing me to participate in the care of your patient.        Sincerely,        Sherly Zamora MD

## 2023-04-11 ENCOUNTER — TELEPHONE (OUTPATIENT)
Dept: SURGERY | Facility: CLINIC | Age: 53
End: 2023-04-11
Payer: COMMERCIAL

## 2023-04-11 NOTE — TELEPHONE ENCOUNTER
Patient is having side effects from Wegovy. Patient stated she has a rash all over her tongue & a constant upset stomach.  Please advise    [phone] okay to leave VM

## 2023-04-11 NOTE — TELEPHONE ENCOUNTER
"Says the rash on her tongue started 3 days ago when she started the 0.5 mg dosing of Wegovy. This was her first dose of 0.5mg. She did have a mild upset stomach when she first started the medication at the 0.25 mg dosage but this is worse. She says her tongue is sore and feels like her \" taste buds are inflamed\". She can't see anything but her tongue \"seems puffy\". She says that the rash and the stomach upset have not improved since taking the shot 3 days ago. Her upset stomach is worse when she eats/drinks so she is just mainly drinking water. I let her know that I will run her symptoms by  and get her recommendations. Pt verbalized understanding and has no complaints of any other symptoms. She sounds good on the phone and is not having trouble breathing.     Julia Rodriguez RN, CBN  [facility]  P [phone]  F [phone]      "

## 2023-04-12 NOTE — TELEPHONE ENCOUNTER
Called pt back to check in and let her know that  would like to have her stop the medication to see if her symptoms resolve. Left her a message with the info and asked her to call with any questions.    Julia Rodriguez RN, CBN  [facility]  P [phone]  F [phone]

## 2023-05-01 ENCOUNTER — TELEPHONE (OUTPATIENT)
Dept: SURGERY | Facility: CLINIC | Age: 53
End: 2023-05-01
Payer: COMMERCIAL

## 2023-05-01 NOTE — TELEPHONE ENCOUNTER
Left pt a message letting her know that it would be best to make an appointment for a visit with  to discuss medication options. I gave her the phone numbers to both the nurse line and scheduling.       Julia Rodriguez RN, CBN  [facility]  P [phone]  F [phone]

## 2023-05-01 NOTE — TELEPHONE ENCOUNTER
Vincenzo Zamora,    Pt wanted to see with you about getting back on Wegovy.    Best Callback Number: [phone]  Okay to leave detailed message    Thanks~ aap

## 2023-06-12 DIAGNOSIS — K21.9 GASTROESOPHAGEAL REFLUX DISEASE WITHOUT ESOPHAGITIS: ICD-10-CM

## 2023-06-12 RX ORDER — OMEPRAZOLE 40 MG/1
CAPSULE, DELAYED RELEASE ORAL
Qty: 60 CAPSULE | Status: SHIPPED | OUTPATIENT
Start: 2023-06-12 | End: 2024-06-24

## 2023-07-26 ENCOUNTER — TELEPHONE (OUTPATIENT)
Dept: SLEEP MEDICINE | Facility: CLINIC | Age: 53
End: 2023-07-26
Payer: COMMERCIAL

## 2023-07-28 ENCOUNTER — VIRTUAL VISIT (OUTPATIENT)
Dept: SURGERY | Facility: CLINIC | Age: 53
End: 2023-07-28
Payer: COMMERCIAL

## 2023-07-28 VITALS — BODY MASS INDEX: 37.22 KG/M2 | HEIGHT: 70 IN | WEIGHT: 260 LBS

## 2023-07-28 DIAGNOSIS — K91.2 POSTOPERATIVE MALABSORPTION: Primary | ICD-10-CM

## 2023-07-28 DIAGNOSIS — E66.01 MORBID OBESITY (H): ICD-10-CM

## 2023-07-28 PROCEDURE — 99214 OFFICE O/P EST MOD 30 MIN: CPT | Mod: VID | Performed by: FAMILY MEDICINE

## 2023-07-28 RX ORDER — SEMAGLUTIDE 0.5 MG/.5ML
0.5 INJECTION, SOLUTION SUBCUTANEOUS WEEKLY
Qty: 2 ML | Refills: 0 | Status: SHIPPED | OUTPATIENT
Start: 2023-07-28 | End: 2023-09-01

## 2023-07-28 RX ORDER — SEMAGLUTIDE 0.25 MG/.5ML
0.25 INJECTION, SOLUTION SUBCUTANEOUS WEEKLY
Qty: 2 ML | Refills: 0 | Status: SHIPPED | OUTPATIENT
Start: 2023-07-28 | End: 2023-08-25

## 2023-07-28 RX ORDER — SEMAGLUTIDE 1 MG/.5ML
1 INJECTION, SOLUTION SUBCUTANEOUS WEEKLY
Qty: 2 ML | Refills: 0 | Status: SHIPPED | OUTPATIENT
Start: 2023-07-28 | End: 2023-08-25

## 2023-07-28 RX ORDER — PHENTERMINE HYDROCHLORIDE 37.5 MG/1
18.75-37.5 TABLET ORAL
Qty: 90 TABLET | Refills: 1 | Status: SHIPPED | OUTPATIENT
Start: 2023-07-28 | End: 2024-01-24

## 2023-07-28 RX ORDER — BENZALKONIUM CHLORIDE 1.3 MG/ML
CLOTH TOPICAL
COMMUNITY
Start: 2023-05-19

## 2023-07-28 NOTE — PATIENT INSTRUCTIONS
We offer support groups for patients who are working on weight loss and considering, preparing for or have had weight loss surgery.   There is no cost for this opportunity.  You are invited to attend the?Virtual Support Groups?provided by any of the following locations:    [facility] via Wami Teams with Faiza Terrell RN  2.   [place] via Wami Teams with Jann Tracy, PhD, LP  3.   [place] via Backand with Karyna Schulte RN  4.   [facility] via Wami Teams with Karyna Machado [facility]    The following Support Group information can also be found on our website:  [email or web address]      [facility] Weight Loss Surgery Support Group    [facility] Weight Loss Surgery Support Group  The support group is a patient-lead forum that meets monthly to share experiences, encouragement and education. It is open to those who have had weight loss surgery, are scheduled for surgery, and those who are considering surgery.   WHEN: This group meets on the 3rd Wednesday of each month from 5:00PM - 6:00PM virtually using Microsoft Teams.   FACILITATOR: Led by Faiza Terrell, the program's Clinical Coordinator.   TO REGISTER: Please contact the clinic via [employer] or call the nurse line directly at [phone] to inform our staff that you would like an invite sent to you and to let us know the email you would like the invite sent to. Prior to the meeting, a link with directions on how to join the meeting will be sent to you.    [facility] Support Groups    Connections: Bariatric Care Support Group?  This is open to all [facility] (and those external to this program) pre- and post- operative bariatric surgery patients as well as their support system.   WHEN: This group meets the 3rd Tuesday of each month from 6:30 PM - 8:00 PM virtually using Microsoft Teams.   FACILITATOR:  Led by Jann Tracy, Ph.D who is a Licensed Psychologist with the [facility] Comprehensive Weight Management Program.   TO REGISTER: Please send an email to Jann Tracy, Ph.D.,  at [email or web address]?if you would like an invitation to the group and to learn about using Microsoft Teams.      Connections: Post-Operative Bariatric Surgery Support Group  This is a support group for [facility] bariatric patients (and those external to [facility]) who have had bariatric surgery and are at least 3 months post-surgery.  WHEN: This support group meets the 4th Wednesday of the month from 11:00 AM - 12:00 PM virtually using Microsoft Teams.   FACILITATOR: Led by Certified Bariatric Nurse, Karyna Schulte RN.   TO REGISTER: Please send an email to Karyna at [email or web address] if you would like an invitation to the group and to learn about using [employer].      [facility] Healthy Lifestyle Virtual Support Group    Healthy Lifestyle Virtual Support Group?  This is 60 minutes of small group guided discussion, support and resources. All are welcome who want a healthy lifestyle.  WHEN: This group meets monthly on a Friday from 12:30 PM - to 1:30 PM virtually using Microsoft Teams.   FACILITATOR: Led by National Board Certified Health , Karyna Machado [facility].   TO REGISTER: Please send an email to Karyna [email or web address] to receive monthly invites to the group or if you have any questions about having a health .  Prior to the meeting, a link with directions on how to join the meeting will be sent to you.

## 2023-07-28 NOTE — PROGRESS NOTES
Kristine Scott is 53 year old  female who presents for a billable video visit today.    How would you like to obtain your AVS? MyChart  If dropped from the video visit, the video invitation should be resent by: Send to e-mail at: [email or web address].  Will anyone else be joining your video visit? No      Video Start Time:  8:10    Are there any specific questions or needs that you would like addressed at your visit today? No questions or concerns today.       Bariatric Follow Up Visit with a History of Previous Bariatric Surgery     Date of visit: 7/28/2023  Physician: Sherly Zamora MD, MD  Primary Care Provider:  Noni Cuellar   53 year old  female    Date of Surgery: 129/2019  Initial Weight: 309#  Initial BMI: 44.34  Today's Weight:   Wt Readings from Last 1 Encounters:   07/28/23 117.9 kg (260 lb)     Body mass index is 37.31 kg/m .      Assessment and Plan     Assessment: Kristine is a 53 year old year old female who is 4 yrs s/p  Teri en Y Gastric Bypass with Dr. Phelan  Kristine VANESSA Scott feels as if she has achieved the goals she hoped to accomplish through bariatric surgery and weight loss.    Encounter Diagnoses   Name Primary?    Postoperative malabsorption Yes    Morbid obesity (H)          Current Outpatient Medications:     phentermine (ADIPEX-P) 37.5 MG tablet, Take 0.5-1 tablets (18.75-37.5 mg) by mouth every morning (before breakfast) for 180 days, Disp: 90 tablet, Rfl: 1    Respiratory Therapy Supplies (CARETOUCH 2 CPAP HOSE ) MISC, CPAP for home use at pressure of 10cmw.  Heated humidifier, Heated humidifier x1 q5 yr , Humidifier chamber x1 q6 mo, Full face mask x1 q3 mo, Full face cushion q2 mo, Heated tubing x1 q3 mo, Headgear x1 q6 mo, Chinstrap x1 q6 mo, Filters: Disposable x2 q mo, Non-disposable filters x1 q6 mo, Length of Need: 99 months, Frequency of use: Daily., Disp: , Rfl:     Semaglutide-Weight Management (WEGOVY) 0.25 MG/0.5ML pen, Inject 0.25 mg Subcutaneous once  a week for 28 days, Disp: 2 mL, Rfl: 0    Semaglutide-Weight Management (WEGOVY) 0.5 MG/0.5ML pen, Inject 0.5 mg Subcutaneous once a week for 28 days, Disp: 2 mL, Rfl: 0    Semaglutide-Weight Management (WEGOVY) 1 MG/0.5ML pen, Inject 1 mg Subcutaneous once a week for 28 days, Disp: 2 mL, Rfl: 0    acetaminophen (TYLENOL) 500 MG tablet, Take 1,000 mg by mouth, Disp: , Rfl:     ALPRAZolam (XANAX) 0.25 MG tablet, , Disp: , Rfl:     ARIPiprazole (ABILIFY) 10 MG tablet, Take 1 tablet (10 mg) by mouth daily, Disp: 90 tablet, Rfl: 0    ARIPiprazole (ABILIFY) 5 MG tablet, , Disp: , Rfl:     aspirin (ASA) 81 MG EC tablet, Take 81 mg by mouth, Disp: , Rfl:     atorvastatin (LIPITOR) 40 MG tablet, Take 1 tablet (40 mg) by mouth daily, Disp: 90 tablet, Rfl: 3    buPROPion (WELLBUTRIN XL) 300 MG 24 hr tablet, Take 1 tablet (300 mg) by mouth every morning, Disp: 90 tablet, Rfl: 1    calcium citrate-vitamin D (CITRACAL) 315-250 MG-UNIT TABS per tablet, Take by mouth 2 times daily, Disp: , Rfl:     cholecalciferol 50 MCG (2000 UT) tablet, Take 2,000 Units by mouth, Disp: , Rfl:     cyclobenzaprine (FLEXERIL) 5 MG tablet, Take 1-2 tablets (5-10 mg) by mouth 3 times daily as needed for muscle spasms (Patient not taking: Reported on 7/28/2023), Disp: 20 tablet, Rfl: 3    DULoxetine (CYMBALTA) 60 MG capsule, Take 120 mg by mouth, Disp: , Rfl:     EPINEPHrine (ANY BX GENERIC EQUIV) 0.3 MG/0.3ML injection 2-pack, Inject 0.3 mg intramuscular one time if needed for tongue swelling, SOB. Go to ER immediately after use for possible rebound, Disp: 2 each, Rfl: 1    lamoTRIgine (LAMICTAL) 200 MG tablet, Take 200 mg by mouth, Disp: , Rfl:     levonorgestrel (MIRENA) 20 MCG/24HR IUD, 1 each by Intrauterine route, Disp: , Rfl:     nitroGLYcerin (NITROSTAT) 0.4 MG sublingual tablet, Place 0.4 mg under the tongue, Disp: , Rfl:     Omega-3 Fatty Acids (FISH OIL PEARLS) 183.33 MG CAPS, Take 1 capsule by mouth, Disp: , Rfl:     omeprazole (PRILOSEC)  "40 MG DR capsule, Take 1 capsule (40 mg total) by mouth 2 (two) times a day., Disp: 60 capsule, Rfl: PRN    Respiratory Therapy Supplies (CAREUCH UNIVERSL CPAP FILTER) MISC, CPAP for home use at pressure of 10cmw.  Heated humidifier, Heated humidifier x1 q5 yr , Humidifier chamber x1 q6 mo, Full face mask x1 q3 mo, Full face cushion q2 mo, Heated tubing x1 q3 mo, Headgear x1 q6 mo, Chinstrap x1 q6 mo, Filters: Disposable x2 q mo, Non-disposable filters x1 q6 mo, Length of Need: 99 months, Frequency of use: Daily., Disp: , Rfl:     rosuvastatin (CRESTOR) 40 MG tablet, , Disp: , Rfl:     Sharps Container (SHARPS-A-GATOR LOCKING BRACKET) MISC, CPAP for home use at pressure of 9  Heated humidifier x1, Humidifier chamber x1, Heated tubing x1, Full face mask with cushion x1, Headgear x1, Filters: Disposable x1 pack, Reusable x1pk, Length of Need: 99 months, Frequency of use: Daily, Disp: , Rfl:     traZODone (DESYREL) 50 MG tablet, Take 50 mg by mouth, Disp: , Rfl:     vitamin B complex with vitamin C (VITAMIN  B COMPLEX) tablet, Take 1 tablet by mouth, Disp: , Rfl:     Plan: Discussed restarting GLP-1 RA agonist therapy. Would like to wait until Semaglutide available. Had a depression and was off of medications. Annual labs in February. Continue vitamins with consistency    Return in about 3 months (around 10/28/2023).    Bariatric Surgery Review     Interim History/LifeChanges: Had a depression believed to be perimenopause and a little more drinking. Had attributed her depression to Wegovy and stopped it. Now, went to treatment.  not a drinker and is avid with Mediterranean diet and exercise. Waking some.    Patient Concerns:   Appetite (1-10): OK  GERD: on PPI     Medication changes: stable on abilify, cymbalta,     Vitamin Intake:   B-12   sl   MVI  Yes   Vitamin D  Daily uncertain dose   Calcium   citrate     Other  B-complex              LABS: \"Reviewed  From Feb vitamins looked good, LFTs and trigs " elevated, recent labs improved. Annual labs in Feb  Nausea no  Vomiting no  Constipation no  Diarrhea no  Rashes no  Hair Loss no  Calf tenderness no  Breathing difficulty no  Reactive Hypoglycemia avoids  Light Headedness no   Moods stable    12 point ROS as above and otherwise negative      Habits:  Alcohol: no  Tobacco: no  Caffeine no  NSAIDS no  Exercise Routine: walking now 2 days a week. Working form home  3 meals/day yes  Protein first yes  60grams/day  Water Separate from meals yes  Calorie Containing Beverages some more Sprite   Restaurant eating/wk <2  Sleeping good with trazodone  Stress OK  CPAP: NA  Contraception: PM  DEXA:not indicated    Social History     Social History     Socioeconomic History    Marital status:      Spouse name: Not on file    Number of children: Not on file    Years of education: Not on file    Highest education level: Not on file   Occupational History    Not on file   Tobacco Use    Smoking status: Never    Smokeless tobacco: Never   Vaping Use    Vaping Use: Never used   Substance and Sexual Activity    Alcohol use: No     Comment: a bottle of wine a day     Drug use: No    Sexual activity: Yes     Partners: Male     Birth control/protection: I.U.D., Female Surgical   Other Topics Concern    Parent/sibling w/ CABG, MI or angioplasty before 65F 55M? 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 [place]      Lives at home with her      Social Determinants of Health     Financial Resource Strain: Not on file   Food Insecurity: No Food Insecurity (3/17/2021)    Hunger Vital Sign     Worried About Running Out of Food in the Last Year: Never true     Ran Out of Food in the Last Year: Never true   Transportation Needs: No Transportation Needs (3/17/2021)    PRAPARE - Transportation     Lack of Transportation (Medical): No     Lack of Transportation (Non-Medical): No   Physical Activity: Not on file   Stress: Not on file   Social  Connections: Not on file   Intimate Partner Violence: Not on file   Housing Stability: Not on file       Past Medical History     Past Medical History:   Diagnosis Date    Anxiety     Bipolar disorder (H)     Bipolar disorder (H)     Cardiomyopathy (H)     Carpal tunnel syndrome     Coronary artery disease     Coronary artery disease 04/01/2013    2 stents    Coronary atherosclerosis     Depression     Depressive disorder     Heart attack (H) 2013    Heart attack (H)     Heart disease     Heartburn     History of anesthesia complications     Hyperlipidemia     Hypertension     Hypertension     Migraine     Morbid obesity with BMI of 40.0-44.9, adult (H)     Myocardial infarction (H) 20013    Obsessive compulsive disorder     OCD (obsessive compulsive disorder)     NAVA on CPAP     Plantar fasciitis     PONV (postoperative nausea and vomiting)     Sleep apnea     cpap    Stress incontinence      Problem List     Patient Active Problem List   Diagnosis    Bipolar disorder (H)    Coronary atherosclerosis of native coronary artery    Esophageal reflux    Essential hypertension    Female stress incontinence    Gastrointestinal problem    Gastrojejunal ulcer with perforation (H)    Heartburn    Internal hernia    Knee pain    Marginal ulcer    Migraine    Mixed hyperlipidemia    Moderate episode of recurrent major depressive disorder (H)    Morbid obesity (H)    Obsessive-compulsive disorder    Plantar fasciitis    Pneumoperitoneum    NAVA (obstructive sleep apnea)    Small bowel obstruction (H)    Stented coronary artery    DENICE (generalized anxiety disorder)    History of MI (myocardial infarction)    Upper back pain    Pulmonary nodules     Medications       Current Outpatient Medications:     phentermine (ADIPEX-P) 37.5 MG tablet, Take 0.5-1 tablets (18.75-37.5 mg) by mouth every morning (before breakfast) for 180 days, Disp: 90 tablet, Rfl: 1    Respiratory Therapy Supplies (CARETOUCH 2 CPAP HOSE ) MISC, CPAP for  home use at pressure of 10cmw.  Heated humidifier, Heated humidifier x1 q5 yr , Humidifier chamber x1 q6 mo, Full face mask x1 q3 mo, Full face cushion q2 mo, Heated tubing x1 q3 mo, Headgear x1 q6 mo, Chinstrap x1 q6 mo, Filters: Disposable x2 q mo, Non-disposable filters x1 q6 mo, Length of Need: 99 months, Frequency of use: Daily., Disp: , Rfl:     Semaglutide-Weight Management (WEGOVY) 0.25 MG/0.5ML pen, Inject 0.25 mg Subcutaneous once a week for 28 days, Disp: 2 mL, Rfl: 0    Semaglutide-Weight Management (WEGOVY) 0.5 MG/0.5ML pen, Inject 0.5 mg Subcutaneous once a week for 28 days, Disp: 2 mL, Rfl: 0    Semaglutide-Weight Management (WEGOVY) 1 MG/0.5ML pen, Inject 1 mg Subcutaneous once a week for 28 days, Disp: 2 mL, Rfl: 0    acetaminophen (TYLENOL) 500 MG tablet, Take 1,000 mg by mouth, Disp: , Rfl:     ALPRAZolam (XANAX) 0.25 MG tablet, , Disp: , Rfl:     ARIPiprazole (ABILIFY) 10 MG tablet, Take 1 tablet (10 mg) by mouth daily, Disp: 90 tablet, Rfl: 0    ARIPiprazole (ABILIFY) 5 MG tablet, , Disp: , Rfl:     aspirin (ASA) 81 MG EC tablet, Take 81 mg by mouth, Disp: , Rfl:     atorvastatin (LIPITOR) 40 MG tablet, Take 1 tablet (40 mg) by mouth daily, Disp: 90 tablet, Rfl: 3    buPROPion (WELLBUTRIN XL) 300 MG 24 hr tablet, Take 1 tablet (300 mg) by mouth every morning, Disp: 90 tablet, Rfl: 1    calcium citrate-vitamin D (CITRACAL) 315-250 MG-UNIT TABS per tablet, Take by mouth 2 times daily, Disp: , Rfl:     cholecalciferol 50 MCG (2000 UT) tablet, Take 2,000 Units by mouth, Disp: , Rfl:     cyclobenzaprine (FLEXERIL) 5 MG tablet, Take 1-2 tablets (5-10 mg) by mouth 3 times daily as needed for muscle spasms (Patient not taking: Reported on 7/28/2023), Disp: 20 tablet, Rfl: 3    DULoxetine (CYMBALTA) 60 MG capsule, Take 120 mg by mouth, Disp: , Rfl:     EPINEPHrine (ANY BX GENERIC EQUIV) 0.3 MG/0.3ML injection 2-pack, Inject 0.3 mg intramuscular one time if needed for tongue swelling, SOB. Go to ER  immediately after use for possible rebound, Disp: 2 each, Rfl: 1    lamoTRIgine (LAMICTAL) 200 MG tablet, Take 200 mg by mouth, Disp: , Rfl:     levonorgestrel (MIRENA) 20 MCG/24HR IUD, 1 each by Intrauterine route, Disp: , Rfl:     nitroGLYcerin (NITROSTAT) 0.4 MG sublingual tablet, Place 0.4 mg under the tongue, Disp: , Rfl:     Omega-3 Fatty Acids (FISH OIL PEARLS) 183.33 MG CAPS, Take 1 capsule by mouth, Disp: , Rfl:     omeprazole (PRILOSEC) 40 MG DR capsule, Take 1 capsule (40 mg total) by mouth 2 (two) times a day., Disp: 60 capsule, Rfl: PRN    Respiratory Therapy Supplies ([facility] CPAP FILTER) MISC, CPAP for home use at pressure of 10cmw.  Heated humidifier, Heated humidifier x1 q5 yr , Humidifier chamber x1 q6 mo, Full face mask x1 q3 mo, Full face cushion q2 mo, Heated tubing x1 q3 mo, Headgear x1 q6 mo, Chinstrap x1 q6 mo, Filters: Disposable x2 q mo, Non-disposable filters x1 q6 mo, Length of Need: 99 months, Frequency of use: Daily., Disp: , Rfl:     rosuvastatin (CRESTOR) 40 MG tablet, , Disp: , Rfl:     Sharps Container (SHARPS-A-GATOR LOCKING BRACKET) MISC, CPAP for home use at pressure of 9  Heated humidifier x1, Humidifier chamber x1, Heated tubing x1, Full face mask with cushion x1, Headgear x1, Filters: Disposable x1 pack, Reusable x1pk, Length of Need: 99 months, Frequency of use: Daily, Disp: , Rfl:     traZODone (DESYREL) 50 MG tablet, Take 50 mg by mouth, Disp: , Rfl:     vitamin B complex with vitamin C (VITAMIN  B COMPLEX) tablet, Take 1 tablet by mouth, Disp: , Rfl:    Surgical History     Past Surgical History  She has a past surgical history that includes Teri En Y bowel (01/23/2019); gastric bypass; hernia repair; Stomach surgery; Breast surgery; tubal ligation; Toe Surgery; Cholecystectomy; Stent; [place] Tooth Extraction; other surgical history; tubal ligation (2004); Cholecystectomy (2010); Mammoplasty reduction (1998); Biopsy breast (2012); Angioplasty (2013); Cystocele  "Repair; Cardiac surgery; Hysteroscopy Diagnostic; Pr Lap Gastric Bypass/Teri-En-Y (N/A, 1/29/2019); Pr Lap,Diagnostic Abdomen (N/A, 5/21/2019); Pr Lap,Diagnostic Abdomen (N/A, 5/7/2020); Gastrectomy; vascular surgery; and Abdomen surgery.    Objective-Exam     Constitutional:  Ht 1.778 m (5' 10\")   Wt 117.9 kg (260 lb)   BMI 37.31 kg/m    General:  Pleasant and in no acute distress     Psychiatric: alert and oriented X3, mood and affect normal    Counseling     We reviewed the important post op bariatric recommendations:  -eating 3 meals daily  -eating protein first, getting >60gm protein daily  -eating slowly, chewing food well  -avoiding/limiting calorie containing beverages  -drinking water 15-30 minutes before or after meals  -choosing wheat, not white with breads, crackers, pastas, noemy, bagels, tortillas, rice  -limiting restaurant or cafeteria eating to twice a week or less    We discussed the importance of restorative sleep and stress management in maintaining a healthy weight.  We discussed the National Weight Control Registry healthy weight maintenance strategies and ways to optimize metabolism.  We discussed the importance of physical activity including cardiovascular and strength training in maintaining a healthier weight.    We discussed the importance of life-long vitamin supplementation and life-long  follow-up.    Kristine was reminded that, to avoid marginal ulcers she should avoid tobacco at all, alcohol in excess, caffeine in excess, and NSAIDS (unless indicated for cardioprotection or othewise and opposed by a PPI).    Sherly Zamora MD, MD, [facility].  7/28/2023  8:12 AM  Total time spent on the date of this encounter doing: chart review, review of test results, patient visit, physical exam, education, counseling, developing plan of care, and documenting = 30 minutes.      Video-Visit Details    Type of service:  Video Visit    Platform used for Video " Visit: Thee    Video End Time (time video stopped):  8:40    Originating Location (pt. Location): Home      Distant Location (provider location):  Off-site    Distant Location (provider location):  [facility]

## 2023-07-28 NOTE — LETTER
7/28/2023         RE: Kristine Scott  [address]  [facility] [address]        Dear Colleague,    Thank you for referring your patient, Kristine Scott, to the [facility]ATR[facility] [place]. Please see a copy of my visit note below.    Kristine Scott is 53 year old  female who presents for a billable video visit today.    How would you like to obtain your AVS? MyChart  If dropped from the video visit, the video invitation should be resent by: Send to e-mail at: [email or web address].  Will anyone else be joining your video visit? No      Video Start Time:  8:10    Are there any specific questions or needs that you would like addressed at your visit today? No questions or concerns today.       Bariatric Follow Up Visit with a History of Previous Bariatric Surgery     Date of visit: 7/28/2023  Physician: Sherly Zamora MD, MD  Primary Care Provider:  Noni Cuellar VANESSA Barrosohn   53 year old  female    Date of Surgery: 129/2019  Initial Weight: 309#  Initial BMI: 44.34  Today's Weight:   Wt Readings from Last 1 Encounters:   07/28/23 117.9 kg (260 lb)     Body mass index is 37.31 kg/m .      Assessment and Plan     Assessment: Kristine is a 53 year old year old female who is 4 yrs s/p  Teri en Y Gastric Bypass with Dr. Zhane Carmona VANESSA Scott feels as if she has achieved the goals she hoped to accomplish through bariatric surgery and weight loss.    Encounter Diagnoses   Name Primary?     Postoperative malabsorption Yes     Morbid obesity (H)          Current Outpatient Medications:      phentermine (ADIPEX-P) 37.5 MG tablet, Take 0.5-1 tablets (18.75-37.5 mg) by mouth every morning (before breakfast) for 180 days, Disp: 90 tablet, Rfl: 1     Respiratory Therapy Supplies (CARETOUCH 2 CPAP HOSE ) MISC, CPAP for home use at pressure of 10cmw.  Heated humidifier, Heated humidifier x1 q5 yr , Humidifier chamber x1 q6 mo, Full face mask x1 q3 mo, Full face cushion q2 mo, Heated tubing x1  q3 mo, Headgear x1 q6 mo, Chinstrap x1 q6 mo, Filters: Disposable x2 q mo, Non-disposable filters x1 q6 mo, Length of Need: 99 months, Frequency of use: Daily., Disp: , Rfl:      Semaglutide-Weight Management (WEGOVY) 0.25 MG/0.5ML pen, Inject 0.25 mg Subcutaneous once a week for 28 days, Disp: 2 mL, Rfl: 0     Semaglutide-Weight Management (WEGOVY) 0.5 MG/0.5ML pen, Inject 0.5 mg Subcutaneous once a week for 28 days, Disp: 2 mL, Rfl: 0     Semaglutide-Weight Management (WEGOVY) 1 MG/0.5ML pen, Inject 1 mg Subcutaneous once a week for 28 days, Disp: 2 mL, Rfl: 0     acetaminophen (TYLENOL) 500 MG tablet, Take 1,000 mg by mouth, Disp: , Rfl:      ALPRAZolam (XANAX) 0.25 MG tablet, , Disp: , Rfl:      ARIPiprazole (ABILIFY) 10 MG tablet, Take 1 tablet (10 mg) by mouth daily, Disp: 90 tablet, Rfl: 0     ARIPiprazole (ABILIFY) 5 MG tablet, , Disp: , Rfl:      aspirin (ASA) 81 MG EC tablet, Take 81 mg by mouth, Disp: , Rfl:      atorvastatin (LIPITOR) 40 MG tablet, Take 1 tablet (40 mg) by mouth daily, Disp: 90 tablet, Rfl: 3     buPROPion (WELLBUTRIN XL) 300 MG 24 hr tablet, Take 1 tablet (300 mg) by mouth every morning, Disp: 90 tablet, Rfl: 1     calcium citrate-vitamin D (CITRACAL) 315-250 MG-UNIT TABS per tablet, Take by mouth 2 times daily, Disp: , Rfl:      cholecalciferol 50 MCG (2000 UT) tablet, Take 2,000 Units by mouth, Disp: , Rfl:      cyclobenzaprine (FLEXERIL) 5 MG tablet, Take 1-2 tablets (5-10 mg) by mouth 3 times daily as needed for muscle spasms (Patient not taking: Reported on 7/28/2023), Disp: 20 tablet, Rfl: 3     DULoxetine (CYMBALTA) 60 MG capsule, Take 120 mg by mouth, Disp: , Rfl:      EPINEPHrine (ANY BX GENERIC EQUIV) 0.3 MG/0.3ML injection 2-pack, Inject 0.3 mg intramuscular one time if needed for tongue swelling, SOB. Go to ER immediately after use for possible rebound, Disp: 2 each, Rfl: 1     lamoTRIgine (LAMICTAL) 200 MG tablet, Take 200 mg by mouth, Disp: , Rfl:      levonorgestrel  (MIRENA) 20 MCG/24HR IUD, 1 each by Intrauterine route, Disp: , Rfl:      nitroGLYcerin (NITROSTAT) 0.4 MG sublingual tablet, Place 0.4 mg under the tongue, Disp: , Rfl:      Omega-3 Fatty Acids (FISH OIL PEARLS) 183.33 MG CAPS, Take 1 capsule by mouth, Disp: , Rfl:      omeprazole (PRILOSEC) 40 MG DR capsule, Take 1 capsule (40 mg total) by mouth 2 (two) times a day., Disp: 60 capsule, Rfl: PRN     Respiratory Therapy Supplies ([facility] CPAP FILTER) MISC, CPAP for home use at pressure of 10cmw.  Heated humidifier, Heated humidifier x1 q5 yr , Humidifier chamber x1 q6 mo, Full face mask x1 q3 mo, Full face cushion q2 mo, Heated tubing x1 q3 mo, Headgear x1 q6 mo, Chinstrap x1 q6 mo, Filters: Disposable x2 q mo, Non-disposable filters x1 q6 mo, Length of Need: 99 months, Frequency of use: Daily., Disp: , Rfl:      rosuvastatin (CRESTOR) 40 MG tablet, , Disp: , Rfl:      Sharps Container (SHARPS-A-GATOR LOCKING BRACKET) MISC, CPAP for home use at pressure of 9  Heated humidifier x1, Humidifier chamber x1, Heated tubing x1, Full face mask with cushion x1, Headgear x1, Filters: Disposable x1 pack, Reusable x1pk, Length of Need: 99 months, Frequency of use: Daily, Disp: , Rfl:      traZODone (DESYREL) 50 MG tablet, Take 50 mg by mouth, Disp: , Rfl:      vitamin B complex with vitamin C (VITAMIN  B COMPLEX) tablet, Take 1 tablet by mouth, Disp: , Rfl:     Plan: Discussed restarting GLP-1 RA agonist therapy. Would like to wait until Semaglutide available. Had a depression and was off of medications. Annual labs in February. Continue vitamins with consistency    Return in about 3 months (around 10/28/2023).    Bariatric Surgery Review     Interim History/LifeChanges: Had a depression believed to be perimenopause and a little more drinking. Had attributed her depression to Wegovy and stopped it. Now, went to treatment.  not a drinker and is avid with Mediterranean diet and exercise. Waking some.    Patient  "Concerns:   Appetite (1-10): OK  GERD: on PPI     Medication changes: stable on abilify, cymbalta,     Vitamin Intake:   B-12   sl   MVI  Yes   Vitamin D  Daily uncertain dose   Calcium   citrate     Other  B-complex              LABS: \"Reviewed  From Feb vitamins looked good, LFTs and trigs elevated, recent labs improved. Annual labs in Feb  Nausea no  Vomiting no  Constipation no  Diarrhea no  Rashes no  Hair Loss no  Calf tenderness no  Breathing difficulty no  Reactive Hypoglycemia avoids  Light Headedness no   Moods stable    12 point ROS as above and otherwise negative      Habits:  Alcohol: no  Tobacco: no  Caffeine no  NSAIDS no  Exercise Routine: walking now 2 days a week. Working form home  3 meals/day yes  Protein first yes  60grams/day  Water Separate from meals yes  Calorie Containing Beverages some more Sprite   Restaurant eating/wk <2  Sleeping good with trazodone  Stress OK  CPAP: NA  Contraception: PM  DEXA:not indicated    Social History     Social History     Socioeconomic History     Marital status:      Spouse name: Not on file     Number of children: Not on file     Years of education: Not on file     Highest education level: Not on file   Occupational History     Not on file   Tobacco Use     Smoking status: Never     Smokeless tobacco: Never   Vaping Use     Vaping Use: Never used   Substance and Sexual Activity     Alcohol use: No     Comment: a bottle of wine a day      Drug use: No     Sexual activity: Yes     Partners: Male     Birth control/protection: I.U.D., Female Surgical   Other Topics Concern     Parent/sibling w/ CABG, MI or angioplasty before 65F 55M? 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 [place]      Lives at home with her      Social Determinants of Health     Financial Resource Strain: Not on file   Food Insecurity: No Food Insecurity (3/17/2021)    Hunger Vital Sign      Worried About Running Out of Food in the Last " Year: Never true      Ran Out of Food in the Last Year: Never true   Transportation Needs: No Transportation Needs (3/17/2021)    PRAPARE - Transportation      Lack of Transportation (Medical): No      Lack of Transportation (Non-Medical): No   Physical Activity: Not on file   Stress: Not on file   Social Connections: Not on file   Intimate Partner Violence: Not on file   Housing Stability: Not on file       Past Medical History     Past Medical History:   Diagnosis Date     Anxiety      Bipolar disorder (H)      Bipolar disorder (H)      Cardiomyopathy (H)      Carpal tunnel syndrome      Coronary artery disease      Coronary artery disease 04/01/2013    2 stents     Coronary atherosclerosis      Depression      Depressive disorder      Heart attack (H) 2013     Heart attack (H)      Heart disease      Heartburn      History of anesthesia complications      Hyperlipidemia      Hypertension      Hypertension      Migraine      Morbid obesity with BMI of 40.0-44.9, adult (H)      Myocardial infarction (H) 20013     Obsessive compulsive disorder      OCD (obsessive compulsive disorder)      NAVA on CPAP      Plantar fasciitis      PONV (postoperative nausea and vomiting)      Sleep apnea     cpap     Stress incontinence      Problem List     Patient Active Problem List   Diagnosis     Bipolar disorder (H)     Coronary atherosclerosis of native coronary artery     Esophageal reflux     Essential hypertension     Female stress incontinence     Gastrointestinal problem     Gastrojejunal ulcer with perforation (H)     Heartburn     Internal hernia     Knee pain     Marginal ulcer     Migraine     Mixed hyperlipidemia     Moderate episode of recurrent major depressive disorder (H)     Morbid obesity (H)     Obsessive-compulsive disorder     Plantar fasciitis     Pneumoperitoneum     NAVA (obstructive sleep apnea)     Small bowel obstruction (H)     Stented coronary artery     DENICE (generalized anxiety disorder)     History of  MI (myocardial infarction)     Upper back pain     Pulmonary nodules     Medications       Current Outpatient Medications:      phentermine (ADIPEX-P) 37.5 MG tablet, Take 0.5-1 tablets (18.75-37.5 mg) by mouth every morning (before breakfast) for 180 days, Disp: 90 tablet, Rfl: 1     Respiratory Therapy Supplies (CARETOUCH 2 CPAP HOSE ) MISC, CPAP for home use at pressure of 10cmw.  Heated humidifier, Heated humidifier x1 q5 yr , Humidifier chamber x1 q6 mo, Full face mask x1 q3 mo, Full face cushion q2 mo, Heated tubing x1 q3 mo, Headgear x1 q6 mo, Chinstrap x1 q6 mo, Filters: Disposable x2 q mo, Non-disposable filters x1 q6 mo, Length of Need: 99 months, Frequency of use: Daily., Disp: , Rfl:      Semaglutide-Weight Management (WEGOVY) 0.25 MG/0.5ML pen, Inject 0.25 mg Subcutaneous once a week for 28 days, Disp: 2 mL, Rfl: 0     Semaglutide-Weight Management (WEGOVY) 0.5 MG/0.5ML pen, Inject 0.5 mg Subcutaneous once a week for 28 days, Disp: 2 mL, Rfl: 0     Semaglutide-Weight Management (WEGOVY) 1 MG/0.5ML pen, Inject 1 mg Subcutaneous once a week for 28 days, Disp: 2 mL, Rfl: 0     acetaminophen (TYLENOL) 500 MG tablet, Take 1,000 mg by mouth, Disp: , Rfl:      ALPRAZolam (XANAX) 0.25 MG tablet, , Disp: , Rfl:      ARIPiprazole (ABILIFY) 10 MG tablet, Take 1 tablet (10 mg) by mouth daily, Disp: 90 tablet, Rfl: 0     ARIPiprazole (ABILIFY) 5 MG tablet, , Disp: , Rfl:      aspirin (ASA) 81 MG EC tablet, Take 81 mg by mouth, Disp: , Rfl:      atorvastatin (LIPITOR) 40 MG tablet, Take 1 tablet (40 mg) by mouth daily, Disp: 90 tablet, Rfl: 3     buPROPion (WELLBUTRIN XL) 300 MG 24 hr tablet, Take 1 tablet (300 mg) by mouth every morning, Disp: 90 tablet, Rfl: 1     calcium citrate-vitamin D (CITRACAL) 315-250 MG-UNIT TABS per tablet, Take by mouth 2 times daily, Disp: , Rfl:      cholecalciferol 50 MCG (2000 UT) tablet, Take 2,000 Units by mouth, Disp: , Rfl:      cyclobenzaprine (FLEXERIL) 5 MG tablet, Take  1-2 tablets (5-10 mg) by mouth 3 times daily as needed for muscle spasms (Patient not taking: Reported on 7/28/2023), Disp: 20 tablet, Rfl: 3     DULoxetine (CYMBALTA) 60 MG capsule, Take 120 mg by mouth, Disp: , Rfl:      EPINEPHrine (ANY BX GENERIC EQUIV) 0.3 MG/0.3ML injection 2-pack, Inject 0.3 mg intramuscular one time if needed for tongue swelling, SOB. Go to ER immediately after use for possible rebound, Disp: 2 each, Rfl: 1     lamoTRIgine (LAMICTAL) 200 MG tablet, Take 200 mg by mouth, Disp: , Rfl:      levonorgestrel (MIRENA) 20 MCG/24HR IUD, 1 each by Intrauterine route, Disp: , Rfl:      nitroGLYcerin (NITROSTAT) 0.4 MG sublingual tablet, Place 0.4 mg under the tongue, Disp: , Rfl:      Omega-3 Fatty Acids (FISH OIL PEARLS) 183.33 MG CAPS, Take 1 capsule by mouth, Disp: , Rfl:      omeprazole (PRILOSEC) 40 MG DR capsule, Take 1 capsule (40 mg total) by mouth 2 (two) times a day., Disp: 60 capsule, Rfl: PRN     Respiratory Therapy Supplies ([facility] CPAP FILTER) MISC, CPAP for home use at pressure of 10cmw.  Heated humidifier, Heated humidifier x1 q5 yr , Humidifier chamber x1 q6 mo, Full face mask x1 q3 mo, Full face cushion q2 mo, Heated tubing x1 q3 mo, Headgear x1 q6 mo, Chinstrap x1 q6 mo, Filters: Disposable x2 q mo, Non-disposable filters x1 q6 mo, Length of Need: 99 months, Frequency of use: Daily., Disp: , Rfl:      rosuvastatin (CRESTOR) 40 MG tablet, , Disp: , Rfl:      Sharps Container (SHARPS-A-GATOR LOCKING BRACKET) MISC, CPAP for home use at pressure of 9  Heated humidifier x1, Humidifier chamber x1, Heated tubing x1, Full face mask with cushion x1, Headgear x1, Filters: Disposable x1 pack, Reusable x1pk, Length of Need: 99 months, Frequency of use: Daily, Disp: , Rfl:      traZODone (DESYREL) 50 MG tablet, Take 50 mg by mouth, Disp: , Rfl:      vitamin B complex with vitamin C (VITAMIN  B COMPLEX) tablet, Take 1 tablet by mouth, Disp: , Rfl:    Surgical History     Past Surgical  "History  She has a past surgical history that includes Teri En Y bowel (01/23/2019); gastric bypass; hernia repair; Stomach surgery; Breast surgery; tubal ligation; Toe Surgery; Cholecystectomy; Stent; [place] Tooth Extraction; other surgical history; tubal ligation (2004); Cholecystectomy (2010); Mammoplasty reduction (1998); Biopsy breast (2012); Angioplasty (2013); Cystocele Repair; Cardiac surgery; Hysteroscopy Diagnostic; Pr Lap Gastric Bypass/Teri-En-Y (N/A, 1/29/2019); Pr Lap,Diagnostic Abdomen (N/A, 5/21/2019); Pr Lap,Diagnostic Abdomen (N/A, 5/7/2020); Gastrectomy; vascular surgery; and Abdomen surgery.    Objective-Exam     Constitutional:  Ht 1.778 m (5' 10\")   Wt 117.9 kg (260 lb)   BMI 37.31 kg/m    General:  Pleasant and in no acute distress     Psychiatric: alert and oriented X3, mood and affect normal    Counseling     We reviewed the important post op bariatric recommendations:  -eating 3 meals daily  -eating protein first, getting >60gm protein daily  -eating slowly, chewing food well  -avoiding/limiting calorie containing beverages  -drinking water 15-30 minutes before or after meals  -choosing wheat, not white with breads, crackers, pastas, noemy, bagels, tortillas, rice  -limiting restaurant or cafeteria eating to twice a week or less    We discussed the importance of restorative sleep and stress management in maintaining a healthy weight.  We discussed the National Weight Control Registry healthy weight maintenance strategies and ways to optimize metabolism.  We discussed the importance of physical activity including cardiovascular and strength training in maintaining a healthier weight.    We discussed the importance of life-long vitamin supplementation and life-long  follow-up.    Kristine was reminded that, to avoid marginal ulcers she should avoid tobacco at all, alcohol in excess, caffeine in excess, and NSAIDS (unless indicated for cardioprotection or othewise and opposed by a PPI).    Sherly " Gretchen Zamora MD, MD, FAAFP  [facility].  7/28/2023  8:12 AM  Total time spent on the date of this encounter doing: chart review, review of test results, patient visit, physical exam, education, counseling, developing plan of care, and documenting = 30 minutes.      Video-Visit Details    Type of service:  Video Visit    Platform used for Video Visit: ESTmob    Video End Time (time video stopped):  8:40    Originating Location (pt. Location): Home      Distant Location (provider location):  Off-site    Distant Location (provider location):  [facility] CL[facility] BAR[facility]ATRICS CARE [place]        Again, thank you for allowing me to participate in the care of your patient.        Sincerely,        Sherly Zamora MD

## 2023-08-08 NOTE — TELEPHONE ENCOUNTER
Left follow up message letting patient know I did not hear back, so I will be removing her from the CPAP wait list and if she's still interested in a replacement through her insurance to call the main line [phone].

## 2023-08-30 DIAGNOSIS — E66.01 MORBID OBESITY (H): ICD-10-CM

## 2023-09-01 RX ORDER — SEMAGLUTIDE 0.5 MG/.5ML
INJECTION, SOLUTION SUBCUTANEOUS
Qty: 4 ML | Refills: 0 | Status: SHIPPED | OUTPATIENT
Start: 2023-09-01 | End: 2024-05-07

## 2023-09-01 NOTE — TELEPHONE ENCOUNTER
Pt would like to do this dose for 2 months. She is just finishing up the 0.25 mg dosing.     Julia Rodriguez RN, CBN  [facility]  P [phone]  F [phone]

## 2023-10-05 ENCOUNTER — TELEPHONE (OUTPATIENT)
Dept: SURGERY | Facility: CLINIC | Age: 53
End: 2023-10-05
Payer: COMMERCIAL

## 2023-10-05 NOTE — TELEPHONE ENCOUNTER
EPA Initiated.  Called patient and she is still taking the 0.5mg and it is going very well for her.  She doesn't feel like she can move up to the 1mg yet.  Karyna Schulte RN

## 2023-10-31 ENCOUNTER — TELEPHONE (OUTPATIENT)
Dept: SURGERY | Facility: CLINIC | Age: 53
End: 2023-10-31
Payer: COMMERCIAL

## 2023-10-31 DIAGNOSIS — E66.01 MORBID OBESITY (H): Primary | ICD-10-CM

## 2023-10-31 NOTE — TELEPHONE ENCOUNTER
Medication Question or Refill    Contacts         Type Contact Phone/Fax    10/31/2023 03:22 PM CDT Phone (Incoming) Kristine Scott (Self) [phone] (M)            What medication are you calling about (include dose and sig)?:     Semaglutide-Weight Management (WEGOVY) 0.5 MG/0.5ML pen     Preferred Pharmacy:  [facility]  [facility] [address]  Phone: [phone] Fax: [phone]      Controlled Substance Agreement on file:   CSA -- Patient Level:    CSA: None found at the patient level.       Who prescribed the medication?: Sund      Do you need a refill? Yes    When did you use the medication last? 2 weeks ago.    Patient offered an appointment? No    Do you have any questions or concerns?  Yes:     With no 0.5 supply, pt would like to be put back on 0.25.  Please call her to discuss.      Could we send this information to you in [place] or would you prefer to receive a phone call?:   Patient would prefer a phone call   Okay to leave a detailed message?: Yes at Cell number on file:    Telephone Information:   Mobile [phone]

## 2023-10-31 NOTE — TELEPHONE ENCOUNTER
Pt has been out of 0.5mg for 2 weeks and says her pharmacy cannot get it in. She would like to go back to 0.25mg for 2 months and then hopefully they will have the 0.5mg in stock.     Julia Rodriguez RN, CBN  [facility]  P [phone]  F [phone]

## 2023-12-04 ENCOUNTER — TELEPHONE (OUTPATIENT)
Dept: SURGERY | Facility: CLINIC | Age: 53
End: 2023-12-04
Payer: COMMERCIAL

## 2023-12-04 NOTE — TELEPHONE ENCOUNTER
Spoke with the patient and she is just asking for her options.  After talking through the current shortage situation with the Wegovy and that she can't seem to get the 0.5mg dose and has just been staying at 0.25mg instead and waiting.  She does have phentermine prescribed for her and will think about adding that to the mix in the meantime.  We also discussed the new medication that isn't available yet but has some promise in [facility] and she was interested in keeping the conversation going about that.  She has an appointment in May right now and so on the wait list.      Karyna Schulte< RN

## 2023-12-04 NOTE — TELEPHONE ENCOUNTER
Reason for Call:  Other prescription    Detailed comments: Patient calling stating that the medication Semaglutide-Weight Management (WEGOVY) 0.25 MG  is not working and wants to know if she can go up in dose early. Patient does have an appointment scheduled for 5/3 .     Phone Number Patient can be reached at: Cell number on file:    Telephone Information:   Mobile [phone]       Best Time: Anytime     Can we leave a detailed message on this number? YES    Call taken on 12/4/2023 at 12:50 PM by Melania Escamilla

## 2024-01-19 ENCOUNTER — TRANSFERRED RECORDS (OUTPATIENT)
Dept: HEALTH INFORMATION MANAGEMENT | Facility: CLINIC | Age: 54
End: 2024-01-19
Payer: COMMERCIAL

## 2024-01-22 ENCOUNTER — MEDICAL CORRESPONDENCE (OUTPATIENT)
Dept: HEALTH INFORMATION MANAGEMENT | Facility: CLINIC | Age: 54
End: 2024-01-22
Payer: COMMERCIAL

## 2024-01-24 ENCOUNTER — TRANSCRIBE ORDERS (OUTPATIENT)
Dept: OTHER | Age: 54
End: 2024-01-24

## 2024-01-24 DIAGNOSIS — R10.13 EPIGASTRIC PAIN: Primary | ICD-10-CM

## 2024-02-08 NOTE — TELEPHONE ENCOUNTER
REFERRAL INFORMATION:  Referring Provider: Remington Nance MD, MD     Referring Clinic:  [facility]   Reason for Visit/Diagnosis: Epigastric pain      FUTURE VISIT INFORMATION:  Appointment Date: 2/14/2024   Appointment Time: 3:25pm     Action    Action Taken 2/8/2024 1:10pm DEJA HENNING called [facility]'s IMG Dept Ph: [phone] #4- they will push scans over soon!      2/12/24 11:19 AM - Images resolved in PACS      NOTES STATUS DETAILS   OFFICE NOTE from Referring Provider Internal Remington Nance MD, MD    MEDICATION LIST Internal         PATHOLOGY REPORTS (RELATED) Care Everywhere 1/19/2024   A) STOMACH, POUCH, BIOPSY:   1. Normal gastric body mucosa   2. Negative for Helicobacter     B) GE JUNCTION, BIOPSY:   1. Normal esophageal squamous mucosa   2. Gastric cardio-oxyntic type mucosa with no diagnostic abnormalities   3. Negative for reflux changes and eosinophilic esophagitis   4. Negative for intestinal metaplasia and dysplasia     C) ESOPHAGUS, RANDOM, BIOPSY:    Colonoscopy  Complete 11/11/2020- Report in EPIC   IMAGING (CT, MRI, EGD, MRCP, Small Bowel Follow Through/SBT, MR/CT Enterography) PACS [facility]   US abdomen Limited 12/8/2023  MRI Abdomen 11/30/2023   CT abdomen Pelvis 9/18/2023

## 2024-02-14 ENCOUNTER — PRE VISIT (OUTPATIENT)
Dept: GASTROENTEROLOGY | Facility: CLINIC | Age: 54
End: 2024-02-14

## 2024-04-06 ENCOUNTER — HEALTH MAINTENANCE LETTER (OUTPATIENT)
Age: 54
End: 2024-04-06

## 2024-05-03 ENCOUNTER — VIRTUAL VISIT (OUTPATIENT)
Dept: SURGERY | Facility: CLINIC | Age: 54
End: 2024-05-03
Payer: COMMERCIAL

## 2024-05-03 VITALS — BODY MASS INDEX: 35.79 KG/M2 | HEIGHT: 70 IN | WEIGHT: 250 LBS

## 2024-05-03 DIAGNOSIS — E66.01 MORBID OBESITY (H): ICD-10-CM

## 2024-05-03 DIAGNOSIS — K91.2 POSTOPERATIVE MALABSORPTION: Primary | ICD-10-CM

## 2024-05-03 PROCEDURE — 99214 OFFICE O/P EST MOD 30 MIN: CPT | Mod: 95 | Performed by: FAMILY MEDICINE

## 2024-05-03 ASSESSMENT — PAIN SCALES - GENERAL: PAINLEVEL: MILD PAIN (3)

## 2024-05-03 NOTE — LETTER
5/3/2024         RE: Kristine Scott  [address]  [facility] [address]        Dear Colleague,    Thank you for referring your patient, Kristine Scott, to the [facility] [place]. Please see a copy of my visit note below.    Virtual Visit Details    Type of service:  Video Visit     Originating Location (pt. Location): Home    Distant Location (provider location):  On-site  Platform used for Video Visit: [facility]    Bariatric Follow Up Visit with a History of Previous Bariatric Surgery     Date of visit: 5/3/2024  Physician: Sherly Zamora MD, MD  Primary Care Provider:  Noni Cuellar   53 year old  female    Date of Surgery: 1/29/2019  Initial Weight: 309#  Initial BMI: 44.34  Today's Weight:   Wt Readings from Last 1 Encounters:   05/03/24 113.4 kg (250 lb)     Body mass index is 35.87 kg/m .      Assessment and Plan     Assessment: Kristine is a 53 year old year old female who is 4 yrs s/p  Teri en Y Gastric Bypass with Dr. Zhane Carmona VANESSA Tyler feels as if she had achieved the goals she hoped to accomplish through bariatric surgery and weight loss.    Encounter Diagnosis   Name Primary?     Postoperative malabsorption Yes         Current Outpatient Medications:      acetaminophen (TYLENOL) 500 MG tablet, Take 1,000 mg by mouth, Disp: , Rfl:      ALPRAZolam (XANAX) 0.25 MG tablet, , Disp: , Rfl:      ARIPiprazole (ABILIFY) 10 MG tablet, Take 1 tablet (10 mg) by mouth daily, Disp: 90 tablet, Rfl: 0     ARIPiprazole (ABILIFY) 5 MG tablet, , Disp: , Rfl:      aspirin (ASA) 81 MG EC tablet, Take 81 mg by mouth, Disp: , Rfl:      atorvastatin (LIPITOR) 40 MG tablet, Take 1 tablet (40 mg) by mouth daily, Disp: 90 tablet, Rfl: 3     buPROPion (WELLBUTRIN XL) 300 MG 24 hr tablet, Take 1 tablet (300 mg) by mouth every morning, Disp: 90 tablet, Rfl: 1     calcium citrate-vitamin D (CITRACAL) 315-250 MG-UNIT TABS per tablet, Take by mouth 2 times daily, Disp: , Rfl:       cholecalciferol 50 MCG (2000 UT) tablet, Take 2,000 Units by mouth, Disp: , Rfl:      DULoxetine (CYMBALTA) 60 MG capsule, Take 120 mg by mouth, Disp: , Rfl:      EPINEPHrine (ANY BX GENERIC EQUIV) 0.3 MG/0.3ML injection 2-pack, Inject the contents of one device into the muscle as needed for anaphylaxis; may repeat one time in 5-15 minutes if response to initial dose is inadequate if needed for tongue swelling, SOB. Go to ER immediately after use for possible rebound, Disp: 2 each, Rfl: 1     lamoTRIgine (LAMICTAL) 200 MG tablet, Take 200 mg by mouth, Disp: , Rfl:      levonorgestrel (MIRENA) 20 MCG/24HR IUD, 1 each by Intrauterine route, Disp: , Rfl:      nitroGLYcerin (NITROSTAT) 0.4 MG sublingual tablet, Place 0.4 mg under the tongue, Disp: , Rfl:      Omega-3 Fatty Acids (FISH OIL PEARLS) 183.33 MG CAPS, Take 1 capsule by mouth, Disp: , Rfl:      omeprazole (PRILOSEC) 40 MG DR capsule, Take 1 capsule (40 mg total) by mouth 2 (two) times a day., Disp: 60 capsule, Rfl: PRN     Respiratory Therapy Supplies (CARETOUCH 2 CPAP HOSE ) MISC, CPAP for home use at pressure of 10cmw.  Heated humidifier, Heated humidifier x1 q5 yr , Humidifier chamber x1 q6 mo, Full face mask x1 q3 mo, Full face cushion q2 mo, Heated tubing x1 q3 mo, Headgear x1 q6 mo, Chinstrap x1 q6 mo, Filters: Disposable x2 q mo, Non-disposable filters x1 q6 mo, Length of Need: 99 months, Frequency of use: Daily., Disp: , Rfl:      Respiratory Therapy Supplies (CARETOUCH UNIVERSL CPAP FILTER) MISC, CPAP for home use at pressure of 10cmw.  Heated humidifier, Heated humidifier x1 q5 yr , Humidifier chamber x1 q6 mo, Full face mask x1 q3 mo, Full face cushion q2 mo, Heated tubing x1 q3 mo, Headgear x1 q6 mo, Chinstrap x1 q6 mo, Filters: Disposable x2 q mo, Non-disposable filters x1 q6 mo, Length of Need: 99 months, Frequency of use: Daily., Disp: , Rfl:      rosuvastatin (CRESTOR) 40 MG tablet, , Disp: , Rfl:      Semaglutide-Weight Management  (WEGOVY) 0.25 MG/0.5ML pen, Inject 0.25 mg Subcutaneous once a week, Disp: 2 mL, Rfl: 1     Semaglutide-Weight Management (WEGOVY) 0.5 MG/0.5ML pen, Inject 0.5 mg Subcutaneously once a week for 28 days, Disp: 4 mL, Rfl: 0     Sharps Container (SHARPS-A-GATOR LOCKING BRACKET) MISC, CPAP for home use at pressure of 9  Heated humidifier x1, Humidifier chamber x1, Heated tubing x1, Full face mask with cushion x1, Headgear x1, Filters: Disposable x1 pack, Reusable x1pk, Length of Need: 99 months, Frequency of use: Daily, Disp: , Rfl:      traZODone (DESYREL) 50 MG tablet, Take 50 mg by mouth, Disp: , Rfl:      vitamin B complex with vitamin C (VITAMIN  B COMPLEX) tablet, Take 1 tablet by mouth, Disp: , Rfl:      cyclobenzaprine (FLEXERIL) 5 MG tablet, Take 1-2 tablets (5-10 mg) by mouth 3 times daily as needed for muscle spasms (Patient not taking: Reported on 7/28/2023), Disp: 20 tablet, Rfl: 3    Plan: Recommend counting steps, consider waking the dog. Stress has been OK. Sober for 1 year. Counseling and therapy. Let us know if wanting to go up to 1.0mg after 2 months  on 0.5 (can add the left over 0.25mg to the 0.5mg for an intermediate dose os 0.75 for a smoother transition if wanted). Ensure resistance to keep muscle. Ellipical works for that.    Return in about 3 months (around 8/3/2024).    Bariatric Surgery Review     Interim History/LifeChanges: Maintaining a 59# weight loss. Things are going well. Started Wegovy about 5 weeks ago. No nausea or constipation. Minimal apetite control. Was at 0.5mg before and did feel appetite suppression. Sleeping solid 8 hours. Exercise routine was ellipical for 5 minutes a couple of times a week and walking a couple of times a week. Her  is a great motivator. Not counting steps. Walking her dog now. Hydrating, getting 3 meals with protein. Fruit and veggies daily. Eggbake weekly    Patient Concerns:   Appetite (1-10): OK  GERD: on PPI    Medication changes: no    Vitamin  Intake:   B-12   SL   MVI  Edson's 2/d   Vitamin D  5,000   Calcium   dietary     Other                LABS: ordered    Nausea manageable  Vomiting no  Constipation no  Diarrhea no  Rashes no  Hair Loss no  Calf tenderness no  Breathing difficulty no  Reactive Hypoglycemia no  Light Headedness no   Moods stable    12 point ROS as above and otherwise negative      Habits:  Alcohol: none for 1 year  Tobacco: no  Caffeine yes  NSAIDS no  Exercise Routine: walking the dog some, not counting steps. Had been getting ellipitcal resistance  3 meals/day yes   Protein first yes  60grams/day  Water Separate from meals yes  Calorie Containing Beverages no  Restaurant eating/wk <2  Sleeping well  Stress low  CPAP: NA  Contraception: PM  DEXA:has not had. No family history.     Social History     Social History     Socioeconomic History     Marital status:      Spouse name: Not on file     Number of children: Not on file     Years of education: Not on file     Highest education level: Not on file   Occupational History     Not on file   Tobacco Use     Smoking status: Never     Smokeless tobacco: Never   Vaping Use     Vaping status: Never Used   Substance and Sexual Activity     Alcohol use: No     Comment: a bottle of wine a day      Drug use: No     Sexual activity: Yes     Partners: Male     Birth control/protection: I.U.D., Female Surgical   Other Topics Concern     Parent/sibling w/ CABG, MI or angioplasty before 65F 55M? 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[employer] [place]      Lives at home with her      Social Determinants of Health     Financial Resource Strain: High Risk (12/27/2021)    [facility]    Financial Resource Strain      Difficulty of Paying Living Expenses: Not on file      Difficulty of Paying Living Expenses: Not on file   Food Insecurity: No Food  Insecurity (3/17/2021)    Hunger Vital Sign      Worried About Running Out of Food in the Last Year: Never true      Ran Out of Food in the Last Year: Never true   Transportation Needs: No Transportation Needs (3/17/2021)    PRAPARE - Transportation      Lack of Transportation (Medical): No      Lack of Transportation (Non-Medical): No   Physical Activity: Not on file   Stress: Not on file   Social Connections: Unknown (1/16/2023)    Received from [employer] [facility], [employer] [facility] [employer] [facility]    Social Connections      Frequency of Communication with Friends and Family: Not on file   Interpersonal Safety: Not on file   Housing Stability: Not on file       Past Medical History     Past Medical History:   Diagnosis Date     Anxiety      Bipolar disorder (H)      Bipolar disorder (H)      Cardiomyopathy (H)      Carpal tunnel syndrome      Coronary artery disease      Coronary artery disease 04/01/2013    2 stents     Coronary atherosclerosis      Depression      Depressive disorder      Heart attack (H) 2013     Heart attack (H)      Heart disease      Heartburn      History of anesthesia complications      Hyperlipidemia      Hypertension      Hypertension      Migraine      Morbid obesity with BMI of 40.0-44.9, adult (H)      Myocardial infarction (H) 20013     Obsessive compulsive disorder      OCD (obsessive compulsive disorder)      NAVA on CPAP      Plantar fasciitis      PONV (postoperative nausea and vomiting)      Sleep apnea     cpap     Stress incontinence      Problem List     Patient Active Problem List   Diagnosis     Bipolar disorder (H)     Coronary atherosclerosis of native coronary artery     Esophageal reflux     Essential hypertension     Female stress incontinence     Gastrointestinal problem     Gastrojejunal ulcer with perforation (H)     Heartburn     Internal hernia     Knee pain     Marginal ulcer     Migraine     Mixed hyperlipidemia     Moderate  episode of recurrent major depressive disorder (H)     Morbid obesity (H)     Obsessive-compulsive disorder     Plantar fasciitis     Pneumoperitoneum     NAVA (obstructive sleep apnea)     Small bowel obstruction (H)     Stented coronary artery     DENICE (generalized anxiety disorder)     History of MI (myocardial infarction)     Upper back pain     Pulmonary nodules     Medications       Current Outpatient Medications:      acetaminophen (TYLENOL) 500 MG tablet, Take 1,000 mg by mouth, Disp: , Rfl:      ALPRAZolam (XANAX) 0.25 MG tablet, , Disp: , Rfl:      ARIPiprazole (ABILIFY) 10 MG tablet, Take 1 tablet (10 mg) by mouth daily, Disp: 90 tablet, Rfl: 0     ARIPiprazole (ABILIFY) 5 MG tablet, , Disp: , Rfl:      aspirin (ASA) 81 MG EC tablet, Take 81 mg by mouth, Disp: , Rfl:      atorvastatin (LIPITOR) 40 MG tablet, Take 1 tablet (40 mg) by mouth daily, Disp: 90 tablet, Rfl: 3     buPROPion (WELLBUTRIN XL) 300 MG 24 hr tablet, Take 1 tablet (300 mg) by mouth every morning, Disp: 90 tablet, Rfl: 1     calcium citrate-vitamin D (CITRACAL) 315-250 MG-UNIT TABS per tablet, Take by mouth 2 times daily, Disp: , Rfl:      cholecalciferol 50 MCG (2000 UT) tablet, Take 2,000 Units by mouth, Disp: , Rfl:      DULoxetine (CYMBALTA) 60 MG capsule, Take 120 mg by mouth, Disp: , Rfl:      EPINEPHrine (ANY BX GENERIC EQUIV) 0.3 MG/0.3ML injection 2-pack, Inject the contents of one device into the muscle as needed for anaphylaxis; may repeat one time in 5-15 minutes if response to initial dose is inadequate if needed for tongue swelling, SOB. Go to ER immediately after use for possible rebound, Disp: 2 each, Rfl: 1     lamoTRIgine (LAMICTAL) 200 MG tablet, Take 200 mg by mouth, Disp: , Rfl:      levonorgestrel (MIRENA) 20 MCG/24HR IUD, 1 each by Intrauterine route, Disp: , Rfl:      nitroGLYcerin (NITROSTAT) 0.4 MG sublingual tablet, Place 0.4 mg under the tongue, Disp: , Rfl:      Omega-3 Fatty Acids (FISH OIL PEARLS) 183.33 MG  CAPS, Take 1 capsule by mouth, Disp: , Rfl:      omeprazole (PRILOSEC) 40 MG DR capsule, Take 1 capsule (40 mg total) by mouth 2 (two) times a day., Disp: 60 capsule, Rfl: PRN     Respiratory Therapy Supplies (CARETOUCH 2 CPAP HOSE ) MISC, CPAP for home use at pressure of 10cmw.  Heated humidifier, Heated humidifier x1 q5 yr , Humidifier chamber x1 q6 mo, Full face mask x1 q3 mo, Full face cushion q2 mo, Heated tubing x1 q3 mo, Headgear x1 q6 mo, Chinstrap x1 q6 mo, Filters: Disposable x2 q mo, Non-disposable filters x1 q6 mo, Length of Need: 99 months, Frequency of use: Daily., Disp: , Rfl:      Respiratory Therapy Supplies (CARETOUCH UNIVERSL CPAP FILTER) MISC, CPAP for home use at pressure of 10cmw.  Heated humidifier, Heated humidifier x1 q5 yr , Humidifier chamber x1 q6 mo, Full face mask x1 q3 mo, Full face cushion q2 mo, Heated tubing x1 q3 mo, Headgear x1 q6 mo, Chinstrap x1 q6 mo, Filters: Disposable x2 q mo, Non-disposable filters x1 q6 mo, Length of Need: 99 months, Frequency of use: Daily., Disp: , Rfl:      rosuvastatin (CRESTOR) 40 MG tablet, , Disp: , Rfl:      Semaglutide-Weight Management (WEGOVY) 0.25 MG/0.5ML pen, Inject 0.25 mg Subcutaneous once a week, Disp: 2 mL, Rfl: 1     Semaglutide-Weight Management (WEGOVY) 0.5 MG/0.5ML pen, Inject 0.5 mg Subcutaneously once a week for 28 days, Disp: 4 mL, Rfl: 0     Sharps Container (SHARPS-A-GATOR LOCKING BRACKET) MISC, CPAP for home use at pressure of 9  Heated humidifier x1, Humidifier chamber x1, Heated tubing x1, Full face mask with cushion x1, Headgear x1, Filters: Disposable x1 pack, Reusable x1pk, Length of Need: 99 months, Frequency of use: Daily, Disp: , Rfl:      traZODone (DESYREL) 50 MG tablet, Take 50 mg by mouth, Disp: , Rfl:      vitamin B complex with vitamin C (VITAMIN  B COMPLEX) tablet, Take 1 tablet by mouth, Disp: , Rfl:      cyclobenzaprine (FLEXERIL) 5 MG tablet, Take 1-2 tablets (5-10 mg) by mouth 3 times daily as needed for  "muscle spasms (Patient not taking: Reported on 7/28/2023), Disp: 20 tablet, Rfl: 3   Surgical History     Past Surgical History  She has a past surgical history that includes Teri En Y bowel (01/23/2019); gastric bypass; hernia repair; Stomach surgery; Breast surgery; tubal ligation; Toe Surgery; Cholecystectomy; Stent; [place] Tooth Extraction; other surgical history; tubal ligation (2004); Cholecystectomy (2010); Mammoplasty reduction (1998); Biopsy breast (2012); Angioplasty (2013); Cystocele Repair; Cardiac surgery; Hysteroscopy Diagnostic; Pr Lap Gastric Bypass/Teri-En-Y (N/A, 1/29/2019); Pr Lap,Diagnostic Abdomen (N/A, 5/21/2019); Pr Lap,Diagnostic Abdomen (N/A, 5/7/2020); Gastrectomy; vascular surgery; and Abdomen surgery.    Objective-Exam     Constitutional:  Ht 1.778 m (5' 10\")   Wt 113.4 kg (250 lb)   BMI 35.87 kg/m    General:  Pleasant and in no acute distress     Psychiatric: alert and oriented X3, mood and affect normal    Counseling     We reviewed the important post op bariatric recommendations:  -eating 3 meals daily  -eating protein first, getting >60gm protein daily  -eating slowly, chewing food well  -avoiding/limiting calorie containing beverages  -drinking water 15-30 minutes before or after meals  -choosing wheat, not white with breads, crackers, pastas, noemy, bagels, tortillas, rice  -limiting restaurant or cafeteria eating to twice a week or less    We discussed the importance of restorative sleep and stress management in maintaining a healthy weight.  We discussed the National Weight Control Registry healthy weight maintenance strategies and ways to optimize metabolism.  We discussed the importance of physical activity including cardiovascular and strength training in maintaining a healthier weight.    We discussed the importance of life-long vitamin supplementation and life-long  follow-up.    Kristine was reminded that, to avoid marginal ulcers she should avoid tobacco at all, alcohol in " excess, caffeine in excess, and NSAIDS (unless indicated for cardioprotection or othewise and opposed by a PPI).    Sherly Zamora MD, MD, [facility].  5/3/2024  9:12 AM  Total time spent on the date of this encounter doing: chart review, review of test results, patient visit, physical exam, education, counseling, developing plan of care, and documenting = 30 minutes.      Again, thank you for allowing me to participate in the care of your patient.        Sincerely,        Sherly Zamora MD

## 2024-05-03 NOTE — NURSING NOTE
Is the patient currently in the state of [place]? YES    Visit mode:VIDEO    If the visit is dropped, the patient can be reconnected by: VIDEO VISIT: Text to cell phone:   Telephone Information:   Mobile [phone]       Will anyone else be joining the visit? NO  (If patient encounters technical issues they should call [phone]    How would you like to obtain your AVS? MyChart    Are changes needed to the allergy or medication list? No    Are refills needed on medications prescribed by this physician? NO     Reason for visit: RECHECK    Wt other than 24 hrs:  last week  Pain more than one location:  no  Yumiko LEE

## 2024-05-03 NOTE — PATIENT INSTRUCTIONS
[facility] Bariatric Basics    Remember to:    -Eat 3 meals a day (not 2, not 5) Chew your food well/SLOW down  -Eat your protein first  -Be a water drinker/Minize liquid calories (no regular pop, no juice) skim or 1% milk OK  -Sleep 7-8 hours each night. Address sleep if problematic  -Stress management is important. Address if problematic  -Move-8000 steps daily Muscle: maintain your muscle mass (strength training 2X/wk)  -Wheat, not white (bread, pasta, crackers, noemy, bagels, tortillas, rice)  -Limit restaurant, cafeteria, take out, drive through to 2 times per week or less  -Minimize caffeine, alcohol, and night-time snacking  -Consider keeping a food diary (i.e. My Fitness Pal, Lose It, or other food tracker)  -Follow up with the dietitian      **Some lean proteins: chicken, turkey, tuna, salmon, crab, fish, shrimp, scallops, lobster, lean cuts of beef and pork, luncheon meats, veggie burgers, beans (black, lima, garbanzo, elena, kidney, refried), chile, cottage cheese, string cheese, other cheese, eggs, tofu, peanut butter, nuts, vegan crumbles, greek yogurt

## 2024-05-03 NOTE — PROGRESS NOTES
Virtual Visit Details    Type of service:  Video Visit     Originating Location (pt. Location): Home    Distant Location (provider location):  On-site  Platform used for Video Visit: [facility]    Bariatric Follow Up Visit with a History of Previous Bariatric Surgery     Date of visit: 5/3/2024  Physician: Sherly Zamora MD, MD  Primary Care Provider:  Noni Cuellar   53 year old  female    Date of Surgery: 1/29/2019  Initial Weight: 309#  Initial BMI: 44.34  Today's Weight:   Wt Readings from Last 1 Encounters:   05/03/24 113.4 kg (250 lb)     Body mass index is 35.87 kg/m .      Assessment and Plan     Assessment: Kristine is a 53 year old year old female who is 4 yrs s/p  Teri en Y Gastric Bypass with Dr. Zhane Scott feels as if she had achieved the goals she hoped to accomplish through bariatric surgery and weight loss.    Encounter Diagnosis   Name Primary?    Postoperative malabsorption Yes         Current Outpatient Medications:     acetaminophen (TYLENOL) 500 MG tablet, Take 1,000 mg by mouth, Disp: , Rfl:     ALPRAZolam (XANAX) 0.25 MG tablet, , Disp: , Rfl:     ARIPiprazole (ABILIFY) 10 MG tablet, Take 1 tablet (10 mg) by mouth daily, Disp: 90 tablet, Rfl: 0    ARIPiprazole (ABILIFY) 5 MG tablet, , Disp: , Rfl:     aspirin (ASA) 81 MG EC tablet, Take 81 mg by mouth, Disp: , Rfl:     atorvastatin (LIPITOR) 40 MG tablet, Take 1 tablet (40 mg) by mouth daily, Disp: 90 tablet, Rfl: 3    buPROPion (WELLBUTRIN XL) 300 MG 24 hr tablet, Take 1 tablet (300 mg) by mouth every morning, Disp: 90 tablet, Rfl: 1    calcium citrate-vitamin D (CITRACAL) 315-250 MG-UNIT TABS per tablet, Take by mouth 2 times daily, Disp: , Rfl:     cholecalciferol 50 MCG (2000 UT) tablet, Take 2,000 Units by mouth, Disp: , Rfl:     DULoxetine (CYMBALTA) 60 MG capsule, Take 120 mg by mouth, Disp: , Rfl:     EPINEPHrine (ANY BX GENERIC EQUIV) 0.3 MG/0.3ML injection 2-pack, Inject the contents of one device into the  muscle as needed for anaphylaxis; may repeat one time in 5-15 minutes if response to initial dose is inadequate if needed for tongue swelling, SOB. Go to ER immediately after use for possible rebound, Disp: 2 each, Rfl: 1    lamoTRIgine (LAMICTAL) 200 MG tablet, Take 200 mg by mouth, Disp: , Rfl:     levonorgestrel (MIRENA) 20 MCG/24HR IUD, 1 each by Intrauterine route, Disp: , Rfl:     nitroGLYcerin (NITROSTAT) 0.4 MG sublingual tablet, Place 0.4 mg under the tongue, Disp: , Rfl:     Omega-3 Fatty Acids (FISH OIL PEARLS) 183.33 MG CAPS, Take 1 capsule by mouth, Disp: , Rfl:     omeprazole (PRILOSEC) 40 MG DR capsule, Take 1 capsule (40 mg total) by mouth 2 (two) times a day., Disp: 60 capsule, Rfl: PRN    Respiratory Therapy Supplies (CARETOUCH 2 CPAP HOSE ) MISC, CPAP for home use at pressure of 10cmw.  Heated humidifier, Heated humidifier x1 q5 yr , Humidifier chamber x1 q6 mo, Full face mask x1 q3 mo, Full face cushion q2 mo, Heated tubing x1 q3 mo, Headgear x1 q6 mo, Chinstrap x1 q6 mo, Filters: Disposable x2 q mo, Non-disposable filters x1 q6 mo, Length of Need: 99 months, Frequency of use: Daily., Disp: , Rfl:     Respiratory Therapy Supplies (CARETOUCH UNIVERSL CPAP FILTER) MISC, CPAP for home use at pressure of 10cmw.  Heated humidifier, Heated humidifier x1 q5 yr , Humidifier chamber x1 q6 mo, Full face mask x1 q3 mo, Full face cushion q2 mo, Heated tubing x1 q3 mo, Headgear x1 q6 mo, Chinstrap x1 q6 mo, Filters: Disposable x2 q mo, Non-disposable filters x1 q6 mo, Length of Need: 99 months, Frequency of use: Daily., Disp: , Rfl:     rosuvastatin (CRESTOR) 40 MG tablet, , Disp: , Rfl:     Semaglutide-Weight Management (WEGOVY) 0.25 MG/0.5ML pen, Inject 0.25 mg Subcutaneous once a week, Disp: 2 mL, Rfl: 1    Semaglutide-Weight Management (WEGOVY) 0.5 MG/0.5ML pen, Inject 0.5 mg Subcutaneously once a week for 28 days, Disp: 4 mL, Rfl: 0    Sharps Container (SHARPS-A-GATOR LOCKING BRACKET) MISC, CPAP  for home use at pressure of 9  Heated humidifier x1, Humidifier chamber x1, Heated tubing x1, Full face mask with cushion x1, Headgear x1, Filters: Disposable x1 pack, Reusable x1pk, Length of Need: 99 months, Frequency of use: Daily, Disp: , Rfl:     traZODone (DESYREL) 50 MG tablet, Take 50 mg by mouth, Disp: , Rfl:     vitamin B complex with vitamin C (VITAMIN  B COMPLEX) tablet, Take 1 tablet by mouth, Disp: , Rfl:     cyclobenzaprine (FLEXERIL) 5 MG tablet, Take 1-2 tablets (5-10 mg) by mouth 3 times daily as needed for muscle spasms (Patient not taking: Reported on 7/28/2023), Disp: 20 tablet, Rfl: 3    Plan: Recommend counting steps, consider waking the dog. Stress has been OK. Sober for 1 year. Counseling and therapy. Let us know if wanting to go up to 1.0mg after 2 months  on 0.5 (can add the left over 0.25mg to the 0.5mg for an intermediate dose os 0.75 for a smoother transition if wanted). Ensure resistance to keep muscle. Ellipical works for that.    Return in about 3 months (around 8/3/2024).    Bariatric Surgery Review     Interim History/LifeChanges: Maintaining a 59# weight loss. Things are going well. Started Wegovy about 5 weeks ago. No nausea or constipation. Minimal apetite control. Was at 0.5mg before and did feel appetite suppression. Sleeping solid 8 hours. Exercise routine was ellipical for 5 minutes a couple of times a week and walking a couple of times a week. Her  is a great motivator. Not counting steps. Walking her dog now. Hydrating, getting 3 meals with protein. Fruit and veggies daily. Eggbake weekly    Patient Concerns:   Appetite (1-10): OK  GERD: on PPI    Medication changes: no    Vitamin Intake:   B-12   SL   MVI  Edson's 2/d   Vitamin D  5,000   Calcium   dietary     Other                LABS: ordered    Nausea manageable  Vomiting no  Constipation no  Diarrhea no  Rashes no  Hair Loss no  Calf tenderness no  Breathing difficulty no  Reactive Hypoglycemia no  Light  Headedness no   Moods stable    12 point ROS as above and otherwise negative      Habits:  Alcohol: none for 1 year  Tobacco: no  Caffeine yes  NSAIDS no  Exercise Routine: walking the dog some, not counting steps. Had been getting ellipitcal resistance  3 meals/day yes   Protein first yes  60grams/day  Water Separate from meals yes  Calorie Containing Beverages no  Restaurant eating/wk <2  Sleeping well  Stress low  CPAP: NA  Contraception: PM  DEXA:has not had. No family history.     Social History     Social History     Socioeconomic History    Marital status:      Spouse name: Not on file    Number of children: Not on file    Years of education: Not on file    Highest education level: Not on file   Occupational History    Not on file   Tobacco Use    Smoking status: Never    Smokeless tobacco: Never   Vaping Use    Vaping status: Never Used   Substance and Sexual Activity    Alcohol use: No     Comment: a bottle of wine a day     Drug use: No    Sexual activity: Yes     Partners: Male     Birth control/protection: I.U.D., Female Surgical   Other Topics Concern    Parent/sibling w/ CABG, MI or angioplasty before 65F 55M? 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 [place]      Lives at home with her      Social Determinants of Health     Financial Resource Strain: High Risk (12/27/2021)    [facility]    Financial Resource Strain     Difficulty of Paying Living Expenses: Not on file     Difficulty of Paying Living Expenses: Not on file   Food Insecurity: No Food Insecurity (3/17/2021)    Hunger Vital Sign     Worried About Running Out of Food in the Last Year: Never true     Ran Out of Food in the Last Year: Never true   Transportation Needs: No Transportation Needs (3/17/2021)    PRAPARE - Transportation     Lack of Transportation (Medical): No     Lack of  Transportation (Non-Medical): No   Physical Activity: Not on file   Stress: Not on file   Social Connections: Unknown (1/16/2023)    [facility], [facility] [facility]    Social Connections     Frequency of Communication with Friends and Family: Not on file   Interpersonal Safety: Not on file   Housing Stability: Not on file       Past Medical History     Past Medical History:   Diagnosis Date    Anxiety     Bipolar disorder (H)     Bipolar disorder (H)     Cardiomyopathy (H)     Carpal tunnel syndrome     Coronary artery disease     Coronary artery disease 04/01/2013    2 stents    Coronary atherosclerosis     Depression     Depressive disorder     Heart attack (H) 2013    Heart attack (H)     Heart disease     Heartburn     History of anesthesia complications     Hyperlipidemia     Hypertension     Hypertension     Migraine     Morbid obesity with BMI of 40.0-44.9, adult (H)     Myocardial infarction (H) 20013    Obsessive compulsive disorder     OCD (obsessive compulsive disorder)     NAVA on CPAP     Plantar fasciitis     PONV (postoperative nausea and vomiting)     Sleep apnea     cpap    Stress incontinence      Problem List     Patient Active Problem List   Diagnosis    Bipolar disorder (H)    Coronary atherosclerosis of native coronary artery    Esophageal reflux    Essential hypertension    Female stress incontinence    Gastrointestinal problem    Gastrojejunal ulcer with perforation (H)    Heartburn    Internal hernia    Knee pain    Marginal ulcer    Migraine    Mixed hyperlipidemia    Moderate episode of recurrent major depressive disorder (H)    Morbid obesity (H)    Obsessive-compulsive disorder    Plantar fasciitis    Pneumoperitoneum    NAVA (obstructive sleep apnea)    Small bowel obstruction (H)    Stented coronary artery    DENICE (generalized anxiety disorder)    History of MI (myocardial infarction)    Upper back pain    Pulmonary  nodules     Medications       Current Outpatient Medications:     acetaminophen (TYLENOL) 500 MG tablet, Take 1,000 mg by mouth, Disp: , Rfl:     ALPRAZolam (XANAX) 0.25 MG tablet, , Disp: , Rfl:     ARIPiprazole (ABILIFY) 10 MG tablet, Take 1 tablet (10 mg) by mouth daily, Disp: 90 tablet, Rfl: 0    ARIPiprazole (ABILIFY) 5 MG tablet, , Disp: , Rfl:     aspirin (ASA) 81 MG EC tablet, Take 81 mg by mouth, Disp: , Rfl:     atorvastatin (LIPITOR) 40 MG tablet, Take 1 tablet (40 mg) by mouth daily, Disp: 90 tablet, Rfl: 3    buPROPion (WELLBUTRIN XL) 300 MG 24 hr tablet, Take 1 tablet (300 mg) by mouth every morning, Disp: 90 tablet, Rfl: 1    calcium citrate-vitamin D (CITRACAL) 315-250 MG-UNIT TABS per tablet, Take by mouth 2 times daily, Disp: , Rfl:     cholecalciferol 50 MCG (2000 UT) tablet, Take 2,000 Units by mouth, Disp: , Rfl:     DULoxetine (CYMBALTA) 60 MG capsule, Take 120 mg by mouth, Disp: , Rfl:     EPINEPHrine (ANY BX GENERIC EQUIV) 0.3 MG/0.3ML injection 2-pack, Inject the contents of one device into the muscle as needed for anaphylaxis; may repeat one time in 5-15 minutes if response to initial dose is inadequate if needed for tongue swelling, SOB. Go to ER immediately after use for possible rebound, Disp: 2 each, Rfl: 1    lamoTRIgine (LAMICTAL) 200 MG tablet, Take 200 mg by mouth, Disp: , Rfl:     levonorgestrel (MIRENA) 20 MCG/24HR IUD, 1 each by Intrauterine route, Disp: , Rfl:     nitroGLYcerin (NITROSTAT) 0.4 MG sublingual tablet, Place 0.4 mg under the tongue, Disp: , Rfl:     Omega-3 Fatty Acids (FISH OIL PEARLS) 183.33 MG CAPS, Take 1 capsule by mouth, Disp: , Rfl:     omeprazole (PRILOSEC) 40 MG DR capsule, Take 1 capsule (40 mg total) by mouth 2 (two) times a day., Disp: 60 capsule, Rfl: PRN    Respiratory Therapy Supplies (CARETOUCH 2 CPAP HOSE ) MISC, CPAP for home use at pressure of 10cmw.  Heated humidifier, Heated humidifier x1 q5 yr , Humidifier chamber x1 q6 mo, Full face mask  x1 q3 mo, Full face cushion q2 mo, Heated tubing x1 q3 mo, Headgear x1 q6 mo, Chinstrap x1 q6 mo, Filters: Disposable x2 q mo, Non-disposable filters x1 q6 mo, Length of Need: 99 months, Frequency of use: Daily., Disp: , Rfl:     Respiratory Therapy Supplies ([facility] CPAP FILTER) MISC, CPAP for home use at pressure of 10cmw.  Heated humidifier, Heated humidifier x1 q5 yr , Humidifier chamber x1 q6 mo, Full face mask x1 q3 mo, Full face cushion q2 mo, Heated tubing x1 q3 mo, Headgear x1 q6 mo, Chinstrap x1 q6 mo, Filters: Disposable x2 q mo, Non-disposable filters x1 q6 mo, Length of Need: 99 months, Frequency of use: Daily., Disp: , Rfl:     rosuvastatin (CRESTOR) 40 MG tablet, , Disp: , Rfl:     Semaglutide-Weight Management (WEGOVY) 0.25 MG/0.5ML pen, Inject 0.25 mg Subcutaneous once a week, Disp: 2 mL, Rfl: 1    Semaglutide-Weight Management (WEGOVY) 0.5 MG/0.5ML pen, Inject 0.5 mg Subcutaneously once a week for 28 days, Disp: 4 mL, Rfl: 0    Sharps Container (SHARPS-A-GATOR LOCKING BRACKET) MISC, CPAP for home use at pressure of 9  Heated humidifier x1, Humidifier chamber x1, Heated tubing x1, Full face mask with cushion x1, Headgear x1, Filters: Disposable x1 pack, Reusable x1pk, Length of Need: 99 months, Frequency of use: Daily, Disp: , Rfl:     traZODone (DESYREL) 50 MG tablet, Take 50 mg by mouth, Disp: , Rfl:     vitamin B complex with vitamin C (VITAMIN  B COMPLEX) tablet, Take 1 tablet by mouth, Disp: , Rfl:     cyclobenzaprine (FLEXERIL) 5 MG tablet, Take 1-2 tablets (5-10 mg) by mouth 3 times daily as needed for muscle spasms (Patient not taking: Reported on 7/28/2023), Disp: 20 tablet, Rfl: 3   Surgical History     Past Surgical History  She has a past surgical history that includes Teri En Y bowel (01/23/2019); gastric bypass; hernia repair; Stomach surgery; Breast surgery; tubal ligation; Toe Surgery; Cholecystectomy; Stent; [place] Tooth Extraction; other surgical history; tubal ligation  "(2004); Cholecystectomy (2010); Mammoplasty reduction (1998); Biopsy breast (2012); Angioplasty (2013); Cystocele Repair; Cardiac surgery; Hysteroscopy Diagnostic; Pr Lap Gastric Bypass/Teri-En-Y (N/A, 1/29/2019); Pr Lap,Diagnostic Abdomen (N/A, 5/21/2019); Pr Lap,Diagnostic Abdomen (N/A, 5/7/2020); Gastrectomy; vascular surgery; and Abdomen surgery.    Objective-Exam     Constitutional:  Ht 1.778 m (5' 10\")   Wt 113.4 kg (250 lb)   BMI 35.87 kg/m    General:  Pleasant and in no acute distress     Psychiatric: alert and oriented X3, mood and affect normal    Counseling     We reviewed the important post op bariatric recommendations:  -eating 3 meals daily  -eating protein first, getting >60gm protein daily  -eating slowly, chewing food well  -avoiding/limiting calorie containing beverages  -drinking water 15-30 minutes before or after meals  -choosing wheat, not white with breads, crackers, pastas, noemy, bagels, tortillas, rice  -limiting restaurant or cafeteria eating to twice a week or less    We discussed the importance of restorative sleep and stress management in maintaining a healthy weight.  We discussed the National Weight Control Registry healthy weight maintenance strategies and ways to optimize metabolism.  We discussed the importance of physical activity including cardiovascular and strength training in maintaining a healthier weight.    We discussed the importance of life-long vitamin supplementation and life-long  follow-up.    Kristine was reminded that, to avoid marginal ulcers she should avoid tobacco at all, alcohol in excess, caffeine in excess, and NSAIDS (unless indicated for cardioprotection or othewise and opposed by a PPI).    Sherly Zamora MD, MD, FAAFP  [facility].  5/3/2024  9:12 AM  Total time spent on the date of this encounter doing: chart review, review of test results, patient visit, physical exam, education, counseling, developing plan of care, and " documenting = 30 minutes.

## 2024-05-07 ENCOUNTER — TELEPHONE (OUTPATIENT)
Dept: SURGERY | Facility: CLINIC | Age: 54
End: 2024-05-07
Payer: COMMERCIAL

## 2024-06-03 DIAGNOSIS — E66.01 MORBID OBESITY (H): ICD-10-CM

## 2024-06-04 RX ORDER — SEMAGLUTIDE 0.5 MG/.5ML
0.5 INJECTION, SOLUTION SUBCUTANEOUS
Qty: 2 ML | Refills: 0 | Status: SHIPPED | OUTPATIENT
Start: 2024-06-04 | End: 2024-07-01

## 2024-06-05 ENCOUNTER — LAB (OUTPATIENT)
Dept: LAB | Facility: CLINIC | Age: 54
End: 2024-06-05
Payer: COMMERCIAL

## 2024-06-05 DIAGNOSIS — K91.2 POSTOPERATIVE MALABSORPTION: ICD-10-CM

## 2024-06-05 LAB
ALBUMIN SERPL BCG-MCNC: 4.4 G/DL (ref 3.5–5.2)
ALP SERPL-CCNC: 92 U/L (ref 40–150)
ALT SERPL W P-5'-P-CCNC: 125 U/L (ref 0–50)
ANION GAP SERPL CALCULATED.3IONS-SCNC: 10 MMOL/L (ref 7–15)
AST SERPL W P-5'-P-CCNC: 75 U/L (ref 0–45)
BILIRUB SERPL-MCNC: 0.4 MG/DL
BUN SERPL-MCNC: 12.2 MG/DL (ref 6–20)
CALCIUM SERPL-MCNC: 9.9 MG/DL (ref 8.6–10)
CHLORIDE SERPL-SCNC: 104 MMOL/L (ref 98–107)
CREAT SERPL-MCNC: 1.14 MG/DL (ref 0.51–0.95)
DEPRECATED HCO3 PLAS-SCNC: 30 MMOL/L (ref 22–29)
EGFRCR SERPLBLD CKD-EPI 2021: 57 ML/MIN/1.73M2
ERYTHROCYTE [DISTWIDTH] IN BLOOD BY AUTOMATED COUNT: 12.2 % (ref 10–15)
FERRITIN SERPL-MCNC: 137 NG/ML (ref 11–328)
FOLATE SERPL-MCNC: >40 NG/ML (ref 4.6–34.8)
GLUCOSE SERPL-MCNC: 72 MG/DL (ref 70–99)
HBA1C MFR BLD: 5.4 % (ref 0–5.6)
HCT VFR BLD AUTO: 40.3 % (ref 35–47)
HGB BLD-MCNC: 13.4 G/DL (ref 11.7–15.7)
MCH RBC QN AUTO: 31.3 PG (ref 26.5–33)
MCHC RBC AUTO-ENTMCNC: 33.3 G/DL (ref 31.5–36.5)
MCV RBC AUTO: 94 FL (ref 78–100)
PLATELET # BLD AUTO: 205 10E3/UL (ref 150–450)
POTASSIUM SERPL-SCNC: 4.7 MMOL/L (ref 3.4–5.3)
PROT SERPL-MCNC: 6.8 G/DL (ref 6.4–8.3)
PTH-INTACT SERPL-MCNC: 19 PG/ML (ref 15–65)
RBC # BLD AUTO: 4.28 10E6/UL (ref 3.8–5.2)
SODIUM SERPL-SCNC: 144 MMOL/L (ref 135–145)
VIT D+METAB SERPL-MCNC: 65 NG/ML (ref 20–50)
WBC # BLD AUTO: 4.2 10E3/UL (ref 4–11)

## 2024-06-05 PROCEDURE — 84590 ASSAY OF VITAMIN A: CPT | Mod: 90

## 2024-06-05 PROCEDURE — 84630 ASSAY OF ZINC: CPT | Mod: 90

## 2024-06-05 PROCEDURE — 82728 ASSAY OF FERRITIN: CPT

## 2024-06-05 PROCEDURE — 82306 VITAMIN D 25 HYDROXY: CPT

## 2024-06-05 PROCEDURE — 85027 COMPLETE CBC AUTOMATED: CPT

## 2024-06-05 PROCEDURE — 36415 COLL VENOUS BLD VENIPUNCTURE: CPT

## 2024-06-05 PROCEDURE — 83036 HEMOGLOBIN GLYCOSYLATED A1C: CPT

## 2024-06-05 PROCEDURE — 80053 COMPREHEN METABOLIC PANEL: CPT

## 2024-06-05 PROCEDURE — 84425 ASSAY OF VITAMIN B-1: CPT | Mod: 90

## 2024-06-05 PROCEDURE — 99000 SPECIMEN HANDLING OFFICE-LAB: CPT

## 2024-06-05 PROCEDURE — 83970 ASSAY OF PARATHORMONE: CPT

## 2024-06-05 PROCEDURE — 82746 ASSAY OF FOLIC ACID SERUM: CPT

## 2024-06-06 LAB — ZINC SERPL-MCNC: 118.8 UG/DL

## 2024-06-07 LAB
ANNOTATION COMMENT IMP: NORMAL
RETINYL PALMITATE SERPL-MCNC: 0.04 MG/L
VIT A SERPL-MCNC: 0.8 MG/L

## 2024-06-08 LAB — VIT B1 PYROPHOSHATE BLD-SCNC: 241 NMOL/L

## 2024-06-17 ENCOUNTER — TELEPHONE (OUTPATIENT)
Dept: GASTROENTEROLOGY | Facility: CLINIC | Age: 54
End: 2024-06-17
Payer: COMMERCIAL

## 2024-06-17 NOTE — TELEPHONE ENCOUNTER
[facility] Call Center    Phone Message    May a detailed message be left on voicemail: Yes    Reason for Call: Other: Patient is currently scheduled on 7-9-24, as visit type New GI Urgent. This is outside the expected timeline for this referral. Patient has been added to the waitlist.      Action Taken: Message routed to:  Other: GI REFERRAL TRIAGE POOL     Travel Screening: Not Applicable

## 2024-06-24 DIAGNOSIS — K21.9 GASTROESOPHAGEAL REFLUX DISEASE WITHOUT ESOPHAGITIS: ICD-10-CM

## 2024-06-24 RX ORDER — OMEPRAZOLE 40 MG/1
CAPSULE, DELAYED RELEASE ORAL
Qty: 180 CAPSULE | Refills: 3 | Status: SHIPPED | OUTPATIENT
Start: 2024-06-24 | End: 2025-06-24

## 2024-07-01 DIAGNOSIS — E66.01 MORBID OBESITY (H): ICD-10-CM

## 2024-07-09 ENCOUNTER — OFFICE VISIT (OUTPATIENT)
Dept: GASTROENTEROLOGY | Facility: CLINIC | Age: 54
End: 2024-07-09
Attending: SURGERY
Payer: COMMERCIAL

## 2024-07-09 VITALS
SYSTOLIC BLOOD PRESSURE: 104 MMHG | BODY MASS INDEX: 34.92 KG/M2 | HEIGHT: 70 IN | WEIGHT: 243.9 LBS | DIASTOLIC BLOOD PRESSURE: 74 MMHG | HEART RATE: 79 BPM | OXYGEN SATURATION: 98 %

## 2024-07-09 DIAGNOSIS — R74.8 ABNORMAL LIVER ENZYMES: Primary | ICD-10-CM

## 2024-07-09 DIAGNOSIS — R10.13 EPIGASTRIC PAIN: ICD-10-CM

## 2024-07-09 PROCEDURE — 99205 OFFICE O/P NEW HI 60 MIN: CPT | Performed by: INTERNAL MEDICINE

## 2024-07-09 ASSESSMENT — PAIN SCALES - GENERAL: PAINLEVEL: NO PAIN (0)

## 2024-07-09 NOTE — NURSING NOTE
"Chief Complaint   Patient presents with    New Patient     New consult for epigastric abdominal pain s/p gastric bypass, occasional constipation.     She requests these members of her care team be copied on today's visit information:  PCP: Lilly De León    Referring Provider:  Remington Nance MD, MD  [address] [place] [id number]    Vitals:    07/09/24 1043   BP: 104/74   BP Location: Left arm   Patient Position: Sitting   Cuff Size: Adult Regular   Pulse: 79   SpO2: 98%   Weight: 110.6 kg (243 lb 14.4 oz)   Height: 1.778 m (5' 10\")     Body mass index is 35 kg/m .    Medications were reconciled.        Aparna Barr CMA     "

## 2024-07-09 NOTE — PROGRESS NOTES
ATTENDING ATTESTATION:    We evaluated this patient today for intermittent episodes of severe epigastric pain and abnormal liver tests.  These issues occur in the context of prior Teri-en-Y in 2019 with complications requiring surgical revision x 2, also prior cholecystectomy.  The pain is intermittent, few times per month, self-limited in nature lasting under 10 minutes and can be severe.  There are no predisposing factors but there is a prodromal phase before the episodes occur.  It is possible that these episodes could represent gas trapping, possible partial SBO secondary to prior surgical manipulation, possibly esophageal spasm.  She has had evaluation with laboratory studies, cross-sectional imaging, upper endoscopy.  I do not think that further diagnostic studies are needed presently.  She does seem to have underlying chronic constipation and there may be benefit in improving this.  I recommend initiation of MiraLAX 1-2 scoops per day.  Abnormal liver tests are also noted, since 2020.  It is possible this is a medication side effect versus potentially metabolic associated steatohepatitis.  I recommend some additional lab studies to assess for chronic liver conditions and fibroscan to look for liver fibrosis.  Recommend follow-up in about 3 months.  If bowel habits are more regular and episodes abdominal pain continue, then perhaps trial of neuromodulator such as amitriptyline at night would be beneficial as a prophylactic agent for pain episodes.      Patient was discussed and seen by Hong Fenton MD. The plan of care and pertinent data/imaging were also reviewed with GI Fellow as well. Time documented reflects my direct time and involvement in the care of this patient.  Agree with the joint assessment and plan as delineated above.    Please contact me with any further questions.    Hong Fenton MD  [facility]  Division of Gastroenterology  [phone]    A  total of 60 minutes was spent with reviewing the chart, discussing with the patient, documentation and coordination of care on the day of this encounter.    Hong Fenton MD            GI CLINIC VISIT    CC/REFERRING MD:  Remington Nance MD  REASON FOR CONSULTATION:   Remington Nance MD for   Chief Complaint   Patient presents with    New Patient     New consult for epigastric abdominal pain s/p gastric bypass, occasional constipation.       ASSESSMENT/PLAN:  53yo F w/ PMH of RYGB in 2019, SBO managed medically in 2020, and cholecystectomy 2012 for gallbladder dysfunction presenting for chronic abd pain and elevated LFTs.     #Epigastric abd pain   Present for the past couple years; did have a large stool burden on CT, possibly constipation is contributing; hx of SBO, it's possible she's having intermittent, mild obstructions  - Miralax 2x/day, titrate w/ goal of having 1 BM/day   - Consider amytriptyline in the future     #Abnormal LFTs  Present since 2021; US, CT, and MRCP nml; hep C neg in 2021; rare Tylenol use, no alcohol; possibly medication-related (statin) vs MAFLD   - Hep B surface ab, surface Ag, and core ab   - Fibroscan     RTC in 3mo  .    Patient was seen and d/w Dr. Eliceo Suarez MD  GI Fellow        HPI  53yo F w/ PMH of RYGB in 2019, SBO managed medically in 2020, and cholecystectomy 2012 for gallbladder dysfunction presenting for chronic abd pain and elevated LFTs.     Reports intermittent, cramping, epigastric abd pain (~2x/month). Not associated w/ eating or BMs. Lasts 5-7mins, resolves on its own. No melena or hematochezia.     ROS:    Negative except per HPI    PROBLEM LIST  Patient Active Problem List    Diagnosis Date Noted    DENICE (generalized anxiety disorder) 01/15/2021     Priority: Medium    History of MI (myocardial infarction) 01/15/2021     Priority: Medium    Upper back pain 01/15/2021     Priority: Medium    Pulmonary nodules 01/15/2021     Priority: Medium     Gastrojejunal ulcer with perforation (H) 11/04/2020     Priority: Medium    Heartburn 11/04/2020     Priority: Medium    Internal hernia 11/04/2020     Priority: Medium    Knee pain 11/04/2020     Priority: Medium    Plantar fasciitis 11/04/2020     Priority: Medium    NAVA (obstructive sleep apnea) 11/04/2020     Priority: Medium    Marginal ulcer 05/18/2020     Priority: Medium     Added automatically from request for surgery [id number]  Added automatically from request for surgery [id number]      Small bowel obstruction (H) 05/07/2020     Priority: Medium     Added automatically from request for surgery [id number]  Added automatically from request for surgery [id number]      Gastrointestinal problem 08/01/2019     Priority: Medium     Requires anesthesia for GI procedures      Pneumoperitoneum 05/21/2019     Priority: Medium     Added automatically from request for surgery [id number]  Added automatically from request for surgery [id number]      Morbid obesity (H) 01/29/2019     Priority: Medium    Moderate episode of recurrent major depressive disorder (H) 02/14/2018     Priority: Medium    Mixed hyperlipidemia 02/12/2018     Priority: Medium    Essential hypertension 01/07/2014     Priority: Medium    Coronary atherosclerosis of native coronary artery 04/24/2013     Priority: Medium    Stented coronary artery 04/01/2013     Priority: Medium     Patient is on Clopidogrel (Plavix) following stent placement.  Date of Intervention:  4/1/2013   Type of Stent:  CHIRAG  Patient is expected to continue taking Clopidogrel (Plavix) for one year (until 4/1/14) unless otherwise advised due to subsequent stent placement or continued bleeding.  Patient is on Clopidogrel (Plavix) following stent placement.  Date of Intervention:  4/1/2013   Type of Stent:  CHIRAG  Patient is expected to continue taking Clopidogrel (Plavix) for one year (until 4/1/14) unless otherwise advised due to subsequent stent placement or continued bleeding.  Patient is on  Clopidogrel (Plavix) following stent placement.  Date of Intervention:  4/1/2013   Type of Stent:  CHIRAG  Patient is expected to continue taking Clopidogrel (Plavix) for one year (until 4/1/14) unless otherwise advised due to subsequent stent placement or continued bleeding.  Patient is on Clopidogrel (Plavix) following stent placement.  Date of Intervention:  4/1/2013   Type of Stent:  CHIRAG  Patient is expected to continue taking Clopidogrel (Plavix) for one year (until 4/1/14) unless otherwise advised due to subsequent stent placement or continued bleeding.      Bipolar disorder (H) 09/17/2012     Priority: Medium    Female stress incontinence 09/13/2011     Priority: Medium    Obsessive-compulsive disorder 02/17/2011     Priority: Medium    Esophageal reflux 08/05/2009     Priority: Medium    Migraine 12/09/2005     Priority: Medium       PERTINENT PAST MEDICAL HISTORY:  Past Medical History:   Diagnosis Date    Anxiety     Bipolar disorder (H)     Bipolar disorder (H)     Cardiomyopathy (H)     Carpal tunnel syndrome     Coronary artery disease     Coronary artery disease 04/01/2013    2 stents    Coronary atherosclerosis     Depression     Depressive disorder     Heart attack (H) 2013    Heart attack (H)     Heart disease     Heartburn     History of anesthesia complications     Hyperlipidemia     Hypertension     Hypertension     Migraine     Morbid obesity with BMI of 40.0-44.9, adult (H)     Myocardial infarction (H) 20013    Obsessive compulsive disorder     OCD (obsessive compulsive disorder)     NAVA on CPAP     Plantar fasciitis     PONV (postoperative nausea and vomiting)     Sleep apnea     cpap    Stress incontinence        PREVIOUS SURGERIES:  Past Surgical History:   Procedure Laterality Date    ABDOMEN SURGERY      ANGIOPLASTY  2013    x 2    BIOPSY BREAST  2012    BREAST SURGERY      reduction    CARDIAC SURGERY      CHOLECYSTECTOMY      CHOLECYSTECTOMY  2010    CYSTOCELE REPAIR      GASTRECTOMY       "GASTRIC BYPASS      HERNIA REPAIR      HYSTEROSCOPY DIAGNOSTIC      MAMMOPLASTY REDUCTION  1998    OTHER SURGICAL HISTORY      little toe surgery    AZ LAP GASTRIC BYPASS/FRANSISCA-EN-Y N/A 1/29/2019    Procedure: LAPAROSCOPIC FRANSISCA-EN Y, GASTRIC BYPASS;  Surgeon: Ab Connor MD;  Location: [facility];  Service: General    AZ LAP,DIAGNOSTIC ABDOMEN N/A 5/21/2019    Procedure: LAPAROSCOPIC REPAIR OF PERFORATED MARGINAL ULCER;  Surgeon: Ab Connor MD;  Location: [facility] OR;  Service: General    AZ LAP,DIAGNOSTIC ABDOMEN N/A 5/7/2020    Procedure: LAPAROSCOPY AND CLOSURE OF INTERNAL HERNIA;  Surgeon: Ab Connor MD;  Location: [facility] OR;  Service: General    FRANSISCA EN Y BOWEL  01/23/2019    STENT      STOMACH SURGERY      perferated ulcer    TOE SURGERY      TUBAL LIGATION      TUBAL LIGATION  2004    VASCULAR SURGERY      WISDOM TOOTH EXTRACTION           ALLERGIES:     Allergies   Allergen Reactions    Bee Venom Anaphylaxis, Angioedema and Other (See Comments)     Comment: , Description:   Throat swells up      Codeine Other (See Comments)     Paranoia  Comment: \"makes me loopy\"  Description: \"makes me loopy\", moderate to severe reaction      Hydrocodone Other (See Comments)     Other reaction(s): Intolerance-Can't Take  Comment: paranoia, Description:   Comment: paranoia, Description:   Comment: parinoia      Seasonal Allergies        PERTINENT MEDICATIONS:    Current Outpatient Medications:     acetaminophen (TYLENOL) 500 MG tablet, Take 1,000 mg by mouth, Disp: , Rfl:     ALPRAZolam (XANAX) 0.25 MG tablet, , Disp: , Rfl:     ARIPiprazole (ABILIFY) 10 MG tablet, Take 1 tablet (10 mg) by mouth daily, Disp: 90 tablet, Rfl: 0    ARIPiprazole (ABILIFY) 5 MG tablet, , Disp: , Rfl:     aspirin (ASA) 81 MG EC tablet, Take 81 mg by mouth, Disp: , Rfl:     atorvastatin (LIPITOR) 40 MG tablet, Take 1 tablet (40 mg) by mouth daily, Disp: 90 tablet, Rfl: 3    buPROPion (WELLBUTRIN XL) 300 " MG 24 hr tablet, Take 1 tablet (300 mg) by mouth every morning, Disp: 90 tablet, Rfl: 1    calcium citrate-vitamin D (CITRACAL) 315-250 MG-UNIT TABS per tablet, Take by mouth 2 times daily, Disp: , Rfl:     cholecalciferol 50 MCG (2000 UT) tablet, Take 2,000 Units by mouth, Disp: , Rfl:     DULoxetine (CYMBALTA) 60 MG capsule, Take 120 mg by mouth, Disp: , Rfl:     EPINEPHrine (ANY BX GENERIC EQUIV) 0.3 MG/0.3ML injection 2-pack, Inject the contents of one device into the muscle as needed for anaphylaxis; may repeat one time in 5-15 minutes if response to initial dose is inadequate if needed for tongue swelling, SOB. Go to ER immediately after use for possible rebound, Disp: 2 each, Rfl: 1    lamoTRIgine (LAMICTAL) 200 MG tablet, Take 200 mg by mouth, Disp: , Rfl:     levonorgestrel (MIRENA) 20 MCG/24HR IUD, 1 each by Intrauterine route, Disp: , Rfl:     nitroGLYcerin (NITROSTAT) 0.4 MG sublingual tablet, Place 0.4 mg under the tongue, Disp: , Rfl:     Omega-3 Fatty Acids (FISH OIL PEARLS) 183.33 MG CAPS, Take 1 capsule by mouth, Disp: , Rfl:     omeprazole (PRILOSEC) 40 MG DR capsule, Take 1 capsule (40 mg total) by mouth 2 (two) times a day., Disp: 180 capsule, Rfl: 3    Respiratory Therapy Supplies (CARETOUCH 2 CPAP HOSE ) MISC, CPAP for home use at pressure of 10cmw.  Heated humidifier, Heated humidifier x1 q5 yr , Humidifier chamber x1 q6 mo, Full face mask x1 q3 mo, Full face cushion q2 mo, Heated tubing x1 q3 mo, Headgear x1 q6 mo, Chinstrap x1 q6 mo, Filters: Disposable x2 q mo, Non-disposable filters x1 q6 mo, Length of Need: 99 months, Frequency of use: Daily., Disp: , Rfl:     Respiratory Therapy Supplies (CARETOUCH UNIVERSL CPAP FILTER) MISC, CPAP for home use at pressure of 10cmw.  Heated humidifier, Heated humidifier x1 q5 yr , Humidifier chamber x1 q6 mo, Full face mask x1 q3 mo, Full face cushion q2 mo, Heated tubing x1 q3 mo, Headgear x1 q6 mo, Chinstrap x1 q6 mo, Filters: Disposable x2 q mo,  Non-disposable filters x1 q6 mo, Length of Need: 99 months, Frequency of use: Daily., Disp: , Rfl:     rosuvastatin (CRESTOR) 40 MG tablet, , Disp: , Rfl:     Semaglutide-Weight Management (WEGOVY) 1 MG/0.5ML pen, Inject 1 mg Subcutaneous once a week, Disp: 2 mL, Rfl: 0    Sharps Container (SHARPS-A-GATOR LOCKING BRACKET) MISC, CPAP for home use at pressure of 9  Heated humidifier x1, Humidifier chamber x1, Heated tubing x1, Full face mask with cushion x1, Headgear x1, Filters: Disposable x1 pack, Reusable x1pk, Length of Need: 99 months, Frequency of use: Daily, Disp: , Rfl:     traZODone (DESYREL) 50 MG tablet, Take 50 mg by mouth, Disp: , Rfl:     vitamin B complex with vitamin C (VITAMIN  B COMPLEX) tablet, Take 1 tablet by mouth, Disp: , Rfl:     cyclobenzaprine (FLEXERIL) 5 MG tablet, Take 1-2 tablets (5-10 mg) by mouth 3 times daily as needed for muscle spasms (Patient not taking: Reported on 7/28/2023), Disp: 20 tablet, Rfl: 3    SOCIAL HISTORY:  Social History     Socioeconomic History    Marital status:      Spouse name: Not on file    Number of children: Not on file    Years of education: Not on file    Highest education level: Not on file   Occupational History    Not on file   Tobacco Use    Smoking status: Never    Smokeless tobacco: Never   Vaping Use    Vaping status: Never Used   Substance and Sexual Activity    Alcohol use: No     Comment: a bottle of wine a day     Drug use: No    Sexual activity: Yes     Partners: Male     Birth control/protection: I.U.D., Female Surgical   Other Topics Concern    Parent/sibling w/ CABG, MI or angioplasty before 65F 55M? 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[employer] [place]      Lives at home with her      Social Determinants of Health     Financial Resource Strain: High Risk (12/27/2021)    Received [facility], [facility]    Financial  "Resource Strain     Difficulty of Paying Living Expenses: Not on file     Difficulty of Paying Living Expenses: Not on file   Food Insecurity: No Food Insecurity (3/17/2021)    Hunger Vital Sign     Worried About Running Out of Food in the Last Year: Never true     Ran Out of Food in the Last Year: Never true   Transportation Needs: No Transportation Needs (3/17/2021)    PRAPARE - Transportation     Lack of Transportation (Medical): No     Lack of Transportation (Non-Medical): No   Physical Activity: Not on file   Stress: Not on file   Social Connections: Unknown (1/16/2023)    Received from [employer] [employer]    Social Connections     Frequency of Communication with Friends and Family: Not on file   Interpersonal Safety: Not on file   Housing Stability: Not on file       FAMILY HISTORY:  Family History   Problem Relation Age of Onset    Cancer Mother     Depression Mother     Mental Illness Mother     Substance Abuse Paternal Grandmother     Substance Abuse Brother     Substance Abuse Sister     Substance Abuse Daughter     Depression Sister     Hypotension Mother     Anemia Mother     No Known Problems Sister     Obesity Brother     Sleep Apnea Brother     Hyperlipidemia Brother     Hypertension Brother     Depression Sister     Obesity Sister        Past/family/social history reviewed and no changes    PHYSICAL EXAMINATION:  Constitutional: aaox3, cooperative, pleasant, not dyspneic/diaphoretic, no acute distress  Vitals reviewed: /74 (BP Location: Left arm, Patient Position: Sitting, Cuff Size: Adult Regular)   Pulse 79   Ht 1.778 m (5' 10\")   Wt 110.6 kg (243 lb 14.4 oz)   SpO2 98%   BMI 35.00 kg/m    Wt:   Wt Readings from Last 2 Encounters:   07/09/24 110.6 kg (243 lb 14.4 oz)   05/03/24 113.4 kg (250 lb)      Eyes: Sclera anicteric/injected  Ears/nose/mouth/throat: hearing intact  Respiratory: Unlabored breathing  Abd: " Nondistended, nontender, no peritoneal signs  Skin: warm, perfused, no jaundice  Psych: Normal affect  MSK: Normal gait         PERTINENT STUDIES:    Lab on 06/05/2024   Component Date Value Ref Range Status    Hemoglobin A1C 06/05/2024 5.4  0.0 - 5.6 % Final    Sodium 06/05/2024 144  135 - 145 mmol/L Final    Potassium 06/05/2024 4.7  3.4 - 5.3 mmol/L Final    Carbon Dioxide (CO2) 06/05/2024 30 (H)  22 - 29 mmol/L Final    Anion Gap 06/05/2024 10  7 - 15 mmol/L Final    Urea Nitrogen 06/05/2024 12.2  6.0 - 20.0 mg/dL Final    Creatinine 06/05/2024 1.14 (H)  0.51 - 0.95 mg/dL Final    GFR Estimate 06/05/2024 57 (L)  >60 mL/min/1.73m2 Final    Calcium 06/05/2024 9.9  8.6 - 10.0 mg/dL Final    Chloride 06/05/2024 104  98 - 107 mmol/L Final    Glucose 06/05/2024 72  70 - 99 mg/dL Final    Alkaline Phosphatase 06/05/2024 92  40 - 150 U/L Final    AST 06/05/2024 75 (H)  0 - 45 U/L Final    ALT 06/05/2024 125 (H)  0 - 50 U/L Final    Protein Total 06/05/2024 6.8  6.4 - 8.3 g/dL Final    Albumin 06/05/2024 4.4  3.5 - 5.2 g/dL Final    Bilirubin Total 06/05/2024 0.4  <=1.2 mg/dL Final    Vitamin A 06/05/2024 0.80  0.30 - 1.20 mg/L Final    Retinol Palmitate 06/05/2024 0.04  0.00 - 0.10 mg/L Final    Vitamin A Interp 06/05/2024 Normal   Final    Vitamin D, Total (25-Hydroxy) 06/05/2024 65 (H)  20 - 50 ng/mL Final    Parathyroid Hormone Intact 06/05/2024 19  15 - 65 pg/mL Final    Ferritin 06/05/2024 137  11 - 328 ng/mL Final    Folic Acid 06/05/2024 >40.0 (H)  4.6 - 34.8 ng/mL Final    WBC Count 06/05/2024 4.2  4.0 - 11.0 10e3/uL Final    RBC Count 06/05/2024 4.28  3.80 - 5.20 10e6/uL Final    Hemoglobin 06/05/2024 13.4  11.7 - 15.7 g/dL Final    Hematocrit 06/05/2024 40.3  35.0 - 47.0 % Final    MCV 06/05/2024 94  78 - 100 fL Final    MCH 06/05/2024 31.3  26.5 - 33.0 pg Final    MCHC 06/05/2024 33.3  31.5 - 36.5 g/dL Final    RDW 06/05/2024 12.2  10.0 - 15.0 % Final    Platelet Count 06/05/2024 205  150 - 450 10e3/uL Final     Vitamin B1 Whole Blood Level 06/05/2024 241 (H)  70 - 180 nmol/L Final    Zinc, Serum/Plasma 06/05/2024 118.8  60.0 - 120.0 ug/dL Final

## 2024-07-09 NOTE — PATIENT INSTRUCTIONS
It was a pleasure meeting with you today and discussing your healthcare plan. Below is a summary of what we covered:    It was nice to talk with you today.  As we discussed, it seems that your prior GI evaluations were quite complete so I do not think we need a lot of additional testing at this time for the abdominal pain.  I do think that taking MiraLAX on a regular basis may be helpful for you.  You can start with MiraLAX 17 g daily taken with water or another liquid such as Gatorade at a convenient time for you.  Most people take this in the morning and then after a week you can increase to a second dose in the afternoon if needed for the goal of having a bowel movement on approximately a daily basis.  We can schedule a follow-up appointment for you in the clinic.  If you are having regular bowel movements and the abdominal pain episodes continue, you can discuss a trial of a medication called amitriptyline at the follow-up.  This can help with regulation of the gut brain axis and help with abdominal pain.  For the abnormal liver test, we will check some additional blood work test.  Please get these done at your convenience at any [facility] lab.  I will also order a scan called a FibroScan which is another liver test to look for scarring of the liver.  Please let me know if you have any questions or concerns.      Please see below for any additional questions and scheduling guidelines.    Sign up for [facility]: [facility] patient portal serves as a secure platform for accessing your medical records from the [facility]. Additionally, [facility] facilitates easy, timely, and secure messaging with your care team. If you have not signed up, you may do so by using the provided code or calling [phone].    Coordinating your care after your visit:  There are multiple options for scheduling your follow-up care based on your provider's recommendation.    How do I schedule a follow-up clinic appointment:   After  your appointment, you may receive scheduling assistance with the Clinic Coordinators by having a seat in the waiting room and a Clinic Coordinator will call you up to schedule.  Virtual visits or after you leave the clinic:  Your provider has placed a follow-up order in the [employer] portal for scheduling your return appointment. A member of the scheduling team will contact you to schedule.  [facility] Scheduling: Timely scheduling through [employer] is advised to ensure appointment availability.   Call to schedule: You may schedule your follow-up appointment(s) by calling [phone], option 1.    How do I schedule my endoscopy or colonoscopy procedure:  If a procedure, such as a colonoscopy or upper endoscopy was ordered by your provider, the scheduling team will contact you to schedule this procedure. Or you may choose to call to schedule at   [phone], option 2.  Please allow 20-30 minutes when scheduling a procedure.    How do I get my blood work done? To get your blood work done, you need to schedule a lab appointment at an [facility]. There are multiple ways to schedule:   At the clinic: The Clinic Coordinator you meet after your visit can help you schedule a lab appointment.   [facility] scheduling: [facility] offers online lab scheduling at all [facility] laboratory locations.   Call to schedule: You can call [phone] to schedule your lab appointment.    How do I schedule my imaging study: To schedule imaging studies, such as CT scans, ultrasounds, MRIs, or X-rays, contact Imaging Services at [phone].    How do I schedule a referral to another doctor: If your provider recommended a referral to another specialist(s), the referral order was placed by your provider. You will receive a phone call to schedule this referral, or you may choose to call the number attached to the referral to self-schedule.    For Post-Visit Question(s):  For any inquiries following today's visit:  Please  utilize [employer] messaging and allow 48 hours for reply or contact the Call Center during normal business hours at [phone], option 3.  For Emergent After-hours questions, contact the On-Call GI Fellow through the [facility]  at [phone].  In addition, you may contact your Nurse directly using the provided contact information.    Test Results:  Test results will be accessible via [employer] in compliance with the 21st Century Cures Act. This means that your results will be available to you at the same time as your provider. Often you may see your results before your provider does. Results are reviewed by staff within two weeks with communication follow-up. Results may be released in the patient portal prior to your care team review.    Prescription Refill(s):  Medication prescribed by your provider will be addressed during your visit. For future refills, please coordinate with your pharmacy. If you have not had a recent clinic visit or routine labs, for your safety, your provider may not be able to refill your prescription.

## 2024-07-27 ENCOUNTER — ANCILLARY PROCEDURE (OUTPATIENT)
Dept: ULTRASOUND IMAGING | Facility: CLINIC | Age: 54
End: 2024-07-27
Attending: INTERNAL MEDICINE
Payer: COMMERCIAL

## 2024-07-27 DIAGNOSIS — R74.8 ABNORMAL LIVER ENZYMES: ICD-10-CM

## 2024-07-27 PROCEDURE — 76981 USE PARENCHYMA: CPT | Mod: GC | Performed by: RADIOLOGY

## 2024-07-29 ENCOUNTER — MYC MEDICAL ADVICE (OUTPATIENT)
Dept: SURGERY | Facility: CLINIC | Age: 54
End: 2024-07-29
Payer: COMMERCIAL

## 2024-07-29 ENCOUNTER — TRANSFERRED RECORDS (OUTPATIENT)
Dept: HEALTH INFORMATION MANAGEMENT | Facility: CLINIC | Age: 54
End: 2024-07-29
Payer: COMMERCIAL

## 2024-07-29 VITALS — WEIGHT: 240 LBS | BODY MASS INDEX: 34.44 KG/M2

## 2024-07-29 DIAGNOSIS — E66.01 MORBID OBESITY (H): ICD-10-CM

## 2024-07-29 DIAGNOSIS — M54.9 UPPER BACK PAIN: ICD-10-CM

## 2024-07-29 RX ORDER — CYCLOBENZAPRINE HCL 5 MG
5-10 TABLET ORAL 3 TIMES DAILY PRN
Qty: 20 TABLET | Refills: 3 | OUTPATIENT
Start: 2024-07-29

## 2024-11-01 ENCOUNTER — VIRTUAL VISIT (OUTPATIENT)
Dept: SURGERY | Facility: CLINIC | Age: 54
End: 2024-11-01
Payer: COMMERCIAL

## 2024-11-01 VITALS — BODY MASS INDEX: 32.93 KG/M2 | WEIGHT: 230 LBS | HEIGHT: 70 IN

## 2024-11-01 DIAGNOSIS — K91.2 POSTOPERATIVE MALABSORPTION: Primary | ICD-10-CM

## 2024-11-01 DIAGNOSIS — E66.9 OBESITY (BMI 30-39.9): ICD-10-CM

## 2024-11-01 PROCEDURE — 99213 OFFICE O/P EST LOW 20 MIN: CPT | Mod: 95 | Performed by: FAMILY MEDICINE

## 2024-11-01 ASSESSMENT — PAIN SCALES - GENERAL: PAINLEVEL_OUTOF10: NO PAIN (0)

## 2024-11-01 NOTE — NURSING NOTE
Current patient location: [address] [place] [id number]    Is the patient currently in the state of [place]? NO    Visit mode:VIDEO    If the visit is dropped, the patient can be reconnected by: VIDEO VISIT: Text to cell phone:   Telephone Information:   Mobile [phone]       Will anyone else be joining the visit? NO  (If patient encounters technical issues they should call [phone])    Are changes needed to the allergy or medication list? No, Pt stated no changes to allergies, and Pt stated no med changes    Are refills needed on medications prescribed by this physician? Discuss with provider    Rooming Documentation:  Questionnaire(s) completed    Reason for visit: RECHECK (Donte)    Leonor Clark VVF    Pt stated in [place].

## 2024-11-01 NOTE — PROGRESS NOTES
Virtual Visit Details    Type of service:  Video Visit     Originating Location (pt. Location): Home    Distant Location (provider location):  On-site  Platform used for Video Visit: [facility]    Bariatric Follow Up Visit with a History of Previous Bariatric Surgery     Date of visit: 11/1/2024  Physician: Sherly Zamora MD, MD  Primary Care Provider:  Lilly De León   54 year old  female    Date of Surgery: 1/29/19  Initial Weight: 309#  Initial BMI: 44.34  Today's Weight:   Wt Readings from Last 1 Encounters:   11/01/24 104.3 kg (230 lb)     Body mass index is 33 kg/m .      Assessment and Plan     Assessment: Kristine is a 54 year old year old female who is 6 yrs s/p  Teri en Y Gastric Bypass with Dr. Zhane Scott feels as if she has achieved the goals she hoped to accomplish through bariatric surgery and weight loss.    Encounter Diagnoses   Name Primary?    Postoperative malabsorption Yes    Obesity (BMI 30-39.9)          Current Outpatient Medications:     acetaminophen (TYLENOL) 500 MG tablet, Take 1,000 mg by mouth, Disp: , Rfl:     ALPRAZolam (XANAX) 0.25 MG tablet, , Disp: , Rfl:     ARIPiprazole (ABILIFY) 10 MG tablet, Take 1 tablet (10 mg) by mouth daily, Disp: 90 tablet, Rfl: 0    ARIPiprazole (ABILIFY) 5 MG tablet, , Disp: , Rfl:     aspirin (ASA) 81 MG EC tablet, Take 81 mg by mouth, Disp: , Rfl:     atorvastatin (LIPITOR) 40 MG tablet, Take 1 tablet (40 mg) by mouth daily, Disp: 90 tablet, Rfl: 3    buPROPion (WELLBUTRIN XL) 300 MG 24 hr tablet, Take 1 tablet (300 mg) by mouth every morning, Disp: 90 tablet, Rfl: 1    calcium citrate-vitamin D (CITRACAL) 315-250 MG-UNIT TABS per tablet, Take by mouth 2 times daily, Disp: , Rfl:     cholecalciferol 50 MCG (2000 UT) tablet, Take 2,000 Units by mouth, Disp: , Rfl:     DULoxetine (CYMBALTA) 60 MG capsule, Take 120 mg by mouth, Disp: , Rfl:     EPINEPHrine (ANY BX GENERIC EQUIV) 0.3 MG/0.3ML injection 2-pack, Inject the contents of  one device into the muscle as needed for anaphylaxis; may repeat one time in 5-15 minutes if response to initial dose is inadequate if needed for tongue swelling, SOB. Go to ER immediately after use for possible rebound, Disp: 2 each, Rfl: 1    lamoTRIgine (LAMICTAL) 200 MG tablet, Take 200 mg by mouth, Disp: , Rfl:     levonorgestrel (MIRENA) 20 MCG/24HR IUD, 1 each by Intrauterine route, Disp: , Rfl:     nitroGLYcerin (NITROSTAT) 0.4 MG sublingual tablet, Place 0.4 mg under the tongue, Disp: , Rfl:     Omega-3 Fatty Acids (FISH OIL PEARLS) 183.33 MG CAPS, Take 1 capsule by mouth, Disp: , Rfl:     omeprazole (PRILOSEC) 40 MG DR capsule, Take 1 capsule (40 mg total) by mouth 2 (two) times a day., Disp: 180 capsule, Rfl: 3    Respiratory Therapy Supplies (CARETOUCH 2 CPAP HOSE ) MISC, CPAP for home use at pressure of 10cmw.  Heated humidifier, Heated humidifier x1 q5 yr , Humidifier chamber x1 q6 mo, Full face mask x1 q3 mo, Full face cushion q2 mo, Heated tubing x1 q3 mo, Headgear x1 q6 mo, Chinstrap x1 q6 mo, Filters: Disposable x2 q mo, Non-disposable filters x1 q6 mo, Length of Need: 99 months, Frequency of use: Daily., Disp: , Rfl:     Respiratory Therapy Supplies (CARETOUCH UNIVERSL CPAP FILTER) MISC, CPAP for home use at pressure of 10cmw.  Heated humidifier, Heated humidifier x1 q5 yr , Humidifier chamber x1 q6 mo, Full face mask x1 q3 mo, Full face cushion q2 mo, Heated tubing x1 q3 mo, Headgear x1 q6 mo, Chinstrap x1 q6 mo, Filters: Disposable x2 q mo, Non-disposable filters x1 q6 mo, Length of Need: 99 months, Frequency of use: Daily., Disp: , Rfl:     rosuvastatin (CRESTOR) 40 MG tablet, , Disp: , Rfl:     Semaglutide-Weight Management (WEGOVY) 2.4 MG/0.75ML pen, Inject 2.4 mg subcutaneously once a week., Disp: 9 mL, Rfl: 3    Sharps Container (SHARPS-A-GATOR LOCKING BRACKET) MISC, CPAP for home use at pressure of 9  Heated humidifier x1, Humidifier chamber x1, Heated tubing x1, Full face mask with  "cushion x1, Headgear x1, Filters: Disposable x1 pack, Reusable x1pk, Length of Need: 99 months, Frequency of use: Daily, Disp: , Rfl:     traZODone (DESYREL) 50 MG tablet, Take 50 mg by mouth, Disp: , Rfl:     vitamin B complex with vitamin C (VITAMIN  B COMPLEX) tablet, Take 1 tablet by mouth, Disp: , Rfl:     cyclobenzaprine (FLEXERIL) 5 MG tablet, Take 1-2 tablets (5-10 mg) by mouth 3 times daily as needed for muscle spasms (Patient not taking: Reported on 7/28/2023), Disp: 20 tablet, Rfl: 3     Plan: Restart B-12, discussed water soluble nature of B-12 and no upper limit and consequences of a low B-12, and 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 Continue MVI 2/d and D3 5,000U (125mcg) Semaglutide 2.4mg weekly injections.     RD prn then annual with labs    Bariatric Surgery Review     Interim History/LifeChanges: Maintaining a 79# weight loss. Started doing yoga 2-3X/wk. Sleeping poorly. Walking. 20 minutes 3X/wk. [place]. Now nausea. Constipation managed by miralax. Applejuice with miralax. Occasional soda regular. Was told to stop her B-12 due to a high level. Snarky tea (low/no calories).     Patient Concerns: doing well.  Appetite (1-10): managed well  GERD: on PPI    Medication changes: no    Vitamin Intake:   B-12   stopped   MVI  2 with iron   Vitamin D  5,000   Calcium   supplement     Other                LABS: \"Reviewed  excellent  Nausea rare  Vomiting no  Constipation managed  Diarrhea no  Rashes no  Hair Loss no  Calf tenderness no  Breathing difficulty no  Reactive Hypoglycemia avoids  Light Headedness occ   Moods up and down. Now on an upswing    12 point ROS as above and otherwise negative      Habits:  Alcohol: no  Tobacco: no  Caffeine tea and coffee  NSAIDS ASA PPI  Exercise Routine: walking and yoga  3 meals/day yes B: 2 cups of coffee and protein shake L: 1/4 c rice and stir pierce veggies with chicken D: grapes and cheese and nuts  Protein first " yes  60grams/day  Water Separate from meals yes  Calorie Containing Beverages occ to rare  Restaurant eating/wk 0-1  Sleeping not well-interrupted ruminating thoughts, couldn't fall asleep last night-has white noise, writing it down, trazodone. 5-10 hours  Stress very high due to stress, anxiety,-working intentionally to improve/manage  CPAP: NA  Contraception: PM  DEXA:NA    Social History     Social History     Socioeconomic History    Marital status:      Spouse name: Not on file    Number of children: Not on file    Years of education: Not on file    Highest education level: Not on file   Occupational History    Not on file   Tobacco Use    Smoking status: Never    Smokeless tobacco: Never   Vaping Use    Vaping status: Never Used   Substance and Sexual Activity    Alcohol use: No     Comment: a bottle of wine a day     Drug use: No    Sexual activity: Yes     Partners: Male     Birth control/protection: I.U.D., Female Surgical   Other Topics Concern    Parent/sibling w/ CABG, MI or angioplasty before 65F 55M? 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 [place]      Lives at home with her      Social Drivers of Health     Financial Resource Strain: High Risk (12/27/2021)    [facility], [facility]    Financial Resource Strain     Difficulty of Paying Living Expenses: Not on file     Difficulty of Paying Living Expenses: Not on file   Food Insecurity: No Food Insecurity (3/17/2021)    Hunger Vital Sign     Worried About Running Out of Food in the Last Year: Never true     Ran Out of Food in the Last Year: Never true   Transportation Needs: No Transportation Needs (3/17/2021)    PRAPARE - Transportation     Lack of Transportation (Medical): No     Lack of Transportation (Non-Medical): No   Physical Activity: Not on file   Stress: Not on file   Social Connections: Unknown  (1/16/2023)    [facility], [facility]    Social Connections     Frequency of Communication with Friends and Family: Not on file   Interpersonal Safety: Not on file   Housing Stability: Not on file       Past Medical History     Past Medical History:   Diagnosis Date    Anxiety     Bipolar disorder (H)     Bipolar disorder (H)     Cardiomyopathy (H)     Carpal tunnel syndrome     Coronary artery disease     Coronary artery disease 04/01/2013    2 stents    Coronary atherosclerosis     Depression     Depressive disorder     Heart attack (H) 2013    Heart attack (H)     Heart disease     Heartburn     History of anesthesia complications     Hyperlipidemia     Hypertension     Hypertension     Migraine     Morbid obesity with BMI of 40.0-44.9, adult (H)     Myocardial infarction (H) 20013    Obsessive compulsive disorder     OCD (obsessive compulsive disorder)     NAVA on CPAP     Plantar fasciitis     PONV (postoperative nausea and vomiting)     Sleep apnea     cpap    Stress incontinence      Problem List     Patient Active Problem List   Diagnosis    Bipolar disorder (H)    Coronary atherosclerosis of native coronary artery    Esophageal reflux    Essential hypertension    Female stress incontinence    Gastrointestinal problem    Gastrojejunal ulcer with perforation (H)    Heartburn    Internal hernia    Knee pain    Marginal ulcer    Migraine    Mixed hyperlipidemia    Moderate episode of recurrent major depressive disorder (H)    Morbid obesity (H)    Obsessive-compulsive disorder    Plantar fasciitis    Pneumoperitoneum    NAVA (obstructive sleep apnea)    Small bowel obstruction (H)    Stented coronary artery    DENICE (generalized anxiety disorder)    History of MI (myocardial infarction)    Upper back pain    Pulmonary nodules     Medications       Current Outpatient Medications:     acetaminophen (TYLENOL) 500 MG tablet, Take 1,000 mg  by mouth, Disp: , Rfl:     ALPRAZolam (XANAX) 0.25 MG tablet, , Disp: , Rfl:     ARIPiprazole (ABILIFY) 10 MG tablet, Take 1 tablet (10 mg) by mouth daily, Disp: 90 tablet, Rfl: 0    ARIPiprazole (ABILIFY) 5 MG tablet, , Disp: , Rfl:     aspirin (ASA) 81 MG EC tablet, Take 81 mg by mouth, Disp: , Rfl:     atorvastatin (LIPITOR) 40 MG tablet, Take 1 tablet (40 mg) by mouth daily, Disp: 90 tablet, Rfl: 3    buPROPion (WELLBUTRIN XL) 300 MG 24 hr tablet, Take 1 tablet (300 mg) by mouth every morning, Disp: 90 tablet, Rfl: 1    calcium citrate-vitamin D (CITRACAL) 315-250 MG-UNIT TABS per tablet, Take by mouth 2 times daily, Disp: , Rfl:     cholecalciferol 50 MCG (2000 UT) tablet, Take 2,000 Units by mouth, Disp: , Rfl:     DULoxetine (CYMBALTA) 60 MG capsule, Take 120 mg by mouth, Disp: , Rfl:     EPINEPHrine (ANY BX GENERIC EQUIV) 0.3 MG/0.3ML injection 2-pack, Inject the contents of one device into the muscle as needed for anaphylaxis; may repeat one time in 5-15 minutes if response to initial dose is inadequate if needed for tongue swelling, SOB. Go to ER immediately after use for possible rebound, Disp: 2 each, Rfl: 1    lamoTRIgine (LAMICTAL) 200 MG tablet, Take 200 mg by mouth, Disp: , Rfl:     levonorgestrel (MIRENA) 20 MCG/24HR IUD, 1 each by Intrauterine route, Disp: , Rfl:     nitroGLYcerin (NITROSTAT) 0.4 MG sublingual tablet, Place 0.4 mg under the tongue, Disp: , Rfl:     Omega-3 Fatty Acids (FISH OIL PEARLS) 183.33 MG CAPS, Take 1 capsule by mouth, Disp: , Rfl:     omeprazole (PRILOSEC) 40 MG DR capsule, Take 1 capsule (40 mg total) by mouth 2 (two) times a day., Disp: 180 capsule, Rfl: 3    Respiratory Therapy Supplies (CARETOUCH 2 CPAP HOSE ) MISC, CPAP for home use at pressure of 10cmw.  Heated humidifier, Heated humidifier x1 q5 yr , Humidifier chamber x1 q6 mo, Full face mask x1 q3 mo, Full face cushion q2 mo, Heated tubing x1 q3 mo, Headgear x1 q6 mo, Chinstrap x1 q6 mo, Filters: Disposable  x2 q mo, Non-disposable filters x1 q6 mo, Length of Need: 99 months, Frequency of use: Daily., Disp: , Rfl:     Respiratory Therapy Supplies ([facility] CPAP FILTER) MISC, CPAP for home use at pressure of 10cmw.  Heated humidifier, Heated humidifier x1 q5 yr , Humidifier chamber x1 q6 mo, Full face mask x1 q3 mo, Full face cushion q2 mo, Heated tubing x1 q3 mo, Headgear x1 q6 mo, Chinstrap x1 q6 mo, Filters: Disposable x2 q mo, Non-disposable filters x1 q6 mo, Length of Need: 99 months, Frequency of use: Daily., Disp: , Rfl:     rosuvastatin (CRESTOR) 40 MG tablet, , Disp: , Rfl:     Semaglutide-Weight Management (WEGOVY) 2.4 MG/0.75ML pen, Inject 2.4 mg subcutaneously once a week., Disp: 9 mL, Rfl: 3    Sharps Container (SHARPS-A-GATOR LOCKING BRACKET) MISC, CPAP for home use at pressure of 9  Heated humidifier x1, Humidifier chamber x1, Heated tubing x1, Full face mask with cushion x1, Headgear x1, Filters: Disposable x1 pack, Reusable x1pk, Length of Need: 99 months, Frequency of use: Daily, Disp: , Rfl:     traZODone (DESYREL) 50 MG tablet, Take 50 mg by mouth, Disp: , Rfl:     vitamin B complex with vitamin C (VITAMIN  B COMPLEX) tablet, Take 1 tablet by mouth, Disp: , Rfl:     cyclobenzaprine (FLEXERIL) 5 MG tablet, Take 1-2 tablets (5-10 mg) by mouth 3 times daily as needed for muscle spasms (Patient not taking: Reported on 7/28/2023), Disp: 20 tablet, Rfl: 3   Surgical History     Past Surgical History  She has a past surgical history that includes Teri En Y bowel (01/23/2019); gastric bypass; hernia repair; Stomach surgery; Breast surgery; tubal ligation; Toe Surgery; Cholecystectomy; Stent; [place] Tooth Extraction; other surgical history; tubal ligation (2004); Cholecystectomy (2010); Mammoplasty reduction (1998); Biopsy breast (2012); Angioplasty (2013); Cystocele Repair; Cardiac surgery; Hysteroscopy Diagnostic; Pr Lap Gastric Bypass/Teri-En-Y (N/A, 1/29/2019); Pr Lap,Diagnostic Abdomen (N/A,  "5/21/2019); Pr Lap,Diagnostic Abdomen (N/A, 5/7/2020); Gastrectomy; vascular surgery; and Abdomen surgery.    Objective-Exam     Constitutional:  Ht 1.778 m (5' 10\")   Wt 104.3 kg (230 lb)   BMI 33.00 kg/m      General:  Pleasant and in no acute distress   Eyes:  engaging  Neck:  Supple, No LAD, No thyromegaly, No carotid bruits appreciated  Respiratory: Normal respiratory effort, no cough, wheezes or crackles  Skin: color pink Hair pulled back  Neurological: No tremor,  Psychiatric: alert and oriented X3, mood and affect normal    Counseling     We reviewed the important post op bariatric recommendations:  -eating 3 meals daily  -eating protein first, getting >60gm protein daily  -eating slowly, chewing food well  -avoiding/limiting calorie containing beverages  -drinking water 15-30 minutes before or after meals  -choosing wheat, not white with breads, crackers, pastas, noemy, bagels, tortillas, rice  -limiting restaurant or cafeteria eating to twice a week or less    We discussed the importance of restorative sleep and stress management in maintaining a healthy weight.  We discussed the National Weight Control Registry healthy weight maintenance strategies and ways to optimize metabolism.  We discussed the importance of physical activity including cardiovascular and strength training in maintaining a healthier weight.    We discussed the importance of life-long vitamin supplementation and life-long  follow-up.    Kristine was reminded that, to avoid marginal ulcers she should avoid tobacco at all, alcohol in excess, caffeine in excess, and NSAIDS (unless indicated for cardioprotection or othewise and opposed by a PPI).    Sherly Zamora MD, MD, FAAFP  [facility].  11/1/2024  8:41 AM      No images are attached to the encounter.  Total time spent on the date of this encounter doing: chart review, review of test results, patient visit, physical exam, education, counseling, developing " plan of care, and documenting = 24 minutes.

## 2024-11-01 NOTE — LETTER
11/1/2024      Kristine Scott  [address]  [facility] [address]      Dear Colleague,    Thank you for referring your patient, Kristine Scott, to the [facility] [place]. Please see a copy of my visit note below.    Virtual Visit Details    Type of service:  Video Visit     Originating Location (pt. Location): Home    Distant Location (provider location):  On-site  Platform used for Video Visit: [facility]    Bariatric Follow Up Visit with a History of Previous Bariatric Surgery     Date of visit: 11/1/2024  Physician: Sherly Zamora MD, MD  Primary Care Provider:  Lilly De León   54 year old  female    Date of Surgery: 1/29/19  Initial Weight: 309#  Initial BMI: 44.34  Today's Weight:   Wt Readings from Last 1 Encounters:   11/01/24 104.3 kg (230 lb)     Body mass index is 33 kg/m .      Assessment and Plan     Assessment: Kristine is a 54 year old year old female who is 6 yrs s/p  Teri en Y Gastric Bypass with Dr. Phelan  Kristine VANESSA Tyler feels as if she has achieved the goals she hoped to accomplish through bariatric surgery and weight loss.    Encounter Diagnoses   Name Primary?     Postoperative malabsorption Yes     Obesity (BMI 30-39.9)          Current Outpatient Medications:      acetaminophen (TYLENOL) 500 MG tablet, Take 1,000 mg by mouth, Disp: , Rfl:      ALPRAZolam (XANAX) 0.25 MG tablet, , Disp: , Rfl:      ARIPiprazole (ABILIFY) 10 MG tablet, Take 1 tablet (10 mg) by mouth daily, Disp: 90 tablet, Rfl: 0     ARIPiprazole (ABILIFY) 5 MG tablet, , Disp: , Rfl:      aspirin (ASA) 81 MG EC tablet, Take 81 mg by mouth, Disp: , Rfl:      atorvastatin (LIPITOR) 40 MG tablet, Take 1 tablet (40 mg) by mouth daily, Disp: 90 tablet, Rfl: 3     buPROPion (WELLBUTRIN XL) 300 MG 24 hr tablet, Take 1 tablet (300 mg) by mouth every morning, Disp: 90 tablet, Rfl: 1     calcium citrate-vitamin D (CITRACAL) 315-250 MG-UNIT TABS per tablet, Take by mouth 2 times daily,  Disp: , Rfl:      cholecalciferol 50 MCG (2000 UT) tablet, Take 2,000 Units by mouth, Disp: , Rfl:      DULoxetine (CYMBALTA) 60 MG capsule, Take 120 mg by mouth, Disp: , Rfl:      EPINEPHrine (ANY BX GENERIC EQUIV) 0.3 MG/0.3ML injection 2-pack, Inject the contents of one device into the muscle as needed for anaphylaxis; may repeat one time in 5-15 minutes if response to initial dose is inadequate if needed for tongue swelling, SOB. Go to ER immediately after use for possible rebound, Disp: 2 each, Rfl: 1     lamoTRIgine (LAMICTAL) 200 MG tablet, Take 200 mg by mouth, Disp: , Rfl:      levonorgestrel (MIRENA) 20 MCG/24HR IUD, 1 each by Intrauterine route, Disp: , Rfl:      nitroGLYcerin (NITROSTAT) 0.4 MG sublingual tablet, Place 0.4 mg under the tongue, Disp: , Rfl:      Omega-3 Fatty Acids (FISH OIL PEARLS) 183.33 MG CAPS, Take 1 capsule by mouth, Disp: , Rfl:      omeprazole (PRILOSEC) 40 MG DR capsule, Take 1 capsule (40 mg total) by mouth 2 (two) times a day., Disp: 180 capsule, Rfl: 3     Respiratory Therapy Supplies (CARETOUCH 2 CPAP HOSE ) MISC, CPAP for home use at pressure of 10cmw.  Heated humidifier, Heated humidifier x1 q5 yr , Humidifier chamber x1 q6 mo, Full face mask x1 q3 mo, Full face cushion q2 mo, Heated tubing x1 q3 mo, Headgear x1 q6 mo, Chinstrap x1 q6 mo, Filters: Disposable x2 q mo, Non-disposable filters x1 q6 mo, Length of Need: 99 months, Frequency of use: Daily., Disp: , Rfl:      Respiratory Therapy Supplies (CARETOUCH UNIVERSL CPAP FILTER) MISC, CPAP for home use at pressure of 10cmw.  Heated humidifier, Heated humidifier x1 q5 yr , Humidifier chamber x1 q6 mo, Full face mask x1 q3 mo, Full face cushion q2 mo, Heated tubing x1 q3 mo, Headgear x1 q6 mo, Chinstrap x1 q6 mo, Filters: Disposable x2 q mo, Non-disposable filters x1 q6 mo, Length of Need: 99 months, Frequency of use: Daily., Disp: , Rfl:      rosuvastatin (CRESTOR) 40 MG tablet, , Disp: , Rfl:      Semaglutide-Weight  "Management (WEGOVY) 2.4 MG/0.75ML pen, Inject 2.4 mg subcutaneously once a week., Disp: 9 mL, Rfl: 3     Sharps Container (SHARPS-A-GATOR LOCKING BRACKET) MISC, CPAP for home use at pressure of 9  Heated humidifier x1, Humidifier chamber x1, Heated tubing x1, Full face mask with cushion x1, Headgear x1, Filters: Disposable x1 pack, Reusable x1pk, Length of Need: 99 months, Frequency of use: Daily, Disp: , Rfl:      traZODone (DESYREL) 50 MG tablet, Take 50 mg by mouth, Disp: , Rfl:      vitamin B complex with vitamin C (VITAMIN  B COMPLEX) tablet, Take 1 tablet by mouth, Disp: , Rfl:      cyclobenzaprine (FLEXERIL) 5 MG tablet, Take 1-2 tablets (5-10 mg) by mouth 3 times daily as needed for muscle spasms (Patient not taking: Reported on 7/28/2023), Disp: 20 tablet, Rfl: 3     Plan: Restart B-12, discussed water soluble nature of B-12 and no upper limit and consequences of a low B-12, and 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 Continue MVI 2/d and D3 5,000U (125mcg) Semaglutide 2.4mg weekly injections.     RD prn then annual with labs    Bariatric Surgery Review     Interim History/LifeChanges: Maintaining a 79# weight loss. Started doing yoga 2-3X/wk. Sleeping poorly. Walking. 20 minutes 3X/wk. [place]. Now nausea. Constipation managed by miralax. Applejuice with miralax. Occasional soda regular. Was told to stop her B-12 due to a high level. Snarky tea (low/no calories).     Patient Concerns: doing well.  Appetite (1-10): managed well  GERD: on PPI    Medication changes: no    Vitamin Intake:   B-12   stopped   MVI  2 with iron   Vitamin D  5,000   Calcium   supplement     Other                LABS: \"Reviewed  excellent  Nausea rare  Vomiting no  Constipation managed  Diarrhea no  Rashes no  Hair Loss no  Calf tenderness no  Breathing difficulty no  Reactive Hypoglycemia avoids  Light Headedness occ   Moods up and down. Now on an upswing    12 point ROS as above and otherwise " negative      Habits:  Alcohol: no  Tobacco: no  Caffeine tea and coffee  NSAIDS ASA PPI  Exercise Routine: walking and yoga  3 meals/day yes B: 2 cups of coffee and protein shake L: 1/4 c rice and stir pierce veggies with chicken D: grapes and cheese and nuts  Protein first yes  60grams/day  Water Separate from meals yes  Calorie Containing Beverages occ to rare  Restaurant eating/wk 0-1  Sleeping not well-interrupted ruminating thoughts, couldn't fall asleep last night-has white noise, writing it down, trazodone. 5-10 hours  Stress very high due to stress, anxiety,-working intentionally to improve/manage  CPAP: NA  Contraception: PM  DEXA:NA    Social History     Social History     Socioeconomic History     Marital status:      Spouse name: Not on file     Number of children: Not on file     Years of education: Not on file     Highest education level: Not on file   Occupational History     Not on file   Tobacco Use     Smoking status: Never     Smokeless tobacco: Never   Vaping Use     Vaping status: Never Used   Substance and Sexual Activity     Alcohol use: No     Comment: a bottle of wine a day      Drug use: No     Sexual activity: Yes     Partners: Male     Birth control/protection: I.U.D., Female Surgical   Other Topics Concern     Parent/sibling w/ CABG, MI or angioplasty before 65F 55M? 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 [place]      Lives at home with her      Social Drivers of Health     Financial Resource Strain: High Risk (12/27/2021)    [facility]    Financial Resource Strain      Difficulty of Paying Living Expenses: Not on file      Difficulty of Paying Living Expenses: Not on file   Food Insecurity: No Food Insecurity (3/17/2021)    Hunger Vital Sign      Worried About Running Out of Food in the Last Year: Never true      Ran Out of Food in the  Last Year: Never true   Transportation Needs: No Transportation Needs (3/17/2021)    PRAPARE - Transportation      Lack of Transportation (Medical): No      Lack of Transportation (Non-Medical): No   Physical Activity: Not on file   Stress: Not on file   Social Connections: Unknown (1/16/2023)    [facility] [employer]  [facility]    Social Connections      Frequency of Communication with Friends and Family: Not on file   Interpersonal Safety: Not on file   Housing Stability: Not on file       Past Medical History     Past Medical History:   Diagnosis Date     Anxiety      Bipolar disorder (H)      Bipolar disorder (H)      Cardiomyopathy (H)      Carpal tunnel syndrome      Coronary artery disease      Coronary artery disease 04/01/2013    2 stents     Coronary atherosclerosis      Depression      Depressive disorder      Heart attack (H) 2013     Heart attack (H)      Heart disease      Heartburn      History of anesthesia complications      Hyperlipidemia      Hypertension      Hypertension      Migraine      Morbid obesity with BMI of 40.0-44.9, adult (H)      Myocardial infarction (H) 20013     Obsessive compulsive disorder      OCD (obsessive compulsive disorder)      NAVA on CPAP      Plantar fasciitis      PONV (postoperative nausea and vomiting)      Sleep apnea     cpap     Stress incontinence      Problem List     Patient Active Problem List   Diagnosis     Bipolar disorder (H)     Coronary atherosclerosis of native coronary artery     Esophageal reflux     Essential hypertension     Female stress incontinence     Gastrointestinal problem     Gastrojejunal ulcer with perforation (H)     Heartburn     Internal hernia     Knee pain     Marginal ulcer     Migraine     Mixed hyperlipidemia     Moderate episode of recurrent major depressive disorder (H)     Morbid obesity (H)     Obsessive-compulsive disorder     Plantar fasciitis      Pneumoperitoneum     NAVA (obstructive sleep apnea)     Small bowel obstruction (H)     Stented coronary artery     DENICE (generalized anxiety disorder)     History of MI (myocardial infarction)     Upper back pain     Pulmonary nodules     Medications       Current Outpatient Medications:      acetaminophen (TYLENOL) 500 MG tablet, Take 1,000 mg by mouth, Disp: , Rfl:      ALPRAZolam (XANAX) 0.25 MG tablet, , Disp: , Rfl:      ARIPiprazole (ABILIFY) 10 MG tablet, Take 1 tablet (10 mg) by mouth daily, Disp: 90 tablet, Rfl: 0     ARIPiprazole (ABILIFY) 5 MG tablet, , Disp: , Rfl:      aspirin (ASA) 81 MG EC tablet, Take 81 mg by mouth, Disp: , Rfl:      atorvastatin (LIPITOR) 40 MG tablet, Take 1 tablet (40 mg) by mouth daily, Disp: 90 tablet, Rfl: 3     buPROPion (WELLBUTRIN XL) 300 MG 24 hr tablet, Take 1 tablet (300 mg) by mouth every morning, Disp: 90 tablet, Rfl: 1     calcium citrate-vitamin D (CITRACAL) 315-250 MG-UNIT TABS per tablet, Take by mouth 2 times daily, Disp: , Rfl:      cholecalciferol 50 MCG (2000 UT) tablet, Take 2,000 Units by mouth, Disp: , Rfl:      DULoxetine (CYMBALTA) 60 MG capsule, Take 120 mg by mouth, Disp: , Rfl:      EPINEPHrine (ANY BX GENERIC EQUIV) 0.3 MG/0.3ML injection 2-pack, Inject the contents of one device into the muscle as needed for anaphylaxis; may repeat one time in 5-15 minutes if response to initial dose is inadequate if needed for tongue swelling, SOB. Go to ER immediately after use for possible rebound, Disp: 2 each, Rfl: 1     lamoTRIgine (LAMICTAL) 200 MG tablet, Take 200 mg by mouth, Disp: , Rfl:      levonorgestrel (MIRENA) 20 MCG/24HR IUD, 1 each by Intrauterine route, Disp: , Rfl:      nitroGLYcerin (NITROSTAT) 0.4 MG sublingual tablet, Place 0.4 mg under the tongue, Disp: , Rfl:      Omega-3 Fatty Acids (FISH OIL PEARLS) 183.33 MG CAPS, Take 1 capsule by mouth, Disp: , Rfl:      omeprazole (PRILOSEC) 40 MG DR capsule, Take 1 capsule (40 mg total) by mouth 2 (two)  times a day., Disp: 180 capsule, Rfl: 3     Respiratory Therapy Supplies (CARETOUCH 2 CPAP HOSE ) MISC, CPAP for home use at pressure of 10cmw.  Heated humidifier, Heated humidifier x1 q5 yr , Humidifier chamber x1 q6 mo, Full face mask x1 q3 mo, Full face cushion q2 mo, Heated tubing x1 q3 mo, Headgear x1 q6 mo, Chinstrap x1 q6 mo, Filters: Disposable x2 q mo, Non-disposable filters x1 q6 mo, Length of Need: 99 months, Frequency of use: Daily., Disp: , Rfl:      Respiratory Therapy Supplies (CARETOUCH UNIVERSL CPAP FILTER) MISC, CPAP for home use at pressure of 10cmw.  Heated humidifier, Heated humidifier x1 q5 yr , Humidifier chamber x1 q6 mo, Full face mask x1 q3 mo, Full face cushion q2 mo, Heated tubing x1 q3 mo, Headgear x1 q6 mo, Chinstrap x1 q6 mo, Filters: Disposable x2 q mo, Non-disposable filters x1 q6 mo, Length of Need: 99 months, Frequency of use: Daily., Disp: , Rfl:      rosuvastatin (CRESTOR) 40 MG tablet, , Disp: , Rfl:      Semaglutide-Weight Management (WEGOVY) 2.4 MG/0.75ML pen, Inject 2.4 mg subcutaneously once a week., Disp: 9 mL, Rfl: 3     Sharps Container (SHARPS-A-GATOR LOCKING BRACKET) MISC, CPAP for home use at pressure of 9  Heated humidifier x1, Humidifier chamber x1, Heated tubing x1, Full face mask with cushion x1, Headgear x1, Filters: Disposable x1 pack, Reusable x1pk, Length of Need: 99 months, Frequency of use: Daily, Disp: , Rfl:      traZODone (DESYREL) 50 MG tablet, Take 50 mg by mouth, Disp: , Rfl:      vitamin B complex with vitamin C (VITAMIN  B COMPLEX) tablet, Take 1 tablet by mouth, Disp: , Rfl:      cyclobenzaprine (FLEXERIL) 5 MG tablet, Take 1-2 tablets (5-10 mg) by mouth 3 times daily as needed for muscle spasms (Patient not taking: Reported on 7/28/2023), Disp: 20 tablet, Rfl: 3   Surgical History     Past Surgical History  She has a past surgical history that includes Teri En Y bowel (01/23/2019); gastric bypass; hernia repair; Stomach surgery; Breast  "surgery; tubal ligation; Toe Surgery; Cholecystectomy; Stent; Lyons Tooth Extraction; other surgical history; tubal ligation (2004); Cholecystectomy (2010); Mammoplasty reduction (1998); Biopsy breast (2012); Angioplasty (2013); Cystocele Repair; Cardiac surgery; Hysteroscopy Diagnostic; Pr Lap Gastric Bypass/Teri-En-Y (N/A, 1/29/2019); Pr Lap,Diagnostic Abdomen (N/A, 5/21/2019); Pr Lap,Diagnostic Abdomen (N/A, 5/7/2020); Gastrectomy; vascular surgery; and Abdomen surgery.    Objective-Exam     Constitutional:  Ht 1.778 m (5' 10\")   Wt 104.3 kg (230 lb)   BMI 33.00 kg/m      General:  Pleasant and in no acute distress   Eyes:  engaging  Neck:  Supple, No LAD, No thyromegaly, No carotid bruits appreciated  Respiratory: Normal respiratory effort, no cough, wheezes or crackles  Skin: color pink Hair pulled back  Neurological: No tremor,  Psychiatric: alert and oriented X3, mood and affect normal    Counseling     We reviewed the important post op bariatric recommendations:  -eating 3 meals daily  -eating protein first, getting >60gm protein daily  -eating slowly, chewing food well  -avoiding/limiting calorie containing beverages  -drinking water 15-30 minutes before or after meals  -choosing wheat, not white with breads, crackers, pastas, noemy, bagels, tortillas, rice  -limiting restaurant or cafeteria eating to twice a week or less    We discussed the importance of restorative sleep and stress management in maintaining a healthy weight.  We discussed the National Weight Control Registry healthy weight maintenance strategies and ways to optimize metabolism.  We discussed the importance of physical activity including cardiovascular and strength training in maintaining a healthier weight.    We discussed the importance of life-long vitamin supplementation and life-long  follow-up.    Kristine was reminded that, to avoid marginal ulcers she should avoid tobacco at all, alcohol in excess, caffeine in excess, and NSAIDS " (unless indicated for cardioprotection or othewise and opposed by a PPI).    Sherly Zamora MD, MD, [facility].  11/1/2024  8:41 AM      No images are attached to the encounter.  Total time spent on the date of this encounter doing: chart review, review of test results, patient visit, physical exam, education, counseling, developing plan of care, and documenting = 24 minutes.      Again, thank you for allowing me to participate in the care of your patient.        Sincerely,        Sherly Zamora MD

## 2024-12-16 ENCOUNTER — TELEPHONE (OUTPATIENT)
Dept: SURGERY | Facility: CLINIC | Age: 54
End: 2024-12-16
Payer: COMMERCIAL

## 2024-12-16 NOTE — TELEPHONE ENCOUNTER
Central Prior Authorization Team - Phone: [phone]     PA Initiation    Medication: WEGOVY 2.4 MG/0.75ML SC SOAJ  Insurance Company: [employer] - Phone [phone] Fax [phone]  Pharmacy Filling the Rx: [facility] [place], [place] - [address] [place] [address]  Filling Pharmacy Phone: [phone]  Filling Pharmacy Fax:    Start Date: 12/16/2024

## 2024-12-16 NOTE — TELEPHONE ENCOUNTER
Prior Authorization Retail Medication Request    Medication/Dose: Wegovy 2.4 mg weekly injections  New/renewal/insurance change PA/secondary ins. PA: New - last time it did say that a PA wasn't needed back in August    Insurance   Key: [id number]    Pharmacy Information (if different than what is on RX)  Name:  [facility]    Clinic Information  Preferred routing pool for dept communication: Bariatric Surgery Support Pool [place]

## 2024-12-17 NOTE — TELEPHONE ENCOUNTER
Central Prior Authorization Team - Phone: [phone]     Prior Authorization Approval    Medication: WEGOVY 2.4 MG/0.75ML SC SOAJ  Authorization Effective Date: 12/16/2024  Authorization Expiration Date: 12/16/2025  Approved Dose/Quantity: 3  Reference #:     Insurance Company: [employer] - Phone [phone] Fax [phone]  Expected CoPay: $    CoPay Card Available:      Financial Assistance Needed:   Which Pharmacy is filling the prescription: [facility]. [place], [place] - [address] [place] [address]  Pharmacy Notified: YES  Patient Notified: YES Instructed pharmacy to notify patient once order is ready.

## 2025-04-13 ENCOUNTER — HEALTH MAINTENANCE LETTER (OUTPATIENT)
Age: 55
End: 2025-04-13

## 2025-05-04 ENCOUNTER — HEALTH MAINTENANCE LETTER (OUTPATIENT)
Age: 55
End: 2025-05-04

## 2025-07-15 DIAGNOSIS — K21.9 GASTROESOPHAGEAL REFLUX DISEASE WITHOUT ESOPHAGITIS: ICD-10-CM

## 2025-07-15 RX ORDER — OMEPRAZOLE 40 MG/1
CAPSULE, DELAYED RELEASE ORAL
Qty: 180 CAPSULE | Refills: 3 | Status: SHIPPED | OUTPATIENT
Start: 2025-07-15 | End: 2026-07-15

## 2025-07-31 DIAGNOSIS — E66.01 MORBID OBESITY (H): ICD-10-CM

## 2025-07-31 RX ORDER — SEMAGLUTIDE 2.4 MG/.75ML
INJECTION, SOLUTION SUBCUTANEOUS
Qty: 15 ML | Refills: 0 | Status: SHIPPED | OUTPATIENT
Start: 2025-07-31